# Patient Record
Sex: FEMALE | Race: WHITE | Employment: PART TIME | URBAN - METROPOLITAN AREA
[De-identification: names, ages, dates, MRNs, and addresses within clinical notes are randomized per-mention and may not be internally consistent; named-entity substitution may affect disease eponyms.]

---

## 2017-03-03 ENCOUNTER — HOSPITAL ENCOUNTER (EMERGENCY)
Age: 37
Discharge: HOME | End: 2017-03-03
Attending: FAMILY MEDICINE

## 2017-03-03 VITALS
HEIGHT: 66 IN | OXYGEN SATURATION: 98 % | SYSTOLIC BLOOD PRESSURE: 189 MMHG | DIASTOLIC BLOOD PRESSURE: 107 MMHG | RESPIRATION RATE: 18 BRPM | WEIGHT: 224 LBS | BODY MASS INDEX: 36 KG/M2 | TEMPERATURE: 97.7 F | HEART RATE: 55 BPM

## 2017-03-03 DIAGNOSIS — R05.9 COUGH: Primary | ICD-10-CM

## 2017-03-03 DIAGNOSIS — D17.20 LIPOMA OF AXILLA: ICD-10-CM

## 2017-03-03 RX ORDER — BENZONATATE 100 MG/1
100 CAPSULE ORAL
Qty: 30 CAP | Refills: 0 | Status: SHIPPED | OUTPATIENT
Start: 2017-03-03 | End: 2017-03-10

## 2017-03-03 RX ORDER — LEVOFLOXACIN 500 MG/1
500 TABLET, FILM COATED ORAL DAILY
Qty: 7 TAB | Refills: 0 | Status: SHIPPED | OUTPATIENT
Start: 2017-03-03 | End: 2017-03-29

## 2017-03-03 NOTE — UC PROVIDER NOTE
Patient is a 39 y.o. female presenting with cough and nodule. The history is provided by the patient. Cough   This is a new problem. The current episode started more than 2 days ago. The problem has been gradually worsening. The cough is non-productive. There has been no fever. Associated symptoms include sore throat. Pertinent negatives include no chest pain, no chills, no sweats, no ear congestion, no rhinorrhea, no myalgias, no wheezing and no nausea. She has tried nothing for the symptoms. She is a smoker. Her past medical history does not include bronchitis, pneumonia, asthma or heart failure. Nodule    This is a new (1 month) problem. The problem has not changed since onset. The problem is associated with nothing. There has been no fever. The rash is present on the right arm. The pain is mild. The pain has been fluctuating since onset. Pertinent negatives include no blisters, no itching, no pain and no weeping. She has tried nothing for the symptoms. Past Medical History:   Diagnosis Date    Allergy     Anxiety     Asthma     Congenital heart disease         Past Surgical History:   Procedure Laterality Date    HX DILATION AND CURETTAGE      endometriosis and precervical ca    HX HEENT      tonsillectomy,DNS sx    HX ORTHOPAEDIC      right tibia fx,no sx needed         Family History   Problem Relation Age of Onset    Hypertension Mother         Social History     Social History    Marital status:      Spouse name: N/A    Number of children: N/A    Years of education: N/A     Occupational History    Not on file.      Social History Main Topics    Smoking status: Light Tobacco Smoker     Packs/day: 0.25     Years: 2.00     Types: Cigarettes    Smokeless tobacco: Never Used    Alcohol use 0.0 oz/week     0 Standard drinks or equivalent per week      Comment: socially    Drug use: No    Sexual activity: Not Currently     Birth control/ protection: None      Comment: ,works in [employer]     Other Topics Concern    Not on file     Social History Narrative                ALLERGIES: Codeine; Hydrocodone; Pcn [penicillins]; and Zithromax [azithromycin]    Review of Systems   Constitutional: Negative for chills. HENT: Positive for sore throat. Negative for rhinorrhea. Respiratory: Positive for cough. Negative for wheezing. Cardiovascular: Negative for chest pain. Gastrointestinal: Negative for nausea. Musculoskeletal: Negative for myalgias. Skin: Negative for itching. Vitals:    03/03/17 1328   BP: (!) 189/107   Pulse: (!) 55   Resp: 18   Temp: 97.7 °F (36.5 °C)   SpO2: 98%   Weight: 101.6 kg (224 lb)   Height: 5' 5.5\" (1.664 m)       Physical Exam   Constitutional: She is oriented to person, place, and time. She appears well-developed and well-nourished. HENT:   Right Ear: External ear normal.   Left Ear: External ear normal.   Eyes: EOM are normal.   Neck: Normal range of motion. Neck supple. No JVD present. No tracheal deviation present. No thyromegaly present. Cardiovascular: Normal rate, regular rhythm and normal heart sounds. Pulmonary/Chest: Effort normal and breath sounds normal. No respiratory distress. She has no wheezes. She has no rales. She exhibits no tenderness. Lymphadenopathy:     She has no cervical adenopathy. Neurological: She is alert and oriented to person, place, and time. Skin: Skin is warm and dry. Pea-sized nodule in right axilla   Psychiatric: She has a normal mood and affect. Her behavior is normal. Judgment and thought content normal.   Nursing note and vitals reviewed. MDM     Differential Diagnosis; Clinical Impression; Plan:     CLINICAL IMPRESSION:  Cough  (primary encounter diagnosis)  Lipoma of axilla    Plan:  1. Tessalon  2. Levaquin  3.    Amount and/or Complexity of Data Reviewed:   Tests in the radiology section of CPT®:  Ordered and reviewed  Risk of Significant Complications, Morbidity, and/or Mortality: Presenting problems: Moderate  Diagnostic procedures: Moderate  Management options:   Moderate  Progress:   Patient progress:  Stable      Procedures

## 2017-03-03 NOTE — DISCHARGE INSTRUCTIONS
Cough: Care Instructions  Your Care Instructions  A cough is your body's response to something that bothers your throat or airways. Many things can cause a cough. You might cough because of a cold or the flu, bronchitis, or asthma. Smoking, postnasal drip, allergies, and stomach acid that backs up into your throat also can cause coughs. A cough is a symptom, not a disease. Most coughs stop when the cause, such as a cold, goes away. You can take a few steps at home to cough less and feel better. Follow-up care is a key part of your treatment and safety. Be sure to make and go to all appointments, and call your doctor if you are having problems. It's also a good idea to know your test results and keep a list of the medicines you take. How can you care for yourself at home? · Drink lots of water and other fluids. This helps thin the mucus and soothes a dry or sore throat. Honey or lemon juice in hot water or tea may ease a dry cough. · Take cough medicine as directed by your doctor. · Prop up your head on pillows to help you breathe and ease a dry cough. · Try cough drops to soothe a dry or sore throat. Cough drops don't stop a cough. Medicine-flavored cough drops are no better than candy-flavored drops or hard candy. · Do not smoke. Avoid secondhand smoke. If you need help quitting, talk to your doctor about stop-smoking programs and medicines. These can increase your chances of quitting for good. When should you call for help? Call 911 anytime you think you may need emergency care. For example, call if:  · You have severe trouble breathing. Call your doctor now or seek immediate medical care if:  · You cough up blood. · You have new or worse trouble breathing. · You have a new or higher fever. · You have a new rash.   Watch closely for changes in your health, and be sure to contact your doctor if:  · You cough more deeply or more often, especially if you notice more mucus or a change in the color of your mucus. · You have new symptoms, such as a sore throat, an earache, or sinus pain. · You do not get better as expected. Where can you learn more? Go to http://jaimie-magdy.info/. Enter D279 in the search box to learn more about \"Cough: Care Instructions. \"  Current as of: May 27, 2016  Content Version: 11.1  © 2006-2016 [employer]. Care instructions adapted under license by [employer] (which disclaims liability or warranty for this information). If you have questions about a medical condition or this instruction, always ask your healthcare professional. [place] [address] any warranty or liability for your use of this information. Lipoma: Care Instructions  Your Care Instructions  A lipoma is a growth of fat just below the skin. It may feel soft and rubbery. Lipomas can occur anywhere on the body. But they are most common on the torso, neck, upper thighs, upper arms, and armpits. A lipoma does not turn into cancer. Lipomas usually are not treated, because most of them don't hurt or cause problems. But your doctor may remove a lipoma if it is painful, gets infected, or bothers you. Follow-up care is a key part of your treatment and safety. Be sure to make and go to all appointments, and call your doctor if you are having problems. It's also a good idea to know your test results and keep a list of the medicines you take. How can you care for yourself at home? · Lipomas usually need no care at home. · If your doctor removes a lipoma, follow directions for taking care of the cut (incision). When should you call for help? Call your doctor now or seek immediate medical care if:  · You have signs of infection, such as:  ¨ Increased pain, swelling, warmth, or redness. ¨ Red streaks leading from the lipoma. ¨ Pus draining from the lipoma. ¨ A fever.   Watch closely for changes in your health, and be sure to contact your doctor if:  · You want to discuss having a lipoma removed. Where can you learn more? Go to http://jaimie-magdy.info/. Enter T352 in the search box to learn more about \"Lipoma: Care Instructions. \"  Current as of: July 1, 2016  Content Version: 11.1  © 4628-9557 [employer]. Care instructions adapted under license by [employer] (which disclaims liability or warranty for this information). If you have questions about a medical condition or this instruction, always ask your healthcare professional. [address] any warranty or liability for your use of this information.

## 2017-03-29 ENCOUNTER — HOSPITAL ENCOUNTER (EMERGENCY)
Age: 37
Discharge: HOME | End: 2017-03-29
Attending: FAMILY MEDICINE

## 2017-03-29 ENCOUNTER — APPOINTMENT (OUTPATIENT)
Dept: GENERAL RADIOLOGY | Age: 37
End: 2017-03-29
Attending: NURSE PRACTITIONER

## 2017-03-29 VITALS
TEMPERATURE: 98.6 F | WEIGHT: 226 LBS | HEART RATE: 60 BPM | OXYGEN SATURATION: 97 % | SYSTOLIC BLOOD PRESSURE: 182 MMHG | RESPIRATION RATE: 18 BRPM | HEIGHT: 66 IN | DIASTOLIC BLOOD PRESSURE: 101 MMHG | BODY MASS INDEX: 36.32 KG/M2

## 2017-03-29 DIAGNOSIS — I10 ESSENTIAL HYPERTENSION: ICD-10-CM

## 2017-03-29 DIAGNOSIS — R31.9 HEMATURIA: ICD-10-CM

## 2017-03-29 DIAGNOSIS — M54.9 MID BACK PAIN ON RIGHT SIDE: Primary | ICD-10-CM

## 2017-03-29 LAB
BILIRUB UR QL: ABNORMAL
GLUCOSE UR QL STRIP.AUTO: NEGATIVE MG/DL
KETONES UR-MCNC: NEGATIVE MG/DL
LEUKOCYTE ESTERASE UR QL STRIP: NEGATIVE
NITRITE UR QL: NEGATIVE
PH UR: 7 [PH] (ref 5–8)
PROT UR QL: 30 MG/DL
RBC # UR STRIP: ABNORMAL /UL
SP GR UR: 1.02 (ref 1–1.03)
UROBILINOGEN UR QL: 0.2 EU/DL (ref 0.2–1)

## 2017-03-29 RX ORDER — CLONIDINE HYDROCHLORIDE 0.1 MG/1
0.1 TABLET ORAL
Status: COMPLETED | OUTPATIENT
Start: 2017-03-29 | End: 2017-03-29

## 2017-03-29 RX ORDER — DICLOFENAC POTASSIUM 50 MG/1
50 TABLET, FILM COATED ORAL 3 TIMES DAILY
Qty: 50 TAB | Refills: 0 | Status: SHIPPED | OUTPATIENT
Start: 2017-03-29 | End: 2017-07-26 | Stop reason: ALTCHOICE

## 2017-03-29 RX ORDER — KETOROLAC TROMETHAMINE 30 MG/ML
60 INJECTION, SOLUTION INTRAMUSCULAR; INTRAVENOUS
Status: COMPLETED | OUTPATIENT
Start: 2017-03-29 | End: 2017-03-29

## 2017-03-29 RX ORDER — METHOCARBAMOL 500 MG/1
500 TABLET, FILM COATED ORAL 3 TIMES DAILY
Qty: 20 TAB | Refills: 0 | Status: SHIPPED | OUTPATIENT
Start: 2017-03-29 | End: 2017-07-26 | Stop reason: ALTCHOICE

## 2017-03-29 RX ADMIN — KETOROLAC TROMETHAMINE 60 MG: 30 INJECTION, SOLUTION INTRAMUSCULAR; INTRAVENOUS at 12:41

## 2017-03-29 RX ADMIN — CLONIDINE HYDROCHLORIDE 0.1 MG: 0.1 TABLET ORAL at 13:09

## 2017-03-29 NOTE — DISCHARGE INSTRUCTIONS
High Blood Pressure: Care Instructions  Your Care Instructions  If your blood pressure is usually above 140/90, you have high blood pressure, or hypertension. That means the top number is 140 or higher or the bottom number is 90 or higher, or both. Despite what a lot of people think, high blood pressure usually doesn't cause headaches or make you feel dizzy or lightheaded. It usually has no symptoms. But it does increase your risk for heart attack, stroke, and kidney or eye damage. The higher your blood pressure, the more your risk increases. Your doctor will give you a goal for your blood pressure. Your goal will be based on your health and your age. An example of a goal is to keep your blood pressure below 140/90. Lifestyle changes, such as eating healthy and being active, are always important to help lower blood pressure. You might also take medicine to reach your blood pressure goal.  Follow-up care is a key part of your treatment and safety. Be sure to make and go to all appointments, and call your doctor if you are having problems. It's also a good idea to know your test results and keep a list of the medicines you take. How can you care for yourself at home? Medical treatment  · If you stop taking your medicine, your blood pressure will go back up. You may take one or more types of medicine to lower your blood pressure. Be safe with medicines. Take your medicine exactly as prescribed. Call your doctor if you think you are having a problem with your medicine. · Talk to your doctor before you start taking aspirin every day. Aspirin can help certain people lower their risk of a heart attack or stroke. But taking aspirin isn't right for everyone, because it can cause serious bleeding. · See your doctor regularly. You may need to see the doctor more often at first or until your blood pressure comes down.   · If you are taking blood pressure medicine, talk to your doctor before you take decongestants or anti-inflammatory medicine, such as ibuprofen. Some of these medicines can raise blood pressure. · Learn how to check your blood pressure at home. Lifestyle changes  · Stay at a healthy weight. This is especially important if you put on weight around the waist. Losing even 10 pounds can help you lower your blood pressure. · If your doctor recommends it, get more exercise. Walking is a good choice. Bit by bit, increase the amount you walk every day. Try for at least 30 minutes on most days of the week. You also may want to swim, bike, or do other activities. · Avoid or limit alcohol. Talk to your doctor about whether you can drink any alcohol. · Try to limit how much sodium you eat to less than 2,300 milligrams (mg) a day. Your doctor may ask you to try to eat less than 1,500 mg a day. · Eat plenty of fruits (such as bananas and oranges), vegetables, legumes, whole grains, and low-fat dairy products. · Lower the amount of saturated fat in your diet. Saturated fat is found in animal products such as milk, cheese, and meat. Limiting these foods may help you lose weight and also lower your risk for heart disease. · Do not smoke. Smoking increases your risk for heart attack and stroke. If you need help quitting, talk to your doctor about stop-smoking programs and medicines. These can increase your chances of quitting for good. When should you call for help? Call 911 anytime you think you may need emergency care. This may mean having symptoms that suggest that your blood pressure is causing a serious heart or blood vessel problem. Your blood pressure may be over 180/110. For example, call 911 if:  · You have symptoms of a heart attack. These may include:  ¨ Chest pain or pressure, or a strange feeling in the chest.  ¨ Sweating. ¨ Shortness of breath. ¨ Nausea or vomiting. ¨ Pain, pressure, or a strange feeling in the back, neck, jaw, or upper belly or in one or both shoulders or arms.   ¨ Lightheadedness or sudden weakness. ¨ A fast or irregular heartbeat. · You have symptoms of a stroke. These may include:  ¨ Sudden numbness, tingling, weakness, or loss of movement in your face, arm, or leg, especially on only one side of your body. ¨ Sudden vision changes. ¨ Sudden trouble speaking. ¨ Sudden confusion or trouble understanding simple statements. ¨ Sudden problems with walking or balance. ¨ A sudden, severe headache that is different from past headaches. · You have severe back or belly pain. Do not wait until your blood pressure comes down on its own. Get help right away. Call your doctor now or seek immediate care if:  · Your blood pressure is much higher than normal (such as 180/110 or higher), but you don't have symptoms. · You think high blood pressure is causing symptoms, such as:  ¨ Severe headache. ¨ Blurry vision. Watch closely for changes in your health, and be sure to contact your doctor if:  · Your blood pressure measures 140/90 or higher at least 2 times. That means the top number is 140 or higher or the bottom number is 90 or higher, or both. · You think you may be having side effects from your blood pressure medicine. · Your blood pressure is usually normal, but it goes above normal at least 2 times. Where can you learn more? Go to http://jaimie-magdy.info/. Enter B564 in the search box to learn more about \"High Blood Pressure: Care Instructions. \"  Current as of: August 8, 2016  Content Version: 11.2  © 4414-4666 [employer]. Care instructions adapted under license by [email or web address] (which disclaims liability or warranty for this information). If you have questions about a medical condition or this instruction, always ask your healthcare professional. [place] [address] any warranty or liability for your use of this information.          Flank Pain: Care Instructions  Your Care Instructions  Flank pain is pain on the side of the back just below the rib cage and above the waist. It can be on one or both sides. Flank pain has many possible causes, including a kidney stone, a urinary tract infection, or back strain. Flank pain may get better on its own. But don't ignore new symptoms, such as fever, nausea and vomiting, urination problems, pain that gets worse, and dizziness. These may be signs of a more serious problem. You may have to have tests or other treatment. Follow-up care is a key part of your treatment and safety. Be sure to make and go to all appointments, and call your doctor if you are having problems. It's also a good idea to know your test results and keep a list of the medicines you take. How can you care for yourself at home? · Rest until you feel better. · Take pain medicines exactly as directed. ¨ If the doctor gave you a prescription medicine for pain, take it as prescribed. ¨ If you are not taking a prescription pain medicine, ask your doctor if you can take an over-the-counter pain medicine, such as acetaminophen (Tylenol), ibuprofen (Advil, Motrin), or naproxen (Aleve). Read and follow all instructions on the label. · If your doctor prescribed antibiotics, take them as directed. Do not stop taking them just because you feel better. You need to take the full course of antibiotics. · To apply heat, put a warm water bottle, a heating pad set on low, or a warm cloth on the painful area. Do not go to sleep with a heating pad on your skin. · To prevent dehydration, drink plenty of fluids, enough so that your urine is light yellow or clear like water. Choose water and other caffeine-free clear liquids until you feel better. If you have kidney, heart, or liver disease and have to limit fluids, talk with your doctor before you increase the amount of fluids you drink. When should you call for help? Call your doctor now or seek immediate medical care if:  · Your flank pain gets worse.   · You have new symptoms, such as fever, nausea, or vomiting. · You have symptoms of a urinary problem. For example:  ¨ You have blood or pus in your urine. ¨ You have chills or body aches. ¨ It hurts to urinate. ¨ You have groin or belly pain. Watch closely for changes in your health, and be sure to contact your doctor if you do not get better as expected. Where can you learn more? Go to http://jaimie-magdy.info/. Enter S191 in the search box to learn more about \"Flank Pain: Care Instructions. \"  Current as of: May 27, 2016  Content Version: 11.2  © 1610-8512 [employer]. Care instructions adapted under license by [employer] (which disclaims liability or warranty for this information). If you have questions about a medical condition or this instruction, always ask your healthcare professional. [place] [address] any warranty or liability for your use of this information. Back Pain: Care Instructions  Your Care Instructions    Back pain has many possible causes. It is often related to problems with muscles and ligaments of the back. It may also be related to problems with the nerves, discs, or bones of the back. Moving, lifting, standing, sitting, or sleeping in an awkward way can strain the back. Sometimes you don't notice the injury until later. Arthritis is another common cause of back pain. Although it may hurt a lot, back pain usually improves on its own within several weeks. Most people recover in 12 weeks or less. Using good home treatment and being careful not to stress your back can help you feel better sooner. Follow-up care is a key part of your treatment and safety. Be sure to make and go to all appointments, and call your doctor if you are having problems. Its also a good idea to know your test results and keep a list of the medicines you take. How can you care for yourself at home? · Sit or lie in positions that are most comfortable and reduce your pain.  Try one of these positions when you lie down:  ¨ Lie on your back with your knees bent and supported by large pillows. ¨ Lie on the floor with your legs on the seat of a sofa or chair. [place] on your side with your knees and hips bent and a pillow between your legs. ¨ Lie on your stomach if it does not make pain worse. · Do not sit up in bed, and avoid soft couches and twisted positions. Bed rest can help relieve pain at first, but it delays healing. Avoid bed rest after the first day of back pain. · Change positions every 30 minutes. If you must sit for long periods of time, take breaks from sitting. Get up and walk around, or lie in a comfortable position. · Try using a heating pad on a low or medium setting for 15 to 20 minutes every 2 or 3 hours. Try a warm shower in place of one session with the heating pad. · You can also try an ice pack for 10 to 15 minutes every 2 to 3 hours. Put a thin cloth between the ice pack and your skin. · Take pain medicines exactly as directed. ¨ If the doctor gave you a prescription medicine for pain, take it as prescribed. ¨ If you are not taking a prescription pain medicine, ask your doctor if you can take an over-the-counter medicine. · Take short walks several times a day. You can start with 5 to 10 minutes, 3 or 4 times a day, and work up to longer walks. Walk on level surfaces and avoid hills and stairs until your back is better. · Return to work and other activities as soon as you can. Continued rest without activity is usually not good for your back. · To prevent future back pain, do exercises to stretch and strengthen your back and stomach. Learn how to use good posture, safe lifting techniques, and proper body mechanics. When should you call for help? Call your doctor now or seek immediate medical care if:  · You have new or worsening numbness in your legs. · You have new or worsening weakness in your legs.  (This could make it hard to stand up.)  · You lose control of your bladder or bowels. Watch closely for changes in your health, and be sure to contact your doctor if:  · Your pain gets worse. · You are not getting better after 2 weeks. Where can you learn more? Go to http://jaimie-magdy.info/. Enter W875 in the search box to learn more about \"Back Pain: Care Instructions. \"  Current as of: May 23, 2016  Content Version: 11.2  © 3670-9371 [employer]. Care instructions adapted under license by [employer] (which disclaims liability or warranty for this information). If you have questions about a medical condition or this instruction, always ask your healthcare professional. [address] any warranty or liability for your use of this information.

## 2017-03-29 NOTE — UC PROVIDER NOTE
Patient is a 39 y.o. female presenting with back pain. The history is provided by the patient. No  was used. Back Pain    This is a new problem. Episode onset: 4 days. The problem has been gradually worsening. The problem occurs constantly. Patient reports not work related injury. The pain is associated with no known injury. Pain location: right flank. The pain does not radiate. The pain is at a severity of 7/10. The pain is moderate. The symptoms are aggravated by bending. The pain is the same all the time. Pertinent negatives include no abdominal pain, no abdominal swelling, no bowel incontinence, no perianal numbness, no bladder incontinence and no dysuria. She has tried nothing for the symptoms. The treatment provided no relief. Risk factors include obesity and history of kidney stones. Past Medical History:   Diagnosis Date    Allergy     Anxiety     Asthma     Congenital heart disease         Past Surgical History:   Procedure Laterality Date    HX DILATION AND CURETTAGE      endometriosis and precervical ca    HX HEENT      tonsillectomy,DNS sx    HX ORTHOPAEDIC      right tibia fx,no sx needed         Family History   Problem Relation Age of Onset    Hypertension Mother         Social History     Social History    Marital status:      Spouse name: N/A    Number of children: N/A    Years of education: N/A     Occupational History    Not on file. Social History Main Topics    Smoking status: Light Tobacco Smoker     Packs/day: 0.25     Years: 2.00     Types: Cigarettes    Smokeless tobacco: Never Used    Alcohol use 0.0 oz/week     0 Standard drinks or equivalent per week      Comment: socially    Drug use: No    Sexual activity: Not Currently     Birth control/ protection: None      Comment: ,works in [employer]     Other Topics Concern    Not on file     Social History Narrative                ALLERGIES: Codeine;  Hydrocodone; Pcn [penicillins]; and Zithromax [azithromycin]    Review of Systems   Constitutional: Negative. HENT: Negative. Eyes: Negative. Respiratory: Negative. Cardiovascular: Negative. Gastrointestinal: Negative. Negative for abdominal pain and bowel incontinence. Genitourinary: Positive for flank pain. Negative for bladder incontinence, dysuria, frequency and urgency. Musculoskeletal: Positive for back pain. Skin: Negative. Neurological: Negative. Hematological: Negative. Psychiatric/Behavioral: Negative. Vitals:    03/29/17 1158   BP: (!) 185/102   Pulse: 60   Resp: 18   Temp: 98.6 °F (37 °C)   SpO2: 97%   Weight: 102.5 kg (226 lb)   Height: 5' 5.5\" (1.664 m)       Physical Exam   Constitutional: She is oriented to person, place, and time. She appears well-developed and well-nourished. HENT:   Head: Normocephalic and atraumatic. Right Ear: External ear normal.   Left Ear: External ear normal.   Nose: Nose normal.   Mouth/Throat: Oropharynx is clear and moist.   Eyes: Conjunctivae and EOM are normal. Pupils are equal, round, and reactive to light. Neck: Normal range of motion. Neck supple. Cardiovascular: Normal rate, regular rhythm and normal heart sounds. Pulmonary/Chest: Effort normal and breath sounds normal.   Abdominal: Soft. Bowel sounds are normal. There is tenderness. Mid right abdomen   Musculoskeletal: Normal range of motion. Neurological: She is alert and oriented to person, place, and time. Skin: Skin is warm and dry. Psychiatric: She has a normal mood and affect. Her behavior is normal. Judgment and thought content normal.   Nursing note and vitals reviewed. MDM     Differential Diagnosis; Clinical Impression; Plan:     CLINICAL IMPRESSION:  Mid back pain on right side  (primary encounter diagnosis)  Hematuria  Essential hypertension    Plan:  1. Mid back pain with hematuria. Does not look like a UTI.  If you continue to have mid back pain please go to the ER for further evaluation. 2. Please follow up with your PCP regarding elevated BP. 3.   Amount and/or Complexity of Data Reviewed:   Clinical lab tests:  Ordered and reviewed  Tests in the radiology section of CPT®:  Ordered and reviewed  Risk of Significant Complications, Morbidity, and/or Mortality:   Presenting problems: Moderate  Diagnostic procedures:   Moderate  Progress:   Patient progress:  Stable      Procedures

## 2017-03-31 ENCOUNTER — TELEPHONE (OUTPATIENT)
Dept: INTERNAL MEDICINE CLINIC | Age: 37
End: 2017-03-31

## 2017-03-31 NOTE — TELEPHONE ENCOUNTER
Patient called stated she went to urgent care 2 days ago for pain in her right kidney she still is having pain want to know should she make an appt. to see Hannah Brenner she can be reached at [phone]

## 2017-04-03 ENCOUNTER — OFFICE VISIT (OUTPATIENT)
Dept: INTERNAL MEDICINE CLINIC | Age: 37
End: 2017-04-03

## 2017-04-03 VITALS
HEART RATE: 67 BPM | WEIGHT: 222 LBS | OXYGEN SATURATION: 97 % | DIASTOLIC BLOOD PRESSURE: 98 MMHG | TEMPERATURE: 97.6 F | BODY MASS INDEX: 35.68 KG/M2 | SYSTOLIC BLOOD PRESSURE: 144 MMHG | RESPIRATION RATE: 20 BRPM | HEIGHT: 66 IN

## 2017-04-03 DIAGNOSIS — Z00.00 ROUTINE GENERAL MEDICAL EXAMINATION AT A HEALTH CARE FACILITY: ICD-10-CM

## 2017-04-03 DIAGNOSIS — J45.20 MILD INTERMITTENT ASTHMA WITHOUT COMPLICATION: ICD-10-CM

## 2017-04-03 DIAGNOSIS — I47.1 SVT (SUPRAVENTRICULAR TACHYCARDIA) (HCC): ICD-10-CM

## 2017-04-03 DIAGNOSIS — E55.9 VITAMIN D DEFICIENCY: ICD-10-CM

## 2017-04-03 DIAGNOSIS — M62.830 BACK SPASM: ICD-10-CM

## 2017-04-03 DIAGNOSIS — K52.9 FREQUENT STOOLS: ICD-10-CM

## 2017-04-03 DIAGNOSIS — E66.9 OBESITY (BMI 30-39.9): ICD-10-CM

## 2017-04-03 DIAGNOSIS — M62.838 MUSCLE SPASM: ICD-10-CM

## 2017-04-03 DIAGNOSIS — I10 ESSENTIAL HYPERTENSION: Primary | ICD-10-CM

## 2017-04-03 RX ORDER — ALBUTEROL SULFATE 90 UG/1
1 AEROSOL, METERED RESPIRATORY (INHALATION)
Qty: 1 INHALER | Refills: 3 | Status: SHIPPED | OUTPATIENT
Start: 2017-04-03 | End: 2022-05-16 | Stop reason: SDUPTHER

## 2017-04-03 NOTE — MR AVS SNAPSHOT
Visit Information Date & Time Provider Department Dept. Phone Encounter #  
 4/3/2017  9:30 AM Marcy Ko MD [facility] [id number] Your Appointments 4/18/2017  2:00 PM  
ESTABLISHED PATIENT with Eve Gallagher MD  
[facility] ([address]) Appt Note: 6 month follow up  
 [address]  
[address]  
  
    
 7/26/2017  3:00 PM  
ESTABLISHED PATIENT with Eve Gallagher MD  
[facility] ([address]) Appt Note: one year follow up  
 [address] [facility] [address]  
[phone] Upcoming Health Maintenance Date Due Pneumococcal 19-64 Medium Risk (1 of 1 - PPSV23) 8/5/1999 DTaP/Tdap/Td series (1 - Tdap) 8/5/2001 PAP AKA CERVICAL CYTOLOGY 8/5/2001 INFLUENZA AGE 9 TO ADULT 8/1/2016 Allergies as of 4/3/2017  Review Complete On: 4/3/2017 By: Marcy Ko MD  
  
 Severity Noted Reaction Type Reactions Codeine  03/15/2011    Unknown (comments) Hydrocodone  10/03/2014    Other (comments) Pcn [Penicillins]  03/15/2011    Unknown (comments) Zithromax [Azithromycin]  03/15/2011    Rash Current Immunizations  Never Reviewed No immunizations on file. Not reviewed this visit You Were Diagnosed With   
  
 Codes Comments Essential hypertension    -  Primary ICD-10-CM: I10 
ICD-9-CM: 401.9 Obesity (BMI 30-39. 9)     ICD-10-CM: E66.9 ICD-9-CM: 278.00 Muscle spasm     ICD-10-CM: D02.555 ICD-9-CM: 728.85 Frequent stools     ICD-10-CM: K52.9 ICD-9-CM: 787.91 Vitamin D deficiency     ICD-10-CM: E55.9 ICD-9-CM: 268.9 Routine general medical examination at a health care facility     ICD-10-CM: Z00.00 ICD-9-CM: V70.0 SVT (supraventricular tachycardia) (HCC)     ICD-10-CM: I47.1 ICD-9-CM: 427.89   
 Mild intermittent asthma without complication     ZACHARIAH-91-LA: J45.20 ICD-9-CM: 493.90 Back spasm     ICD-10-CM: E73.066 ICD-9-CM: 724.8 Vitals BP Pulse Temp Resp Height(growth percentile) Weight(growth percentile) (!) 144/98 (BP 1 Location: Left arm, BP Patient Position: Sitting) 67 97.6 °F (36.4 °C) (Oral) 20 5' 5.5\" (1.664 m) 222 lb (100.7 kg) LMP SpO2 BMI OB Status Smoking Status 03/30/2017 97% 36.38 kg/m2 Having regular periods Light Tobacco Smoker Vitals History BMI and BSA Data Body Mass Index Body Surface Area  
 36.38 kg/m 2 2.16 m 2 Preferred Pharmacy Pharmacy Name Phone [facility] [address] [place] [address] [phone] Your Updated Medication List  
  
   
This list is accurate as of: 4/3/17 10:09 AM.  Always use your most recent med list.  
  
  
  
  
 albuterol 90 mcg/actuation inhaler Commonly known as:  PROVENTIL HFA, VENTOLIN HFA, PROAIR HFA Take 1 Puff by inhalation every four (4) hours as needed for Wheezing. diclofenac potassium 50 mg tablet Commonly known as:  CATAFLAM  
Take 1 Tab by mouth three (3) times daily. Indications: Pain  
  
 methocarbamol 500 mg tablet Commonly known as:  ROBAXIN Take 1 Tab by mouth three (3) times daily. metoprolol tartrate 50 mg tablet Commonly known as:  LOPRESSOR Take 1 Tab by mouth two (2) times a day. Omega-3-DHA-EPA-Fish Oil 1,000 mg (120 mg-180 mg) Cap Take 1,000 mg by mouth daily. ONE-A-DAY WOMENS FORMULA PO Take  by mouth. Prescriptions Sent to Pharmacy Refills  
 albuterol (PROVENTIL HFA, VENTOLIN HFA, PROAIR HFA) 90 mcg/actuation inhaler 3 Sig: Take 1 Puff by inhalation every four (4) hours as needed for Wheezing. Class: Normal  
 Pharmacy: [address] #: [phone] Route: Inhalation We Performed the Following CBC WITH AUTOMATED DIFF [36269 CPT(R)] LIPID PANEL [57340 CPT(R)] METABOLIC PANEL, COMPREHENSIVE [69576 CPT(R)] TSH 3RD GENERATION [05941 CPT(R)] URINALYSIS W/ RFLX MICROSCOPIC [32503 CPT(R)] VITAMIN D, 25 HYDROXY [id number] CPT(R)] Patient Instructions Back Stretches: Exercises Your Care Instructions Here are some examples of exercises for stretching your back. Start each exercise slowly. Ease off the exercise if you start to have pain. Your doctor or physical therapist will tell you when you can start these exercises and which ones will work best for you. How to do the exercises Overhead stretch 1. Stand comfortably with your feet shoulder-width apart. 2. Looking straight ahead, raise both arms over your head and reach toward the ceiling. Do not allow your head to tilt back. 3. Hold for 15 to 30 seconds, then lower your arms to your sides. 4. Repeat 2 to 4 times. Side stretch 1. Stand comfortably with your feet shoulder-width apart. 2. Raise one arm over your head, and then lean to the other side. 3. Slide your hand down your leg as you let the weight of your arm gently stretch your side muscles. Hold for 15 to 30 seconds. 4. Repeat 2 to 4 times on each side. Press-up 1. Lie on your stomach, supporting your body with your forearms. 2. Press your elbows down into the floor to raise your upper back. As you do this, relax your stomach muscles and allow your back to arch without using your back muscles. As your press up, do not let your hips or pelvis come off the floor. 3. Hold for 15 to 30 seconds, then relax. 4. Repeat 2 to 4 times. Relax and rest 
 
1. Lie on your back with a rolled towel under your neck and a pillow under your knees. Extend your arms comfortably to your sides. 2. Relax and breathe normally. 3. Remain in this position for about 10 minutes. 4. If you can, do this 2 or 3 times each day. Follow-up care is a key part of your treatment and safety. Be sure to make and go to all appointments, and call your doctor if you are having problems. It's also a good idea to know your test results and keep a list of the medicines you take. Where can you learn more? Go to http://jaimie-magdy.info/. Enter K790 in the search box to learn more about \"Back Stretches: Exercises. \" Current as of: May 23, 2016 Content Version: 11.2 © 0895-1617 [employer]. Care instructions adapted under license by [employer] (which disclaims liability or warranty for this information). If you have questions about a medical condition or this instruction, always ask your healthcare professional. [address] any warranty or liability for your use of this information. Introducing [facility]! Floridalma Alcantar introduces [employer] patient portal. Now you can access parts of your medical record, email your doctor's office, and request medication refills online. 1. In your internet browser, go to [email or web address] 2. Click on the First Time User? Click Here link in the Sign In box. You will see the New Member Sign Up page. 3. Enter your [employer] Access Code exactly as it appears below. You will not need to use this code after youve completed the sign-up process. If you do not sign up before the expiration date, you must request a new code. · [employer] Access Code: [email or web address] Expires: 6/27/2017 11:24 AM 
 
4. Enter the last four digits of your Social Security Number (xxxx) and Date of Birth (mm/dd/yyyy) as indicated and click Submit. You will be taken to the next sign-up page. 5. Create a OFERTALDIAt ID. This will be your [employer] login ID and cannot be changed, so think of one that is secure and easy to remember. 6. Create a OFERTALDIAt password. You can change your password at any time. 7. Enter your Password Reset Question and Answer. This can be used at a later time if you forget your password. 8. Enter your e-mail address. You will receive e-mail notification when new information is available in [address] 9. Click Sign Up. You can now view and download portions of your medical record. 10. Click the Download Summary menu link to download a portable copy of your medical information. If you have questions, please visit the Frequently Asked Questions section of the [employer] website. Remember, [employer] is NOT to be used for urgent needs. For medical emergencies, dial 911. Now available from your iPhone and Android! Please provide this summary of care documentation to your next provider. Your primary care clinician is listed as Vasu Tse. If you have any questions after today's visit, please call [phone].

## 2017-04-03 NOTE — LETTER
4/10/2017 3:12 PM 
 
Ms. Baudilio VÁZQUEZ [id number]. [phone] Dear Baudilio Hall: 
 
Please find your most recent results below. Resulted Orders CBC WITH AUTOMATED DIFF Result Value Ref Range WBC 6.9 3.4 - 10.8 x10E3/uL  
 RBC 4.87 3.77 - 5.28 x10E6/uL HGB 15.1 11.1 - 15.9 g/dL HCT 46.2 34.0 - 46.6 % MCV 95 79 - 97 fL  
 MCH 31.0 26.6 - 33.0 pg  
 MCHC 32.7 31.5 - 35.7 g/dL  
 RDW 13.8 12.3 - 15.4 % PLATELET 418 451 - 761 x10E3/uL NEUTROPHILS 59 % Lymphocytes 25 % MONOCYTES 10 % EOSINOPHILS 5 % BASOPHILS 1 %  
 ABS. NEUTROPHILS 4.1 1.4 - 7.0 x10E3/uL Abs Lymphocytes 1.8 0.7 - 3.1 x10E3/uL  
 ABS. MONOCYTES 0.7 0.1 - 0.9 x10E3/uL  
 ABS. EOSINOPHILS 0.3 0.0 - 0.4 x10E3/uL  
 ABS. BASOPHILS 0.1 0.0 - 0.2 x10E3/uL IMMATURE GRANULOCYTES 0 %  
 ABS. IMM. GRANS. 0.0 0.0 - 0.1 x10E3/uL Narrative Performed at:  [address]  [id number] : Mary Escobar MD, Phone:  [phone] METABOLIC PANEL, COMPREHENSIVE Result Value Ref Range Glucose 83 65 - 99 mg/dL BUN 10 6 - 20 mg/dL Creatinine 0.63 0.57 - 1.00 mg/dL GFR est non- >59 mL/min/1.73 GFR est  >59 mL/min/1.73  
 BUN/Creatinine ratio 16 9 - 23 Comment: **Please note reference interval change** Sodium 141 134 - 144 mmol/L Potassium 4.3 3.5 - 5.2 mmol/L Chloride 102 96 - 106 mmol/L  
 CO2 27 18 - 29 mmol/L Calcium 9.2 8.7 - 10.2 mg/dL Protein, total 6.7 6.0 - 8.5 g/dL Albumin 3.9 3.5 - 5.5 g/dL GLOBULIN, TOTAL 2.8 1.5 - 4.5 g/dL A-G Ratio 1.4 1.2 - 2.2 Comment: **Please note reference interval change** Bilirubin, total 0.5 0.0 - 1.2 mg/dL Alk. phosphatase 152 (H) 39 - 117 IU/L  
 AST (SGOT) 45 (H) 0 - 40 IU/L  
 ALT (SGPT) 26 0 - 32 IU/L Narrative Performed at:  [address]  [id number] : Eliane Villar MD, Phone:  [phone] LIPID PANEL Result Value Ref Range Cholesterol, total 193 100 - 199 mg/dL Triglyceride 102 0 - 149 mg/dL HDL Cholesterol 61 >39 mg/dL VLDL, calculated 20 5 - 40 mg/dL LDL, calculated 112 (H) 0 - 99 mg/dL Narrative Performed at:  [address]  [id number] : Eliane Villar MD, Phone:  [phone] TSH 3RD GENERATION Result Value Ref Range TSH 2.040 0.450 - 4.500 uIU/mL Narrative Performed at:  [address]  [id number] : Eliane Villar MD, Phone:  [phone] URINALYSIS W/ RFLX MICROSCOPIC Result Value Ref Range Specific Gravity 1.020 1.005 - 1.030  
 pH (UA) 6.0 5.0 - 7.5 Color Yellow Yellow Appearance Clear Clear Leukocyte Esterase Negative Negative Protein Negative Negative/Trace Glucose Negative Negative Ketone Negative Negative Blood Negative Negative Bilirubin Negative Negative Urobilinogen 0.2 0.2 - 1.0 mg/dL Nitrites Negative Negative Microscopic Examination Comment Comment:  
   Microscopic not indicated and not performed. Narrative Performed at:  [address]  [id number] : Eliane Villar MD, Phone:  [phone] VITAMIN D, 25 HYDROXY Result Value Ref Range VITAMIN D, 25-HYDROXY 11.5 (L) 30.0 - 100.0 ng/mL Comment:  
   Vitamin D deficiency has been defined by the [facility] practice guideline as a 
level of serum 25-OH vitamin D less than 20 ng/mL (1,2). The Endocrine Society went on to further define vitamin D 
insufficiency as a level between 21 and 29 ng/mL (2). 1. IOM ([place] of Medicine). 2010. Dietary reference 
   intakes for calcium and D. [facility]: [employer] 
   [employer]. 2. Massiel MF, Nirav IRENE, Tomas YOST, et al. 
   Evaluation, treatment, and prevention of vitamin D 
   deficiency: an Endocrine Society clinical practice 
   guideline. JCEM. 2011 Jul; 96(7):1911-30. Narrative Performed at:  [address]  [id number] : Mere Madera MD, Phone:  [phone] CVD REPORT Result Value Ref Range INTERPRETATION Note Comment:  
   Supplement report is available. Narrative Performed at:  [address] [place] [address] [address] [place] [address]  [id number] : Diamond Zambrano PhD, Phone:  [phone] RECOMMENDATIONS: 
Jazmin Servin your labs indicate that  your vitamin  D level very low, will start on vit D 50,000 unit 1 cap weekly for 4 months. will repeat level in 4 months and increase your consumption of  milk product and expose to sun for 20 min a day. Also, a couple of your liver enzymes are elevated, please avoid alcohol and tylenol and have liver enzymes redrawn in 1 month. I have enclosed that labs slip for your convenience Please call me if you have any questions: [phone] Sincerely, Tangela Grier MD

## 2017-04-03 NOTE — PATIENT INSTRUCTIONS
Back Stretches: Exercises  Your Care Instructions  Here are some examples of exercises for stretching your back. Start each exercise slowly. Ease off the exercise if you start to have pain. Your doctor or physical therapist will tell you when you can start these exercises and which ones will work best for you. How to do the exercises  Overhead stretch    1. Stand comfortably with your feet shoulder-width apart. 2. Looking straight ahead, raise both arms over your head and reach toward the ceiling. Do not allow your head to tilt back. 3. Hold for 15 to 30 seconds, then lower your arms to your sides. 4. Repeat 2 to 4 times. Side stretch    1. Stand comfortably with your feet shoulder-width apart. 2. Raise one arm over your head, and then lean to the other side. 3. Slide your hand down your leg as you let the weight of your arm gently stretch your side muscles. Hold for 15 to 30 seconds. 4. Repeat 2 to 4 times on each side. Press-up    1. Lie on your stomach, supporting your body with your forearms. 2. Press your elbows down into the floor to raise your upper back. As you do this, relax your stomach muscles and allow your back to arch without using your back muscles. As your press up, do not let your hips or pelvis come off the floor. 3. Hold for 15 to 30 seconds, then relax. 4. Repeat 2 to 4 times. Relax and rest    1. Lie on your back with a rolled towel under your neck and a pillow under your knees. Extend your arms comfortably to your sides. 2. Relax and breathe normally. 3. Remain in this position for about 10 minutes. 4. If you can, do this 2 or 3 times each day. Follow-up care is a key part of your treatment and safety. Be sure to make and go to all appointments, and call your doctor if you are having problems. It's also a good idea to know your test results and keep a list of the medicines you take. Where can you learn more? Go to http://jaimie-magdy.info/.   Enter A666 in the search box to learn more about \"Back Stretches: Exercises. \"  Current as of: May 23, 2016  Content Version: 11.2  © 5209-6877 [employer]. Care instructions adapted under license by [employer] (which disclaims liability or warranty for this information). If you have questions about a medical condition or this instruction, always ask your healthcare professional. [place] [address] any warranty or liability for your use of this information.

## 2017-04-03 NOTE — PROGRESS NOTES
Health Maintenance Due   Topic Date Due    Pneumococcal 19-64 Medium Risk (1 of 1 - PPSV23) 08/05/1999    DTaP/Tdap/Td series (1 - Tdap) 08/05/2001    PAP AKA CERVICAL CYTOLOGY  08/05/2001    INFLUENZA AGE 9 TO ADULT  08/01/2016       Chief Complaint   Patient presents with    Follow-up     seen at [facility] 3/29/17 for flank pain and still continues, states better but radiating now     Diarrhea     pt states she uses the bathroom several times a day and always after meals x 2 years       1. Have you been to the ER, urgent care clinic since your last visit? Hospitalized since your last visit? Yes When: 3/29/2017 Where: [facility] Reason for visit: flank pain    2. Have you seen or consulted any other health care providers outside of the [address] [place] [address] since your last visit? Include any pap smears or colon screening. No    3) Do you have an Advance Directive on file? no    4) Are you interested in receiving information on Advance Directives? NO      Patient is accompanied by self I have received verbal consent from Junie Ignacio to discuss any/all medical information while they are present in the room.

## 2017-04-03 NOTE — PROGRESS NOTES
HISTORY OF PRESENT ILLNESS  Keila Lewis is a 39 y.o. female here for follow up.had acute flank pain. went to [address] [place] [address] was checked,was neg.robaxin was given. Now pain is better. She worried about kidney stone. Noticed to have mildly elevated BP.already on metoprolol for SVT. Had congenital heart surgery,seen by cardiologistwali. Reports frequent loose stool for 2 years. She thinks she has IBS. Has asthma,stable. need refill. HPI    Review of Systems   Constitutional: Negative. HENT: Negative. Eyes: Negative. Respiratory: Negative. Cardiovascular: Negative. Gastrointestinal: Negative. Genitourinary: Positive for flank pain. Skin: Negative. Psychiatric/Behavioral: Negative. Physical Exam   Constitutional: She is oriented to person, place, and time. She appears well-developed and well-nourished. No distress. HENT:   Head: Normocephalic and atraumatic. Right Ear: External ear normal.   Left Ear: External ear normal.   Nose: Nose normal.   Mouth/Throat: No oropharyngeal exudate. Neck: Normal range of motion. Neck supple. No JVD present. No thyromegaly present. Cardiovascular: Normal rate, regular rhythm, normal heart sounds and intact distal pulses. Pulmonary/Chest: Effort normal and breath sounds normal. No respiratory distress. She has no wheezes. Musculoskeletal: She exhibits no edema or tenderness. Back:NT ROm OK   Lymphadenopathy:     She has no cervical adenopathy. Neurological: She is alert and oriented to person, place, and time. She has normal reflexes. She displays normal reflexes. No cranial nerve deficit. She exhibits abnormal muscle tone. Coordination normal.   Psychiatric: She has a normal mood and affect. Her behavior is normal.       ASSESSMENT and PLAN  Keila was seen today for follow-up and diarrhea.     Diagnoses and all orders for this visit:    Essential hypertension      My Recommendations  -Purchase a blood pressure cuff that goes around your upper arm and check blood pressure at least 3 times per week. Write down your numbers for review. Check blood pressure in the morning and evening. Rest for 5 minutes with feet elevated and arm resting on a table (at mid-chest level) when checking blood pressure  -Exercise 30-60 minutes most days of the week, preferably with a mix of cardiovascular and strength training. Exercise can improve blood pressure, even if you do not lose weight!  -Limit alcohol intake to less than 2-3 drinks daily   -Avoid tobacco products  -Avoid caffeine, energy drinks, stimulants  -DASH diet - includes fruits, vegetables, fiber, and less than 2000 mg sodium (salt) daily. Try to eat less than 8354-3263 calories daily. Limit fat intake.    -Avoid non-steroidal inflammatory medications (NSAIDS) such as ibuprofen, advil, motrin, aleve, and naproxyn as these may increase blood pressure if used long-term  -Avoid OTC decongestants such as pseudopherine, phenylephrine, Afrin    On metoprolol BID.  -     CBC WITH AUTOMATED DIFF  -     METABOLIC PANEL, COMPREHENSIVE  -     LIPID PANEL  -     URINALYSIS W/ RFLX MICROSCOPIC    Obesity (BMI 30-39. 9)    Addressed weight, diet and exercise with patient. Decrease carbohydrates (white foods, sweet foods, sweet drinks and alcohol), increase green leafy vegetables and protein (lean meats and beans) with each meal. Avoid fried foods. Eat 3-5 small meals daily. Do not skip meals. Increase water intake. Increase physical activity to 30 minutes daily for health benefit or 60 minutes daily to prevent weight regain, as tolerated. Get 7-8 hours uninterrupted sleep nightly.    -     TSH 3RD GENERATION    Muscle spasm    Finished up robaxin. Better now. Frequent stools    Adv to use greek yogurt with metamucil    Vitamin D deficiency  -     VITAMIN D, 25 HYDROXY    Routine general medical examination at a health care facility    Seems healthy.   -     CBC WITH AUTOMATED DIFF  -     METABOLIC PANEL, COMPREHENSIVE  -     LIPID PANEL  -     TSH 3RD GENERATION  -     URINALYSIS W/ RFLX MICROSCOPIC    SVT (supraventricular tachycardia) (HCC)    On metoprolol. Mild intermittent asthma without complication  -     albuterol (PROVENTIL HFA, VENTOLIN HFA, PROAIR HFA) 90 mcg/actuation inhaler; Take 1 Puff by inhalation every four (4) hours as needed for Wheezing. Discussed expected course/resolution/complications of diagnosis in detail with patient. Medication risks/benefits/costs/interactions/alternatives discussed with patient. Pt was given an after visit summary which includes diagnoses, current medications & vitals. Pt expressed understanding with the diagnosis and plan.

## 2017-04-04 LAB
25(OH)D3+25(OH)D2 SERPL-MCNC: 11.5 NG/ML (ref 30–100)
ALBUMIN SERPL-MCNC: 3.9 G/DL (ref 3.5–5.5)
ALBUMIN/GLOB SERPL: 1.4 {RATIO} (ref 1.2–2.2)
ALP SERPL-CCNC: 152 IU/L (ref 39–117)
ALT SERPL-CCNC: 26 IU/L (ref 0–32)
APPEARANCE UR: CLEAR
AST SERPL-CCNC: 45 IU/L (ref 0–40)
BASOPHILS # BLD AUTO: 0.1 X10E3/UL (ref 0–0.2)
BASOPHILS NFR BLD AUTO: 1 %
BILIRUB SERPL-MCNC: 0.5 MG/DL (ref 0–1.2)
BILIRUB UR QL STRIP: NEGATIVE
BUN SERPL-MCNC: 10 MG/DL (ref 6–20)
BUN/CREAT SERPL: 16 (ref 9–23)
CALCIUM SERPL-MCNC: 9.2 MG/DL (ref 8.7–10.2)
CHLORIDE SERPL-SCNC: 102 MMOL/L (ref 96–106)
CHOLEST SERPL-MCNC: 193 MG/DL (ref 100–199)
CO2 SERPL-SCNC: 27 MMOL/L (ref 18–29)
COLOR UR: YELLOW
CREAT SERPL-MCNC: 0.63 MG/DL (ref 0.57–1)
EOSINOPHIL # BLD AUTO: 0.3 X10E3/UL (ref 0–0.4)
EOSINOPHIL NFR BLD AUTO: 5 %
ERYTHROCYTE [DISTWIDTH] IN BLOOD BY AUTOMATED COUNT: 13.8 % (ref 12.3–15.4)
GLOBULIN SER CALC-MCNC: 2.8 G/DL (ref 1.5–4.5)
GLUCOSE SERPL-MCNC: 83 MG/DL (ref 65–99)
GLUCOSE UR QL: NEGATIVE
HCT VFR BLD AUTO: 46.2 % (ref 34–46.6)
HDLC SERPL-MCNC: 61 MG/DL
HGB BLD-MCNC: 15.1 G/DL (ref 11.1–15.9)
HGB UR QL STRIP: NEGATIVE
IMM GRANULOCYTES # BLD: 0 X10E3/UL (ref 0–0.1)
IMM GRANULOCYTES NFR BLD: 0 %
INTERPRETATION, 910389: NORMAL
KETONES UR QL STRIP: NEGATIVE
LDLC SERPL CALC-MCNC: 112 MG/DL (ref 0–99)
LEUKOCYTE ESTERASE UR QL STRIP: NEGATIVE
LYMPHOCYTES # BLD AUTO: 1.8 X10E3/UL (ref 0.7–3.1)
LYMPHOCYTES NFR BLD AUTO: 25 %
MCH RBC QN AUTO: 31 PG (ref 26.6–33)
MCHC RBC AUTO-ENTMCNC: 32.7 G/DL (ref 31.5–35.7)
MCV RBC AUTO: 95 FL (ref 79–97)
MICRO URNS: NORMAL
MONOCYTES # BLD AUTO: 0.7 X10E3/UL (ref 0.1–0.9)
MONOCYTES NFR BLD AUTO: 10 %
NEUTROPHILS # BLD AUTO: 4.1 X10E3/UL (ref 1.4–7)
NEUTROPHILS NFR BLD AUTO: 59 %
NITRITE UR QL STRIP: NEGATIVE
PH UR STRIP: 6 [PH] (ref 5–7.5)
PLATELET # BLD AUTO: 318 X10E3/UL (ref 150–379)
POTASSIUM SERPL-SCNC: 4.3 MMOL/L (ref 3.5–5.2)
PROT SERPL-MCNC: 6.7 G/DL (ref 6–8.5)
PROT UR QL STRIP: NEGATIVE
RBC # BLD AUTO: 4.87 X10E6/UL (ref 3.77–5.28)
SODIUM SERPL-SCNC: 141 MMOL/L (ref 134–144)
SP GR UR: 1.02 (ref 1–1.03)
TRIGL SERPL-MCNC: 102 MG/DL (ref 0–149)
TSH SERPL DL<=0.005 MIU/L-ACNC: 2.04 UIU/ML (ref 0.45–4.5)
UROBILINOGEN UR STRIP-MCNC: 0.2 MG/DL (ref 0.2–1)
VLDLC SERPL CALC-MCNC: 20 MG/DL (ref 5–40)
WBC # BLD AUTO: 6.9 X10E3/UL (ref 3.4–10.8)

## 2017-04-05 DIAGNOSIS — E55.9 VITAMIN D DEFICIENCY: Primary | ICD-10-CM

## 2017-04-05 DIAGNOSIS — R79.89 ELEVATED LFTS: ICD-10-CM

## 2017-04-05 RX ORDER — ERGOCALCIFEROL 1.25 MG/1
50000 CAPSULE ORAL
Qty: 4 CAP | Refills: 3 | Status: SHIPPED | OUTPATIENT
Start: 2017-04-05 | End: 2017-05-04 | Stop reason: ALTCHOICE

## 2017-04-05 NOTE — PROGRESS NOTES
vit D level very low.will start on vit D 50,000 unit 1 cap weekly for 4 months. will repeat level in 4 months. adv to be on milk product and expose to sun for 20 min a day. elevated AST,avoid drinking alcohol. repeat AST,ALP in 1 month. LDL is high.watch fatty food.

## 2017-04-13 ENCOUNTER — OFFICE VISIT (OUTPATIENT)
Dept: INTERNAL MEDICINE CLINIC | Age: 37
End: 2017-04-13

## 2017-04-13 ENCOUNTER — HOSPITAL ENCOUNTER (OUTPATIENT)
Dept: LAB | Age: 37
Discharge: HOME | End: 2017-04-13
Payer: COMMERCIAL

## 2017-04-13 VITALS
DIASTOLIC BLOOD PRESSURE: 90 MMHG | HEIGHT: 66 IN | HEART RATE: 63 BPM | WEIGHT: 226 LBS | TEMPERATURE: 98.4 F | OXYGEN SATURATION: 96 % | BODY MASS INDEX: 36.32 KG/M2 | SYSTOLIC BLOOD PRESSURE: 145 MMHG | RESPIRATION RATE: 20 BRPM

## 2017-04-13 DIAGNOSIS — Z01.419 WELL WOMAN EXAM: Primary | ICD-10-CM

## 2017-04-13 DIAGNOSIS — I47.1 PAROXYSMAL SVT (SUPRAVENTRICULAR TACHYCARDIA) (HCC): ICD-10-CM

## 2017-04-13 PROCEDURE — 88175 CYTOPATH C/V AUTO FLUID REDO: CPT | Performed by: NURSE PRACTITIONER

## 2017-04-13 PROCEDURE — 87625 HPV TYPES 16 & 18 ONLY: CPT | Performed by: NURSE PRACTITIONER

## 2017-04-13 PROCEDURE — 87624 HPV HI-RISK TYP POOLED RSLT: CPT | Performed by: NURSE PRACTITIONER

## 2017-04-13 NOTE — MR AVS SNAPSHOT
Visit Information Date & Time Provider Department Dept. Phone Encounter #  
 4/13/2017  1:30 PM Ravin Sol, [address] [facility] Internal Medicine [phone] [id number] Your Appointments 4/18/2017  2:00 PM  
ESTABLISHED PATIENT with Manuel Primrose, MD  
[facility] ([address]) Appt Note: 6 month follow up  
 [address]  
[facility] [address]  
  
    
 7/26/2017  3:00 PM  
ESTABLISHED PATIENT with Manuel Primrose, MD  
[facility] ([address]) Appt Note: one year follow up  
 [address] [facility] [address]  
[phone] Upcoming Health Maintenance Date Due Pneumococcal 19-64 Medium Risk (1 of 1 - PPSV23) 8/5/1999 DTaP/Tdap/Td series (1 - Tdap) 8/5/2001 PAP AKA CERVICAL CYTOLOGY 8/5/2001 INFLUENZA AGE 9 TO ADULT 8/1/2016 Allergies as of 4/13/2017  Review Complete On: 4/13/2017 By: Ravin Sol NP Severity Noted Reaction Type Reactions Codeine  03/15/2011    Unknown (comments) Hydrocodone  10/03/2014    Other (comments) Pcn [Penicillins]  03/15/2011    Unknown (comments) Zithromax [Azithromycin]  03/15/2011    Rash Current Immunizations  Never Reviewed No immunizations on file. Not reviewed this visit Vitals BP Pulse Temp Resp Height(growth percentile) Weight(growth percentile) 145/90 63 98.4 °F (36.9 °C) (Oral) 20 5' 5.5\" (1.664 m) 226 lb (102.5 kg) LMP SpO2 BMI OB Status Smoking Status 03/30/2017 (Exact Date) 96% 37.04 kg/m2 Having regular periods Light Tobacco Smoker Vitals History BMI and BSA Data Body Mass Index Body Surface Area 37.04 kg/m 2 2.18 m 2 Preferred Pharmacy Pharmacy Name Phone [facility] [address] [place] [address] [phone] Your Updated Medication List  
  
   
 This list is accurate as of: 4/13/17  2:07 PM.  Always use your most recent med list.  
  
  
  
  
 albuterol 90 mcg/actuation inhaler Commonly known as:  PROVENTIL HFA, VENTOLIN HFA, PROAIR HFA Take 1 Puff by inhalation every four (4) hours as needed for Wheezing. diclofenac potassium 50 mg tablet Commonly known as:  CATAFLAM  
Take 1 Tab by mouth three (3) times daily. Indications: Pain  
  
 ergocalciferol 50,000 unit capsule Commonly known as:  ERGOCALCIFEROL Take 1 Cap by mouth every seven (7) days. methocarbamol 500 mg tablet Commonly known as:  ROBAXIN Take 1 Tab by mouth three (3) times daily. metoprolol tartrate 50 mg tablet Commonly known as:  LOPRESSOR Take 1 Tab by mouth two (2) times a day. Omega-3-DHA-EPA-Fish Oil 1,000 mg (120 mg-180 mg) Cap Take 1,000 mg by mouth daily. ONE-A-DAY WOMENS FORMULA PO Take  by mouth. Patient Instructions Well Visit, Ages 25 to 48: Care Instructions Your Care Instructions Physical exams can help you stay healthy. Your doctor has checked your overall health and may have suggested ways to take good care of yourself. He or she also may have recommended tests. At home, you can help prevent illness with healthy eating, regular exercise, and other steps. Follow-up care is a key part of your treatment and safety. Be sure to make and go to all appointments, and call your doctor if you are having problems. It's also a good idea to know your test results and keep a list of the medicines you take. How can you care for yourself at home? · Reach and stay at a healthy weight. This will lower your risk for many problems, such as obesity, diabetes, heart disease, and high blood pressure. · Get at least 30 minutes of physical activity on most days of the week. Walking is a good choice. You also may want to do other activities, such as running, swimming, cycling, or playing tennis or team sports.  Discuss any changes in your exercise program with your doctor. · Do not smoke or allow others to smoke around you. If you need help quitting, talk to your doctor about stop-smoking programs and medicines. These can increase your chances of quitting for good. · Talk to your doctor about whether you have any risk factors for sexually transmitted infections (STIs). Having one sex partner (who does not have STIs and does not have sex with anyone else) is a good way to avoid these infections. · Use birth control if you do not want to have children at this time. Talk with your doctor about the choices available and what might be best for you. · Protect your skin from too much sun. When you're outdoors from 10 a.m. to 4 p.m., stay in the shade or cover up with clothing and a hat with a wide brim. Wear sunglasses that block UV rays. Even when it's cloudy, put broad-spectrum sunscreen (SPF 30 or higher) on any exposed skin. · See a dentist one or two times a year for checkups and to have your teeth cleaned. · Wear a seat belt in the car. · Drink alcohol in moderation, if at all. That means no more than 2 drinks a day for men and 1 drink a day for women. Follow your doctor's advice about when to have certain tests. These tests can spot problems early. For everyone · Cholesterol. Have the fat (cholesterol) in your blood tested after age 21. Your doctor will tell you how often to have this done based on your age, family history, or other things that can increase your risk for heart disease. · Blood pressure. Have your blood pressure checked during a routine doctor visit. Your doctor will tell you how often to check your blood pressure based on your age, your blood pressure results, and other factors. · Vision. Talk with your doctor about how often to have a glaucoma test. 
· Diabetes. Ask your doctor whether you should have tests for diabetes. · Colon cancer. Have a test for colon cancer at age 48.  You may have one of several tests. If you are younger than 48, you may need a test earlier if you have any risk factors. Risk factors include whether you already had a precancerous polyp removed from your colon or whether your parent, brother, sister, or child has had colon cancer. For women · Breast exam and mammogram. Talk to your doctor about when you should have a clinical breast exam and a mammogram. Medical experts differ on whether and how often women under 50 should have these tests. Your doctor can help you decide what is right for you. · Pap test and pelvic exam. Begin Pap tests at age 24. A Pap test is the best way to find cervical cancer. The test often is part of a pelvic exam. Ask how often to have this test. 
· Tests for sexually transmitted infections (STIs). Ask whether you should have tests for STIs. You may be at risk if you have sex with more than one person, especially if your partners do not wear condoms. For men · Tests for sexually transmitted infections (STIs). Ask whether you should have tests for STIs. You may be at risk if you have sex with more than one person, especially if you do not wear a condom. · Testicular cancer exam. Ask your doctor whether you should check your testicles regularly. · Prostate exam. Talk to your doctor about whether you should have a blood test (called a PSA test) for prostate cancer. Experts differ on whether and when men should have this test. Some experts suggest it if you are older than 39 and are -American or have a father or brother who got prostate cancer when he was younger than 72. When should you call for help? Watch closely for changes in your health, and be sure to contact your doctor if you have any problems or symptoms that concern you. Where can you learn more? Go to http://jaimie-magdy.info/. Enter P072 in the search box to learn more about \"Well Visit, Ages 25 to 48: Care Instructions. \" Current as of: July 19, 2016 Content Version: 11.2 © 3243-0362 [employer]. Care instructions adapted under license by [employer] (which disclaims liability or warranty for this information). If you have questions about a medical condition or this instruction, always ask your healthcare professional. [address] any warranty or liability for your use of this information. Please provide this summary of care documentation to your next provider. Your primary care clinician is listed as Nilesh Solitario. If you have any questions after today's visit, please call [phone].

## 2017-04-13 NOTE — ACP (ADVANCE CARE PLANNING)
Chief Complaint   Patient presents with   Vivas Fought Exam     Reviewed record  In preparation for visit and have obtained necessary documentation. .  1. Have you been to the ER, urgent care clinic since your last visit? Hospitalized since your last visit? No    2. Have you seen or consulted any other health care providers outside of the [address] [place] [address] since your last visit? Include any pap smears or colon screening.  No    Used 2 patient I. D. 's

## 2017-04-13 NOTE — PATIENT INSTRUCTIONS

## 2017-04-13 NOTE — PROGRESS NOTES
HISTORY OF PRESENT ILLNESS  Keila Lechuga is a 39 y.o. female. This patient presents today for well woman exam and pap smear. The patient states she was previously followed by Sarah Foster; however, her insurance is no longer accepted. The patient states she had abnormal pap smear about 6 months ago and had colposcopy. She was recommended to follow-up in office in 6 months for repeat pap smear. Patient requests pap smear today. She states that she is generally feeling well and is having regular periods. Visit Vitals    /90    Pulse 63    Temp 98.4 °F (36.9 °C) (Oral)    Resp 20    Ht 5' 5.5\" (1.664 m)    Wt 226 lb (102.5 kg)    SpO2 96%    BMI 37.04 kg/m2     HPI    Review of Systems   Constitutional: Negative for chills and fever. HENT: Negative for congestion. Eyes: Negative for blurred vision. Respiratory: Negative for cough, shortness of breath and wheezing. Cardiovascular: Negative for chest pain, palpitations and leg swelling. Gastrointestinal: Negative for abdominal pain. Genitourinary: Negative. Musculoskeletal: Negative. Skin: Negative. Neurological: Negative for dizziness, weakness and headaches. Endo/Heme/Allergies: Negative. Psychiatric/Behavioral: Negative. Physical Exam   Constitutional: She is oriented to person, place, and time. She appears well-developed and well-nourished. No distress. HENT:   Head: Normocephalic and atraumatic. Cardiovascular: Normal rate and regular rhythm. Pulmonary/Chest: Effort normal and breath sounds normal. No respiratory distress. She has no wheezes. She has no rales. Genitourinary: No breast swelling, tenderness, discharge or bleeding. There is no rash on the right labia. There is no rash on the left labia. Cervix exhibits no motion tenderness. No erythema, tenderness or bleeding in the vagina. No foreign body in the vagina. No signs of injury around the vagina. No vaginal discharge found. Musculoskeletal: She exhibits no edema. Neurological: She is alert and oriented to person, place, and time. Skin: Skin is dry. Psychiatric: She has a normal mood and affect. Her behavior is normal.   Nursing note and vitals reviewed. ASSESSMENT and PLAN    ICD-10-CM ICD-9-CM    1. Well woman exam Z01.419 V72.31 In office, collected      PAP IG, APTIMA HPV AND RFX 16/18,45 ([id number])  For future care, REFERRAL TO GYNECOLOGY   2. Paroxysmal SVT (supraventricular tachycardia) (HCC) I47.1 427.0 Patient requests REFERRAL TO CARDIOLOGY  She has been following with Dr. Ynes Kumar.     Lab results and schedule of future lab studies reviewed with patient  Reviewed diet, exercise and weight control  Reviewed medications and side effects in detail  Patient encouraged to call or return to office if symptoms do not improve or worsen. Reviewed plan of care with patient who acknowledges understanding and agrees.

## 2017-04-17 ENCOUNTER — TELEPHONE (OUTPATIENT)
Dept: CARDIOLOGY CLINIC | Age: 37
End: 2017-04-17

## 2017-04-17 NOTE — TELEPHONE ENCOUNTER
Patient identified with 2 identifiers  Called patient-she states she does not need \"work excuse\"-she just needs a letter explaining that she needs a letter stating that she has palpitations. Ok to write a letter or do you want to wait until she comes in for her appointment? Future Appointments  Date Time Provider Bassem Jack   5/3/2017 1:20 PM Betty Acuna  [address]   7/26/2017 3:00 PM Betty Acuna  [address]     Patient identified with 2 identifiers  Called patient and per Dr. Emerson Devine, have sent a letter stating she has benign palpitations. She will pick it up on Wednesday.

## 2017-04-17 NOTE — TELEPHONE ENCOUNTER
Pt called stating she didn't go to work on yesterday because she has an episode (she never said what type of episode) and need a letter explaining to her employer she has a heart condition. She can be reached at [phone].  [employer]

## 2017-04-19 NOTE — PROGRESS NOTES
Negative Pap smear with positive HPV screen. Follow-up with GYN in 6 months for repeat evaluation, as recommended at visit.

## 2017-05-04 ENCOUNTER — OFFICE VISIT (OUTPATIENT)
Dept: INTERNAL MEDICINE CLINIC | Age: 37
End: 2017-05-04

## 2017-05-04 VITALS
DIASTOLIC BLOOD PRESSURE: 80 MMHG | HEART RATE: 50 BPM | RESPIRATION RATE: 20 BRPM | TEMPERATURE: 96.9 F | SYSTOLIC BLOOD PRESSURE: 160 MMHG | HEIGHT: 66 IN | OXYGEN SATURATION: 98 %

## 2017-05-04 DIAGNOSIS — E55.9 VITAMIN D DEFICIENCY: ICD-10-CM

## 2017-05-04 DIAGNOSIS — I10 ESSENTIAL HYPERTENSION: Primary | ICD-10-CM

## 2017-05-04 DIAGNOSIS — J30.1 ALLERGIC RHINITIS DUE TO POLLEN, UNSPECIFIED RHINITIS SEASONALITY: ICD-10-CM

## 2017-05-04 DIAGNOSIS — I47.1 SVT (SUPRAVENTRICULAR TACHYCARDIA) (HCC): ICD-10-CM

## 2017-05-04 DIAGNOSIS — J06.9 URTI (ACUTE UPPER RESPIRATORY INFECTION): ICD-10-CM

## 2017-05-04 RX ORDER — AMOXICILLIN 875 MG/1
875 TABLET, FILM COATED ORAL 2 TIMES DAILY
Qty: 14 TAB | Refills: 0 | Status: SHIPPED | OUTPATIENT
Start: 2017-05-04 | End: 2017-07-26 | Stop reason: ALTCHOICE

## 2017-05-04 RX ORDER — PREDNISONE 5 MG/1
TABLET ORAL
Qty: 1 PACKAGE | Refills: 0 | Status: SHIPPED | OUTPATIENT
Start: 2017-05-04 | End: 2017-07-26 | Stop reason: ALTCHOICE

## 2017-05-04 RX ORDER — ERGOCALCIFEROL 1.25 MG/1
50000 CAPSULE ORAL
Qty: 4 CAP | Refills: 2 | Status: SHIPPED | OUTPATIENT
Start: 2017-05-04 | End: 2017-09-05

## 2017-05-04 NOTE — MR AVS SNAPSHOT
Visit Information Date & Time Provider Department Dept. Phone Encounter #  
 5/4/2017  1:30 PM Neva Silva, [address] [facility] Internal Medicine [phone] [id number] Your Appointments 7/26/2017  3:00 PM  
ESTABLISHED PATIENT with Jhonathan Boo MD  
[facility] ([facility]) Appt Note: one year follow up  
 [address] [facility] [address]  
[address] [id number] Upcoming Health Maintenance Date Due Pneumococcal 19-64 Medium Risk (1 of 1 - PPSV23) 8/5/1999 DTaP/Tdap/Td series (1 - Tdap) 8/5/2001 INFLUENZA AGE 9 TO ADULT 8/1/2017 PAP AKA CERVICAL CYTOLOGY 4/13/2020 Allergies as of 5/4/2017  Review Complete On: 5/4/2017 By: Neva Silva MD  
  
 Severity Noted Reaction Type Reactions Codeine  03/15/2011    Unknown (comments) Hydrocodone  10/03/2014    Other (comments) Pcn [Penicillins]  03/15/2011    Unknown (comments) Zithromax [Azithromycin]  03/15/2011    Rash Current Immunizations  Never Reviewed No immunizations on file. Not reviewed this visit You Were Diagnosed With   
  
 Codes Comments Essential hypertension    -  Primary ICD-10-CM: I10 
ICD-9-CM: 401.9 Allergic rhinitis due to pollen, unspecified rhinitis seasonality     ICD-10-CM: J30.1 ICD-9-CM: 477.0   
 URTI (acute upper respiratory infection)     ICD-10-CM: J06.9 ICD-9-CM: 465.9 SVT (supraventricular tachycardia) (HCC)     ICD-10-CM: I47.1 ICD-9-CM: 427.89 Vitamin D deficiency     ICD-10-CM: E55.9 ICD-9-CM: 268.9 Vitals BP Pulse Temp Resp Height(growth percentile) LMP  
 (!) 155/98 (BP 1 Location: Left arm, BP Patient Position: Sitting) (!) 50 96.9 °F (36.1 °C) (Oral) 20 5' 5.5\" (1.664 m) 03/30/2017 (Exact Date) SpO2 OB Status Smoking Status 98% Having regular periods Light Tobacco Smoker Vitals History Preferred Pharmacy Pharmacy Name Phone [facility] [address] [place] [address] [phone] Your Updated Medication List  
  
   
This list is accurate as of: 5/4/17  2:07 PM.  Always use your most recent med list.  
  
  
  
  
 albuterol 90 mcg/actuation inhaler Commonly known as:  PROVENTIL HFA, VENTOLIN HFA, PROAIR HFA Take 1 Puff by inhalation every four (4) hours as needed for Wheezing. amoxicillin 875 mg tablet Commonly known as:  AMOXIL Take 1 Tab by mouth two (2) times a day. Able to take amoxicillin  
  
 diclofenac potassium 50 mg tablet Commonly known as:  CATAFLAM  
Take 1 Tab by mouth three (3) times daily. Indications: Pain  
  
 ergocalciferol 50,000 unit capsule Commonly known as:  ERGOCALCIFEROL Take 1 Cap by mouth every seven (7) days. methocarbamol 500 mg tablet Commonly known as:  ROBAXIN Take 1 Tab by mouth three (3) times daily. metoprolol tartrate 50 mg tablet Commonly known as:  LOPRESSOR Take 1 Tab by mouth two (2) times a day. Omega-3-DHA-EPA-Fish Oil 1,000 mg (120 mg-180 mg) Cap Take 1,000 mg by mouth daily. ONE-A-DAY WOMENS FORMULA PO Take  by mouth.  
  
 predniSONE 5 mg dose pack Commonly known as:  STERAPRED As directed Prescriptions Sent to Pharmacy Refills  
 predniSONE (STERAPRED) 5 mg dose pack 0 Sig: As directed Class: Normal  
 Pharmacy: [facility] [address] [place] [address] Ph #: [phone]  
 amoxicillin (AMOXIL) 875 mg tablet 0 Sig: Take 1 Tab by mouth two (2) times a day. Able to take amoxicillin Class: Normal  
 Pharmacy: [facility] [address] [place] [address] Ph #: [phone] Route: Oral  
 ergocalciferol (ERGOCALCIFEROL) 50,000 unit capsule 2 Sig: Take 1 Cap by mouth every seven (7) days. Class: Normal  
 Pharmacy: [facility] [address] [place] [address] Ph #: [phone]  Route: Oral  
  
 Please provide this summary of care documentation to your next provider. Your primary care clinician is listed as Gee Oglesby. If you have any questions after today's visit, please call [phone].

## 2017-05-04 NOTE — PROGRESS NOTES
HISTORY OF PRESENT ILLNESS  Keila Bonilla is a 39 y.o. female here with C/O cough and post nasaldrip and nasal  congestion for 5 days. cough is getting worse. no fever or SOB. Noticed to have elevated BP.already on metoprolol for SVT. No headache.watching salt. Had congenital heart surgery,seen by cardiologist,stable. Lab work discussed. need vit D refill. Cold Symptoms     Hypertension          Review of Systems   Constitutional: Negative. HENT: Negative. Eyes: Negative. Respiratory: Negative. Cardiovascular: Negative. Gastrointestinal: Negative. Genitourinary: Negative. Musculoskeletal: Negative. Skin: Negative. Psychiatric/Behavioral: Negative. Physical Exam   Constitutional: She appears well-developed and well-nourished. No distress. HENT:   Head: Normocephalic and atraumatic. Right Ear: External ear normal.   Left Ear: External ear normal.   Mouth/Throat: Oropharynx is clear and moist. No oropharyngeal exudate. Nasal turbinates:red inflamed,NT    Cobble stoning present. Neck: Normal range of motion. Neck supple. No JVD present. No thyromegaly present. Cardiovascular: Normal rate, regular rhythm, normal heart sounds and intact distal pulses. Pulmonary/Chest: Effort normal and breath sounds normal. No respiratory distress. She has no wheezes. Musculoskeletal: She exhibits no edema or tenderness. Back:NT ROm OK   Lymphadenopathy:     She has no cervical adenopathy. Psychiatric: She has a normal mood and affect. Her behavior is normal.       ASSESSMENT and PLAN  Keila was seen today for cold symptoms and hypertension. Diagnoses and all orders for this visit:    Essential hypertension    Elevated still. Her pulse is too low. She mention she is tired. would like to reduce metoprolol dose by changing it to  tenorectic 50. Pt would like to see  and then decide her BP med. Since she ahd SVT,will have D.rwu decide on dosage.       Allergic rhinitis due to pollen, unspecified rhinitis seasonality    Will call in,  -     predniSONE (STERAPRED) 5 mg dose pack; As directed    URTI (acute upper respiratory infection)    Will call in,  -     amoxicillin (AMOXIL) 875 mg tablet; Take 1 Tab by mouth two (2) times a day. Able to take amoxicillin    SVT (supraventricular tachycardia) (HCC)    On metoprolol. Vitamin D deficiency    Took for a month. Will refill,  -     ergocalciferol (ERGOCALCIFEROL) 50,000 unit capsule; Take 1 Cap by mouth every seven (7) days. Discussed expected course/resolution/complications of diagnosis in detail with patient. Medication risks/benefits/costs/interactions/alternatives discussed with patient. Pt was given an after visit summary which includes diagnoses, current medications & vitals. Pt expressed understanding with the diagnosis and plan.

## 2017-05-08 ENCOUNTER — TELEPHONE (OUTPATIENT)
Dept: INTERNAL MEDICINE CLINIC | Age: 37
End: 2017-05-08

## 2017-05-08 NOTE — TELEPHONE ENCOUNTER
Call placed to pt and she stated she is still currently on ABT and sterapack, advisd to continue per MD instruction. Pt denies fever or worsening of her sx from OV.  Advised pt to rest, drink plenty of fluids and treat sx with OTC medication if needed and to notify office if she were to worsen, start fever, etc

## 2017-05-08 NOTE — TELEPHONE ENCOUNTER
Pt called stating the medication that was prescribed for her are not helping her nor are they making it worse. Pt is still coughing and nasally. Please call pt.

## 2017-06-14 ENCOUNTER — TELEPHONE (OUTPATIENT)
Dept: INTERNAL MEDICINE CLINIC | Age: 37
End: 2017-06-14

## 2017-06-14 NOTE — TELEPHONE ENCOUNTER
----- Message from Bonna Paget sent at 6/12/2017  5:01 PM EDT -----  Regarding: FW: Dr. Cornelius Rivero      ----- Message -----     From: Edwin Marin     Sent: 6/1/2017   4:14 PM       To: Bonna Paget  Subject: FW: Dr. Cornelius Rivero                                ----- Message -----     From: Issa Martínez     Sent: 6/1/2017   2:39 PM       To: \[facility]\"" Front Office  Subject: Dr. Cornelius Rivero                                Pt stated that she would like for a call back in regards to a referral for a cardiologist Dr. Luz Horton. Her best contact number is [phone].

## 2017-07-04 DIAGNOSIS — R79.89 ELEVATED LFTS: ICD-10-CM

## 2017-07-26 ENCOUNTER — OFFICE VISIT (OUTPATIENT)
Dept: CARDIOLOGY CLINIC | Age: 37
End: 2017-07-26

## 2017-07-26 VITALS
RESPIRATION RATE: 16 BRPM | WEIGHT: 229.8 LBS | HEART RATE: 66 BPM | HEIGHT: 66 IN | DIASTOLIC BLOOD PRESSURE: 80 MMHG | BODY MASS INDEX: 36.93 KG/M2 | SYSTOLIC BLOOD PRESSURE: 120 MMHG

## 2017-07-26 DIAGNOSIS — Q21.10 RESIDUAL ASD (ATRIAL SEPTAL DEFECT) FOLLOWING REPAIR: ICD-10-CM

## 2017-07-26 DIAGNOSIS — Q25.1 AORTA COARCTATION: ICD-10-CM

## 2017-07-26 DIAGNOSIS — I47.1 PAROXYSMAL SVT (SUPRAVENTRICULAR TACHYCARDIA) (HCC): Primary | ICD-10-CM

## 2017-07-26 NOTE — LETTER
7/26/2017 3:49 PM 
 
Ms. Chaka Espinoza [address] [phone] TO whom it may concern; Ms Paula Dixon is under my cardiac care due to her heart racing at times. Any questions please call the office. Sincerely, Mikaela Zapata MD

## 2017-07-26 NOTE — PROGRESS NOTES
[place]: [place]  [phone]) [phone]    HPI:  Ms. Kristi Alvarez is a 27 yo F with h/o pre-hypertension, congential heart disease (surgery at age 11 at [facility]- \"hole\" on the outside), asthma and KAYLI initially seen for frequent and bothersome palpitations. Associated symptoms included shortness of breath, lightheadedness, diaphoretic, pale and clammy and sometimes left sided neck pain. Event monitor demonstrated episodes of SVT with HR > 150 bpm and replaced hyzaar with metoprolol. 8/2014 echo was normal.  Cardiac MRI 10/2016 demonstrated that the previously placed ASD patch with no residual shunting; there was a persistent left SVC between the left atrial appendage and the left superior pulmonary vein to join the coronary sinus. There was also a mild coarctation of the aorta between the ascending and aortic arch in the proximal descending thoracic aorta. The coarctation measured 14 mm in diameter; distal to the coarctation post dilation at 28 mm and paroximally 29 mm. The ascending aorta was dilated at 43 x 38 mm. Since her last visit, she has been doing ok. Has occasional palpitations. No associated lightheadedness/dizziness or syncope. Breathing has been stable, some baseline exertional shortness of breath. No chest pains. She is compensated on exam.    Assessment and Plan:   1. Paroxysmal supraventricular tachycardia. Has been overall controlled on a beta-blocker, though she has had occasional palpitations. F/u in 1 yr.  2. Aortic coarctation. CT scan in 10/2017, 1 yr f/u imaging. Depending on results may need annual surveillance. 3. ASD repair. There was no residual shunting on the cardiac MRI. 4. Persistent SVC. 5. Tobacco use. Encouraged the importance of cessation. She  has a past medical history of Allergy; Anxiety; Asthma; and Congenital heart disease. She also has no past medical history of Myocardial infarction [facility]) or Pulmonary embolism ([facility] [address]     All other systems negative except as above. PE  Vitals:    07/26/17 1509   BP: 120/80   Pulse: 66   Resp: 16   Weight: 229 lb 12.8 oz (104.2 kg)   Height: 5' 5.5\" (1.664 m)    Body mass index is 37.66 kg/(m^2).    General appearance - alert, well appearing, and in no distress  Mental status - affect appropriate to mood  Eyes - sclera anicteric, moist mucous membranes  Neck - supple, no significant adenopathy, no bruits, no JVD  Chest - clear to auscultation, no wheezes, rales or rhonchi  Heart - normal rate, regular rhythm, normal S1, S2, I/VI systolic murmur LUSB  Abdomen - soft, nontender, nondistended, no masses or organomegaly  Extremities - peripheral pulses normal, no pedal edema      Recent Labs:  Lab Results   Component Value Date/Time    Cholesterol, total 193 04/03/2017 10:16 AM    HDL Cholesterol 61 04/03/2017 10:16 AM    LDL, calculated 112 04/03/2017 10:16 AM    Triglyceride 102 04/03/2017 10:16 AM     Lab Results   Component Value Date/Time    Creatinine 0.63 04/03/2017 10:16 AM     Lab Results   Component Value Date/Time    BUN 10 04/03/2017 10:16 AM     Lab Results   Component Value Date/Time    Potassium 4.3 04/03/2017 10:16 AM     No results found for: HBA1C, ZSH1VBGY  Lab Results   Component Value Date/Time    HGB 15.1 04/03/2017 10:16 AM     Lab Results   Component Value Date/Time    PLATELET 373 19/51/9213 10:16 AM       Reviewed:  Past Medical History:   Diagnosis Date    Allergy     Anxiety     Asthma     Congenital heart disease      History   Smoking Status    Light Tobacco Smoker    Packs/day: 0.25    Years: 2.00    Types: Cigarettes   Smokeless Tobacco    Never Used     History   Alcohol Use    0.0 oz/week    0 Standard drinks or equivalent per week     Comment: socially     Allergies   Allergen Reactions    Codeine Unknown (comments)    Hydrocodone Other (comments)    Pcn [Penicillins] Unknown (comments)    Zithromax [Azithromycin] Rash       Current Outpatient Prescriptions   Medication Sig    ergocalciferol (ERGOCALCIFEROL) 50,000 unit capsule Take 1 Cap by mouth every seven (7) days.  albuterol (PROVENTIL HFA, VENTOLIN HFA, PROAIR HFA) 90 mcg/actuation inhaler Take 1 Puff by inhalation every four (4) hours as needed for Wheezing.  diclofenac potassium (CATAFLAM) 50 mg tablet Take 1 Tab by mouth three (3) times daily. Indications: Pain    methocarbamol (ROBAXIN) 500 mg tablet Take 1 Tab by mouth three (3) times daily.  metoprolol tartrate (LOPRESSOR) 50 mg tablet Take 1 Tab by mouth two (2) times a day.  MULTIVITS,CA,MINERALS/IRON/FA (ONE-A-DAY WOMENS FORMULA PO) Take  by mouth.  Omega-3-DHA-EPA-Fish Oil 1,000 (120-180) mg cap Take 1,000 mg by mouth daily. No current facility-administered medications for this visit.         MD Monica Farrell [facility] [place]  [address] [facility] [address]  [facility], [address]

## 2017-07-26 NOTE — MR AVS SNAPSHOT
Visit Information Date & Time Provider Department Dept. Phone Encounter #  
 7/26/2017  3:00 PM Virgen Nagel MD [facility] [place] [phone] [id number] Your Appointments 7/25/2018  3:00 PM  
ESTABLISHED PATIENT with Virgen Nagel MD  
[facility] ([facility]) Appt Note: one year follow up  
 [address] [facility] [address]  
[address] [facility] [address] Upcoming Health Maintenance Date Due Pneumococcal 19-64 Medium Risk (1 of 1 - PPSV23) 8/5/1999 DTaP/Tdap/Td series (1 - Tdap) 8/5/2001 INFLUENZA AGE 9 TO ADULT 8/1/2017 PAP AKA CERVICAL CYTOLOGY 4/13/2020 Allergies as of 7/26/2017  Review Complete On: 7/26/2017 By: Erinn Boykin Severity Noted Reaction Type Reactions Codeine  03/15/2011    Unknown (comments) Hydrocodone  10/03/2014    Other (comments) Pcn [Penicillins]  03/15/2011    Unknown (comments) Zithromax [Azithromycin]  03/15/2011    Rash Current Immunizations  Never Reviewed No immunizations on file. Not reviewed this visit You Were Diagnosed With   
  
 Codes Comments Residual ASD (atrial septal defect) following repair    -  Primary ICD-10-CM: Q21.1 ICD-9-CM: 414. 5 Aorta coarctation     ICD-10-CM: Q25.1 ICD-9-CM: 747.10 Paroxysmal SVT (supraventricular tachycardia) (HCC)     ICD-10-CM: I47.1 ICD-9-CM: 427.0 Vitals BP Pulse Resp Height(growth percentile) Weight(growth percentile) BMI  
 120/80 (BP 1 Location: Left arm, BP Patient Position: Sitting) 66 16 5' 5.5\" (1.664 m) 229 lb 12.8 oz (104.2 kg) 37.66 kg/m2 OB Status Smoking Status Having regular periods Light Tobacco Smoker Vitals History BMI and BSA Data Body Mass Index Body Surface Area  
 37.66 kg/m 2 2.19 m 2 Preferred Pharmacy Pharmacy Name Phone [facility] [address] [place] [address] [phone] Your Updated Medication List  
  
   
This list is accurate as of: 7/26/17  3:47 PM.  Always use your most recent med list.  
  
  
  
  
 albuterol 90 mcg/actuation inhaler Commonly known as:  PROVENTIL HFA, VENTOLIN HFA, PROAIR HFA Take 1 Puff by inhalation every four (4) hours as needed for Wheezing. diclofenac potassium 50 mg tablet Commonly known as:  CATAFLAM  
Take 1 Tab by mouth three (3) times daily. Indications: Pain  
  
 ergocalciferol 50,000 unit capsule Commonly known as:  ERGOCALCIFEROL Take 1 Cap by mouth every seven (7) days. methocarbamol 500 mg tablet Commonly known as:  ROBAXIN Take 1 Tab by mouth three (3) times daily. metoprolol tartrate 50 mg tablet Commonly known as:  LOPRESSOR Take 1 Tab by mouth two (2) times a day. Omega-3-DHA-EPA-Fish Oil 1,000 mg (120 mg-180 mg) Cap Take 1,000 mg by mouth daily. ONE-A-DAY WOMENS FORMULA PO Take  by mouth. Please provide this summary of care documentation to your next provider. Your primary care clinician is listed as Piyush Medina. If you have any questions after today's visit, please call [phone].

## 2017-08-03 RX ORDER — METOPROLOL TARTRATE 50 MG/1
TABLET ORAL
Qty: 60 TAB | Refills: 11 | Status: SHIPPED | OUTPATIENT
Start: 2017-08-03 | End: 2018-07-18 | Stop reason: SDUPTHER

## 2017-09-05 ENCOUNTER — HOSPITAL ENCOUNTER (EMERGENCY)
Age: 37
Discharge: HOME | End: 2017-09-05
Attending: EMERGENCY MEDICINE

## 2017-09-05 VITALS
BODY MASS INDEX: 37.65 KG/M2 | WEIGHT: 226 LBS | DIASTOLIC BLOOD PRESSURE: 111 MMHG | HEART RATE: 63 BPM | OXYGEN SATURATION: 95 % | SYSTOLIC BLOOD PRESSURE: 192 MMHG | TEMPERATURE: 98.2 F | HEIGHT: 65 IN | RESPIRATION RATE: 16 BRPM

## 2017-09-05 DIAGNOSIS — R19.7 DIARRHEA, UNSPECIFIED TYPE: ICD-10-CM

## 2017-09-05 DIAGNOSIS — I10 ESSENTIAL HYPERTENSION: ICD-10-CM

## 2017-09-05 DIAGNOSIS — R10.84 ABDOMINAL PAIN, GENERALIZED: Primary | ICD-10-CM

## 2017-09-05 NOTE — DISCHARGE INSTRUCTIONS
Diarrhea: Care Instructions  Your Care Instructions    Diarrhea is loose, watery stools (bowel movements). The exact cause is often hard to find. Sometimes diarrhea is your body's way of getting rid of what caused an upset stomach. Viruses, food poisoning, and many medicines can cause diarrhea. Some people get diarrhea in response to emotional stress, anxiety, or certain foods. Almost everyone has diarrhea now and then. It usually isn't serious, and your stools will return to normal soon. The important thing to do is replace the fluids you have lost, so you can prevent dehydration. The doctor has checked you carefully, but problems can develop later. If you notice any problems or new symptoms, get medical treatment right away. Follow-up care is a key part of your treatment and safety. Be sure to make and go to all appointments, and call your doctor if you are having problems. It's also a good idea to know your test results and keep a list of the medicines you take. How can you care for yourself at home? · Watch for signs of dehydration, which means your body has lost too much water. Dehydration is a serious condition and should be treated right away. Signs of dehydration are:  ¨ Increasing thirst and dry eyes and mouth. ¨ Feeling faint or lightheaded. ¨ Darker urine, and a smaller amount of urine than normal.  · To prevent dehydration, drink plenty of fluids, enough so that your urine is light yellow or clear like water. Choose water and other caffeine-free clear liquids until you feel better. If you have kidney, heart, or liver disease and have to limit fluids, talk with your doctor before you increase the amount of fluids you drink. · Begin eating small amounts of mild foods the next day, if you feel like it. ¨ Try yogurt that has live cultures of Lactobacillus. (Check the label.)  ¨ Avoid spicy foods, fruits, alcohol, and caffeine until 48 hours after all symptoms are gone.   ¨ Avoid chewing gum that contains sorbitol. ¨ Avoid dairy products (except for yogurt with Lactobacillus) while you have diarrhea and for 3 days after symptoms are gone. · The doctor may recommend that you take over-the-counter medicine, such as loperamide (Imodium), if you still have diarrhea after 6 hours. Read and follow all instructions on the label. Do not use this medicine if you have bloody diarrhea, a high fever, or other signs of serious illness. Call your doctor if you think you are having a problem with your medicine. When should you call for help? Call 911 anytime you think you may need emergency care. For example, call if:  · You passed out (lost consciousness). · Your stools are maroon or very bloody. Call your doctor now or seek immediate medical care if:  · You are dizzy or lightheaded, or you feel like you may faint. · Your stools are black and look like tar, or they have streaks of blood. · You have new or worse belly pain. · You have symptoms of dehydration, such as:  ¨ Dry eyes and a dry mouth. ¨ Passing only a little dark urine. ¨ Feeling thirstier than usual.  · You have a new or higher fever. Watch closely for changes in your health, and be sure to contact your doctor if:  · Your diarrhea is getting worse. · You see pus in the diarrhea. · You are not getting better after 2 days (48 hours). Where can you learn more? Go to http://jaimie-magdy.info/. Enter P608 in the search box to learn more about \"Diarrhea: Care Instructions. \"  Current as of: March 20, 2017  Content Version: 11.3  © 4553-4024 "[employer]". Care instructions adapted under license by [employer] (which disclaims liability or warranty for this information). If you have questions about a medical condition or this instruction, always ask your healthcare professional. [address] any warranty or liability for your use of this information.        Abdominal Pain: Care Instructions  Your Care Instructions    Abdominal pain has many possible causes. Some aren't serious and get better on their own in a few days. Others need more testing and treatment. If your pain continues or gets worse, you need to be rechecked and may need more tests to find out what is wrong. You may need surgery to correct the problem. Don't ignore new symptoms, such as fever, nausea and vomiting, urination problems, pain that gets worse, and dizziness. These may be signs of a more serious problem. Your doctor may have recommended a follow-up visit in the next 8 to 12 hours. If you are not getting better, you may need more tests or treatment. The doctor has checked you carefully, but problems can develop later. If you notice any problems or new symptoms, get medical treatment right away. Follow-up care is a key part of your treatment and safety. Be sure to make and go to all appointments, and call your doctor if you are having problems. It's also a good idea to know your test results and keep a list of the medicines you take. How can you care for yourself at home? · Rest until you feel better. · To prevent dehydration, drink plenty of fluids, enough so that your urine is light yellow or clear like water. Choose water and other caffeine-free clear liquids until you feel better. If you have kidney, heart, or liver disease and have to limit fluids, talk with your doctor before you increase the amount of fluids you drink. · If your stomach is upset, eat mild foods, such as rice, dry toast or crackers, bananas, and applesauce. Try eating several small meals instead of two or three large ones. · Wait until 48 hours after all symptoms have gone away before you have spicy foods, alcohol, and drinks that contain caffeine. · Do not eat foods that are high in fat. · Avoid anti-inflammatory medicines such as aspirin, ibuprofen (Advil, Motrin), and naproxen (Aleve). These can cause stomach upset.  Talk to your doctor if you take daily aspirin for another health problem. When should you call for help? Call 911 anytime you think you may need emergency care. For example, call if:  · You passed out (lost consciousness). · You pass maroon or very bloody stools. · You vomit blood or what looks like coffee grounds. · You have new, severe belly pain. Call your doctor now or seek immediate medical care if:  · Your pain gets worse, especially if it becomes focused in one area of your belly. · You have a new or higher fever. · Your stools are black and look like tar, or they have streaks of blood. · You have unexpected vaginal bleeding. · You have symptoms of a urinary tract infection. These may include:  ¨ Pain when you urinate. ¨ Urinating more often than usual.  ¨ Blood in your urine. · You are dizzy or lightheaded, or you feel like you may faint. Watch closely for changes in your health, and be sure to contact your doctor if:  · You are not getting better after 1 day (24 hours). Where can you learn more? Go to http://jaimieResults Unitedmag[employer].info/. Enter Z579 in the search box to learn more about \"Abdominal Pain: Care Instructions. \"  Current as of: March 20, 2017  Content Version: 11.3  © 5632-9876 [employer]. Care instructions adapted under license by [employer] (which disclaims liability or warranty for this information). If you have questions about a medical condition or this instruction, always ask your healthcare professional. [place] [address] any warranty or liability for your use of this information. High Blood Pressure: Care Instructions  Your Care Instructions  If your blood pressure is usually above 140/90, you have high blood pressure, or hypertension. That means the top number is 140 or higher or the bottom number is 90 or higher, or both.   Despite what a lot of people think, high blood pressure usually doesn't cause headaches or make you feel dizzy or lightheaded. It usually has no symptoms. But it does increase your risk for heart attack, stroke, and kidney or eye damage. The higher your blood pressure, the more your risk increases. Your doctor will give you a goal for your blood pressure. Your goal will be based on your health and your age. An example of a goal is to keep your blood pressure below 140/90. Lifestyle changes, such as eating healthy and being active, are always important to help lower blood pressure. You might also take medicine to reach your blood pressure goal.  Follow-up care is a key part of your treatment and safety. Be sure to make and go to all appointments, and call your doctor if you are having problems. It's also a good idea to know your test results and keep a list of the medicines you take. How can you care for yourself at home? Medical treatment  · If you stop taking your medicine, your blood pressure will go back up. You may take one or more types of medicine to lower your blood pressure. Be safe with medicines. Take your medicine exactly as prescribed. Call your doctor if you think you are having a problem with your medicine. · Talk to your doctor before you start taking aspirin every day. Aspirin can help certain people lower their risk of a heart attack or stroke. But taking aspirin isn't right for everyone, because it can cause serious bleeding. · See your doctor regularly. You may need to see the doctor more often at first or until your blood pressure comes down. · If you are taking blood pressure medicine, talk to your doctor before you take decongestants or anti-inflammatory medicine, such as ibuprofen. Some of these medicines can raise blood pressure. · Learn how to check your blood pressure at home. Lifestyle changes  · Stay at a healthy weight. This is especially important if you put on weight around the waist. Losing even 10 pounds can help you lower your blood pressure.   · If your doctor recommends it, get more exercise. Walking is a good choice. Bit by bit, increase the amount you walk every day. Try for at least 30 minutes on most days of the week. You also may want to swim, bike, or do other activities. · Avoid or limit alcohol. Talk to your doctor about whether you can drink any alcohol. · Try to limit how much sodium you eat to less than 2,300 milligrams (mg) a day. Your doctor may ask you to try to eat less than 1,500 mg a day. · Eat plenty of fruits (such as bananas and oranges), vegetables, legumes, whole grains, and low-fat dairy products. · Lower the amount of saturated fat in your diet. Saturated fat is found in animal products such as milk, cheese, and meat. Limiting these foods may help you lose weight and also lower your risk for heart disease. · Do not smoke. Smoking increases your risk for heart attack and stroke. If you need help quitting, talk to your doctor about stop-smoking programs and medicines. These can increase your chances of quitting for good. When should you call for help? Call 911 anytime you think you may need emergency care. This may mean having symptoms that suggest that your blood pressure is causing a serious heart or blood vessel problem. Your blood pressure may be over 180/110. For example, call 911 if:  · You have symptoms of a heart attack. These may include:  ¨ Chest pain or pressure, or a strange feeling in the chest.  ¨ Sweating. ¨ Shortness of breath. ¨ Nausea or vomiting. ¨ Pain, pressure, or a strange feeling in the back, neck, jaw, or upper belly or in one or both shoulders or arms. ¨ Lightheadedness or sudden weakness. ¨ A fast or irregular heartbeat. · You have symptoms of a stroke. These may include:  ¨ Sudden numbness, tingling, weakness, or loss of movement in your face, arm, or leg, especially on only one side of your body. ¨ Sudden vision changes. ¨ Sudden trouble speaking. ¨ Sudden confusion or trouble understanding simple statements.   ¨ Sudden problems with walking or balance. ¨ A sudden, severe headache that is different from past headaches. · You have severe back or belly pain. Do not wait until your blood pressure comes down on its own. Get help right away. Call your doctor now or seek immediate care if:  · Your blood pressure is much higher than normal (such as 180/110 or higher), but you don't have symptoms. · You think high blood pressure is causing symptoms, such as:  ¨ Severe headache. ¨ Blurry vision. Watch closely for changes in your health, and be sure to contact your doctor if:  · Your blood pressure measures 140/90 or higher at least 2 times. That means the top number is 140 or higher or the bottom number is 90 or higher, or both. · You think you may be having side effects from your blood pressure medicine. · Your blood pressure is usually normal, but it goes above normal at least 2 times. Where can you learn more? Go to http://jaimie-magdy.info/. Enter A778 in the search box to learn more about \"High Blood Pressure: Care Instructions. \"  Current as of: August 8, 2016  Content Version: 11.3  © 8070-7642 [employer]. Care instructions adapted under license by [employer] (which disclaims liability or warranty for this information). If you have questions about a medical condition or this instruction, always ask your healthcare professional. [place] [address] any warranty or liability for your use of this information.

## 2017-09-05 NOTE — UC PROVIDER NOTE
Patient is a 40 y.o. female presenting with diarrhea. The history is provided by the patient. Diarrhea    This is a new problem. The current episode started yesterday. The problem occurs constantly. The problem has been gradually improving. Pain location: generalized lower abdomainal pain with cramping and loose, watery nonbloody diarrhea. The quality of the pain is cramping. The pain is at a severity of 7/10. Associated symptoms include anorexia, diarrhea and flatus. Pertinent negatives include no fever, no hematochezia, no melena, no nausea, no vomiting, no constipation, no dysuria, no frequency, no trauma and no back pain. The pain is worsened by eating. The pain is relieved by nothing (getting some better as the day goes on). Past medical history comments: \"ulcerative colitis\" but not had a colonoscopy, HTN. Past Medical History:   Diagnosis Date    Allergy     Anxiety     Asthma     Congenital heart disease         Past Surgical History:   Procedure Laterality Date    HX DILATION AND CURETTAGE      endometriosis and precervical ca    HX HEENT      tonsillectomy,DNS sx    HX ORTHOPAEDIC      right tibia fx,no sx needed         Family History   Problem Relation Age of Onset    Hypertension Mother         Social History     Social History    Marital status:      Spouse name: N/A    Number of children: N/A    Years of education: N/A     Occupational History    Not on file. Social History Main Topics    Smoking status: Light Tobacco Smoker     Packs/day: 0.25     Years: 2.00     Types: Cigarettes    Smokeless tobacco: Never Used    Alcohol use 0.0 oz/week     0 Standard drinks or equivalent per week      Comment: socially    Drug use: No    Sexual activity: Not Currently     Birth control/ protection: None      Comment: ,works in [employer]     Other Topics Concern    Not on file     Social History Narrative                ALLERGIES: Codeine;  Hydrocodone; Pcn [penicillins]; and Zithromax [azithromycin]    Review of Systems   Constitutional: Positive for fatigue. Negative for fever. Gastrointestinal: Positive for abdominal pain, anorexia, diarrhea and flatus. Negative for abdominal distention, blood in stool, constipation, hematochezia, melena, nausea and vomiting. Genitourinary: Negative. Negative for dysuria and frequency. Musculoskeletal: Negative for back pain. Vitals:    09/05/17 1630   BP: (!) 179/113   Pulse: 63   Resp: 16   Temp: 98.2 °F (36.8 °C)   SpO2: 95%   Weight: 102.5 kg (226 lb)   Height: 5' 5\" (1.651 m)       Physical Exam   Constitutional: She is oriented to person, place, and time. She appears well-developed and well-nourished. Uncomfortable but nontoxic   HENT:   Head: Normocephalic. Mouth/Throat: Oropharynx is clear and moist. No oropharyngeal exudate. Eyes: Conjunctivae are normal.   Neck: Normal range of motion. Cardiovascular: Normal rate, regular rhythm and normal heart sounds. No murmur heard. Pulmonary/Chest: Effort normal and breath sounds normal. No respiratory distress. She has no wheezes. She has no rales. She exhibits no tenderness. Abdominal: Soft. She exhibits no distension. There is tenderness. There is guarding (lower abdomen without masees, hypeoractive bowel sounds). There is no rebound. Musculoskeletal: Normal range of motion. She exhibits no edema or tenderness. Neurological: She is alert and oriented to person, place, and time. Skin: Skin is warm. She is not diaphoretic. No erythema. Psychiatric: She has a normal mood and affect. Her behavior is normal.   Nursing note and vitals reviewed. MDM     Differential Diagnosis; Clinical Impression; Plan:     CLINICAL IMPRESSION:  Abdominal pain, generalized  (primary encounter diagnosis)  Essential hypertension  Diarrhea, unspecified type    Plan:  1. Take your home BP medication  2. ED now for further evaluation  3.  Pregnancy test offered and pt declined      Risk of Significant Complications, Morbidity, and/or Mortality:   Presenting problems: Moderate  Management options:   Moderate  Progress:   Patient progress:  Stable      Procedures

## 2017-09-05 NOTE — LETTER
NOTIFICATION RETURN TO WORK / SCHOOL 
 
9/5/2017 5:09 PM 
 
Ms. Srinivas ELENA. [phone] To Whom It May Concern: 
 
Srinivas Ayala is currently under the care of [address] [place] [address]. She will return to work/school on: 9/6/17 If there are questions or concerns please have the patient contact our office. Sincerely, Catherine Call.  DO

## 2017-09-12 ENCOUNTER — TELEPHONE (OUTPATIENT)
Dept: INTERNAL MEDICINE CLINIC | Age: 37
End: 2017-09-12

## 2017-09-12 DIAGNOSIS — R87.629 ABNORMAL PAP SMEAR OF VAGINA: Primary | ICD-10-CM

## 2017-09-12 NOTE — TELEPHONE ENCOUNTER
Patient going to see OB/GYN on Thurs, 9/14/17. Needs a referral generated, then an insurance referral.  She's going to see Dr. Gail Galeazzi at [facility] on [address], phone number there is [phone]. Please call patient if there is an issue with this. Please also fax her last pap smear results also. Fax number is [phone].

## 2017-09-30 ENCOUNTER — HOSPITAL ENCOUNTER (EMERGENCY)
Age: 37
Discharge: HOME | End: 2017-09-30
Attending: FAMILY MEDICINE

## 2017-09-30 ENCOUNTER — HOSPITAL ENCOUNTER (OUTPATIENT)
Dept: LAB | Age: 37
Discharge: HOME | End: 2017-09-30

## 2017-09-30 VITALS
HEIGHT: 66 IN | BODY MASS INDEX: 36.32 KG/M2 | DIASTOLIC BLOOD PRESSURE: 96 MMHG | HEART RATE: 68 BPM | TEMPERATURE: 97.8 F | RESPIRATION RATE: 18 BRPM | WEIGHT: 226 LBS | SYSTOLIC BLOOD PRESSURE: 153 MMHG | OXYGEN SATURATION: 97 %

## 2017-09-30 DIAGNOSIS — N39.0 ACUTE UTI: Primary | ICD-10-CM

## 2017-09-30 LAB
BILIRUB UR QL: ABNORMAL
GLUCOSE UR QL STRIP.AUTO: 250 MG/DL
HCG UR QL: NEGATIVE
KETONES UR-MCNC: 15 MG/DL
LEUKOCYTE ESTERASE UR QL STRIP: ABNORMAL
NITRITE UR QL: POSITIVE
PH UR: 6 [PH] (ref 5–8)
PROT UR QL: 300 MG/DL
RBC # UR STRIP: ABNORMAL /UL
SP GR UR: 1.02 (ref 1–1.03)
UROBILINOGEN UR QL: 4 EU/DL (ref 0.2–1)

## 2017-09-30 PROCEDURE — 87077 CULTURE AEROBIC IDENTIFY: CPT | Performed by: NURSE PRACTITIONER

## 2017-09-30 PROCEDURE — 87086 URINE CULTURE/COLONY COUNT: CPT | Performed by: NURSE PRACTITIONER

## 2017-09-30 RX ORDER — NITROFURANTOIN 25; 75 MG/1; MG/1
100 CAPSULE ORAL 2 TIMES DAILY
Qty: 10 CAP | Refills: 0 | Status: SHIPPED | OUTPATIENT
Start: 2017-09-30 | End: 2017-10-05

## 2017-09-30 NOTE — LETTER
NOTIFICATION RETURN TO WORK / SCHOOL 
 
9/30/2017 2:07 PM 
 
Ms. Divya Garrett [facility]. [phone] To Whom It May Concern: 
 
Divya Garrett is currently under the care of [address] [place] [address]. She will return to work/school on: 10/01/2017 If there are questions or concerns please have the patient contact our office. Sincerely, Gina Ellis NP

## 2017-09-30 NOTE — UC PROVIDER NOTE
HPI Comments:   2 day history of increased urgency, frequency and burning with urination. Tried OTC AZO without relief. Overall worsening. Denies: vaginal discharge, blood in urine, flank pain, n/v, fever or chills    Patient is a 40 y.o. female presenting with urinary tract infection. Bladder Infection           Past Medical History:   Diagnosis Date    Allergy     Anxiety     Asthma     Congenital heart disease         Past Surgical History:   Procedure Laterality Date    HX DILATION AND CURETTAGE      endometriosis and precervical ca    HX HEENT      tonsillectomy,DNS sx    HX ORTHOPAEDIC      right tibia fx,no sx needed         Family History   Problem Relation Age of Onset    Hypertension Mother         Social History     Social History    Marital status:      Spouse name: N/A    Number of children: N/A    Years of education: N/A     Occupational History    Not on file. Social History Main Topics    Smoking status: Light Tobacco Smoker     Packs/day: 0.25     Years: 2.00     Types: Cigarettes    Smokeless tobacco: Never Used    Alcohol use 0.0 oz/week     0 Standard drinks or equivalent per week      Comment: socially    Drug use: No    Sexual activity: Not Currently     Birth control/ protection: None      Comment: ,works in [employer]     Other Topics Concern    Not on file     Social History Narrative                ALLERGIES: Codeine; Hydrocodone; Pcn [penicillins]; and Zithromax [azithromycin]    Review of Systems   All other systems reviewed and are negative. Vitals:    09/30/17 1321   BP: (!) 153/96   Pulse: 68   Resp: 18   Temp: 97.8 °F (36.6 °C)   SpO2: 97%   Weight: 102.5 kg (226 lb)   Height: 5' 5.5\" (1.664 m)       Physical Exam   Constitutional: She is oriented to person, place, and time. She appears well-developed and well-nourished. No distress.    Non toxic appearing   Cardiovascular: Normal rate, regular rhythm, normal heart sounds and intact distal pulses. Exam reveals no gallop and no friction rub. No murmur heard. Pulmonary/Chest: Effort normal and breath sounds normal. No respiratory distress. She has no wheezes. She has no rales. She exhibits no tenderness. Abdominal: Soft. She exhibits no distension. There is no guarding. Mild suprapubic tenderness to palpation. All other quadrants non tender. No rebound tenderness. Neurological: She is alert and oriented to person, place, and time. Skin: Skin is warm and dry. She is not diaphoretic. Psychiatric: She has a normal mood and affect. Her behavior is normal. Judgment and thought content normal.       MDM     Differential Diagnosis; Clinical Impression; Plan:       CLINICAL IMPRESSION:  (N39.0) Acute UTI  (primary encounter diagnosis)    Orders Placed This Encounter      CULTURE, URINE      POC URINE DIPSTICK      POC URINE PREGNANCY TEST    RX:      nitrofurantoin, macrocrystal-monohydrate, (MACROBID) 100 mg capsule    Plan:  1. See above orders. 2. Maintain adequate fluid intake. 3. Follow up with PCP    Risk of Significant Complications, Morbidity, and/or Mortality:   Presenting problems: Moderate  Diagnostic procedures: Moderate  Management options:   Moderate  Progress:   Patient progress:  Stable      Procedures

## 2017-09-30 NOTE — DISCHARGE INSTRUCTIONS
Urinary Tract Infection in Pregnancy: Care Instructions  Your Care Instructions    A urinary tract infection, or UTI, is an infection of the bladder and other urinary structures. Most UTIs occur in the bladder. They often cause pain or burning when you urinate. UTI is the most common bacterial infection in pregnancy. If untreated, a UTI could lead to problems such as a kidney infection or  labor. Most UTIs can be cured with antibiotics. Your doctor will prescribe an antibiotic that is safe during pregnancy. Be sure to finish your medicine so that the infection does not spread to your kidneys. Follow-up care is a key part of your treatment and safety. Be sure to make and go to all appointments, and call your doctor if you are having problems. It's also a good idea to know your test results and keep a list of the medicines you take. How can you care for yourself at home? · Take your antibiotics as directed. Do not stop taking them just because you feel better. You need to take the full course of antibiotics. · Drink extra water and other fluids for the next day or two. This will help wash out the bacteria causing the infection. If you have kidney, heart, or liver disease and have to limit fluids, talk with your doctor before you increase the amount of fluids you drink. · Do not drink alcohol. · Urinate often. Try to empty your bladder each time. Preventing UTIs  · Drink plenty of fluids, enough so that your urine is light yellow or clear like water. This helps you urinate often, which clears bacteria from your system. If you have kidney, heart, or liver disease and have to limit fluids, talk with your doctor before you increase the amount of fluids you drink. · Urinate when you first have the urge. · Urinate right after you have sex. This is the best way for women to avoid UTIs. · When going to the bathroom, wipe from front to back to keep bacteria from entering the vagina or urethra.   When should you call for help? Call your doctor now or seek immediate medical care if:  · Symptoms such as fever, chills, nausea, or vomiting get worse or appear for the first time. · You have new pain in your back just below your rib cage. This is called flank pain. · There is new blood or pus in your urine. · You have any problems with your antibiotic medicine. Watch closely for changes in your health, and be sure to contact your doctor if:  · You are not getting better after 1 day (24 hours). · You have new symptoms, such as blood in your urine. Where can you learn more? Go to http://jaimie-magdy.info/. Enter M982 in the search box to learn more about \"Urinary Tract Infection in Pregnancy: Care Instructions. \"  Current as of: March 16, 2017  Content Version: 11.3  © 6956-3974 [employer]. Care instructions adapted under license by [employer] (which disclaims liability or warranty for this information). If you have questions about a medical condition or this instruction, always ask your healthcare professional. [address] any warranty or liability for your use of this information.

## 2017-10-02 LAB
BACTERIA SPEC CULT: ABNORMAL
BACTERIA SPEC CULT: ABNORMAL
CC UR VC: ABNORMAL
SERVICE CMNT-IMP: ABNORMAL

## 2017-12-27 ENCOUNTER — HOSPITAL ENCOUNTER (EMERGENCY)
Age: 37
Discharge: HOME | End: 2017-12-27
Attending: FAMILY MEDICINE

## 2017-12-27 VITALS
HEIGHT: 66 IN | BODY MASS INDEX: 36.32 KG/M2 | RESPIRATION RATE: 18 BRPM | SYSTOLIC BLOOD PRESSURE: 142 MMHG | TEMPERATURE: 98.5 F | OXYGEN SATURATION: 98 % | HEART RATE: 74 BPM | WEIGHT: 226 LBS | DIASTOLIC BLOOD PRESSURE: 90 MMHG

## 2017-12-27 DIAGNOSIS — J06.9 ACUTE UPPER RESPIRATORY INFECTION: Primary | ICD-10-CM

## 2017-12-27 RX ORDER — BENZONATATE 100 MG/1
100 CAPSULE ORAL
COMMUNITY
End: 2019-02-11

## 2017-12-27 RX ORDER — AMOXICILLIN 875 MG/1
875 TABLET, FILM COATED ORAL 2 TIMES DAILY
COMMUNITY
End: 2019-02-11

## 2017-12-27 NOTE — LETTER
[facility] 
[address] [address] 
[phone] Work/School Note Date: 12/27/2017 To Whom It May concern: 
 
Chloe Melendez was seen and treated today in the emergency room by the following provider(s): 
Attending Provider: Linwood Paniagua MD 
Physician Assistant: Moraima Mcguire. Chloe Melendez may return to work on 12/30/17. Sincerely, Moraima Mcguire

## 2017-12-27 NOTE — UC PROVIDER NOTE
Patient is a 40 y.o. female presenting with cold symptoms. The history is provided by the patient. Cold Symptoms    This is a new problem. The current episode started more than 2 days ago. There has been no fever. Associated symptoms include congestion and cough. Pertinent negatives include no chest pain, no abdominal pain and no vomiting. Past Medical History:   Diagnosis Date    Allergy     Anxiety     Asthma     Congenital heart disease         Past Surgical History:   Procedure Laterality Date    HX DILATION AND CURETTAGE      endometriosis and precervical ca    HX HEENT      tonsillectomy,DNS sx    HX ORTHOPAEDIC      right tibia fx,no sx needed         Family History   Problem Relation Age of Onset    Hypertension Mother         Social History     Social History    Marital status:      Spouse name: N/A    Number of children: N/A    Years of education: N/A     Occupational History    Not on file. Social History Main Topics    Smoking status: Light Tobacco Smoker     Packs/day: 0.25     Years: 2.00     Types: Cigarettes    Smokeless tobacco: Never Used    Alcohol use 0.0 oz/week     0 Standard drinks or equivalent per week      Comment: socially    Drug use: No    Sexual activity: Not Currently     Birth control/ protection: None      Comment: ,works in [employer]     Other Topics Concern    Not on file     Social History Narrative                ALLERGIES: Codeine; Hydrocodone; Pcn [penicillins]; and Zithromax [azithromycin]    Review of Systems   Constitutional: Negative for fever. HENT: Positive for congestion. Respiratory: Positive for cough. Cardiovascular: Negative for chest pain. Gastrointestinal: Negative for abdominal pain and vomiting.        Vitals:    12/27/17 0923   BP: 142/90   Pulse: 74   Resp: 18   Temp: 98.5 °F (36.9 °C)   SpO2: 98%   Weight: 102.5 kg (226 lb)   Height: 5' 5.5\" (1.664 m)       Physical Exam   Constitutional: She is oriented to person, place, and time. She appears well-developed and well-nourished. HENT:   Right Ear: External ear normal.   Left Ear: External ear normal.   Eyes: Conjunctivae and EOM are normal.   Cardiovascular: Normal rate and regular rhythm. Pulmonary/Chest: Effort normal and breath sounds normal.   Neurological: She is alert and oriented to person, place, and time. Skin: Skin is warm and dry. Psychiatric: She has a normal mood and affect. Her behavior is normal. Judgment and thought content normal.   Nursing note and vitals reviewed. MDM     Differential Diagnosis; Clinical Impression; Plan:     CLINICAL IMPRESSION:  Acute upper respiratory infection  (primary encounter diagnosis)    Plan:  1. Continue tessalon   2. Continue Amoxil as prescribed  3. Risk of Significant Complications, Morbidity, and/or Mortality:   Presenting problems: Moderate  Diagnostic procedures: Moderate  Management options:   Moderate  Progress:   Patient progress:  Stable      Procedures

## 2018-03-04 ENCOUNTER — HOSPITAL ENCOUNTER (EMERGENCY)
Age: 38
Discharge: STILL ADMITTED OR ADMINISTRATIVE | End: 2018-03-04
Attending: FAMILY MEDICINE

## 2018-07-18 RX ORDER — METOPROLOL TARTRATE 50 MG/1
TABLET ORAL
Qty: 60 TAB | Refills: 11 | Status: SHIPPED | OUTPATIENT
Start: 2018-07-18 | End: 2019-07-26

## 2019-02-11 ENCOUNTER — OFFICE VISIT (OUTPATIENT)
Dept: URGENT CARE | Age: 39
End: 2019-02-11

## 2019-02-11 VITALS
RESPIRATION RATE: 16 BRPM | WEIGHT: 235 LBS | HEART RATE: 86 BPM | SYSTOLIC BLOOD PRESSURE: 136 MMHG | OXYGEN SATURATION: 97 % | TEMPERATURE: 98 F | HEIGHT: 65 IN | BODY MASS INDEX: 39.15 KG/M2 | DIASTOLIC BLOOD PRESSURE: 85 MMHG

## 2019-02-11 DIAGNOSIS — H57.89 IRRITATION OF LEFT EYE: Primary | ICD-10-CM

## 2019-02-11 DIAGNOSIS — T20.10XA SUPERFICIAL BURN OF FACE, INITIAL ENCOUNTER: ICD-10-CM

## 2019-02-11 PROBLEM — E66.01 SEVERE OBESITY (HCC): Status: ACTIVE | Noted: 2019-02-11

## 2019-02-11 RX ORDER — NEOMYCIN SULFATE, POLYMYXIN B SULFATE, BACITRACIN ZINC, HYDROCORTISONE 3.5; 10000; 400; 1 MG/G; [USP'U]/G; [USP'U]/G; MG/G
OINTMENT OPHTHALMIC
Qty: 1 TUBE | Refills: 0 | Status: SHIPPED | OUTPATIENT
Start: 2019-02-11 | End: 2020-10-22

## 2019-02-11 NOTE — PROGRESS NOTES
Eye Pain   This is a new (left eye ) problem. The current episode started 2 days ago (saturday get burn on left upper face with greese - including eyelids). The problem occurs daily. Associated symptoms comments: Left eye irritation- mild  No drainage  She washed eye with cold water immediately  No oil went inside eye . She has tried nothing for the symptoms. Past Medical History:   Diagnosis Date    Allergy     Anxiety     Asthma     Congenital heart disease         Past Surgical History:   Procedure Laterality Date    HX DILATION AND CURETTAGE      endometriosis and precervical ca    HX HEENT      tonsillectomy,DNS sx    HX ORTHOPAEDIC      right tibia fx,no sx needed         Family History   Problem Relation Age of Onset    Hypertension Mother         Social History     Socioeconomic History    Marital status:      Spouse name: Not on file    Number of children: Not on file    Years of education: Not on file    Highest education level: Not on file   Social Needs    Financial resource strain: Not on file    Food insecurity - worry: Not on file    Food insecurity - inability: Not on file   [employer] needs - medical: Not on file   [employer] needs - non-medical: Not on file   Occupational History    Not on file   Tobacco Use    Smoking status: Light Tobacco Smoker     Packs/day: 0.25     Years: 2.00     Pack years: 0.50     Types: Cigarettes    Smokeless tobacco: Never Used   Substance and Sexual Activity    Alcohol use: Yes     Alcohol/week: 0.0 oz     Comment: socially    Drug use: No    Sexual activity: Not Currently     Birth control/protection: None     Comment: ,works in [employer]   Other Topics Concern    Not on file   Social History Narrative    Not on file                ALLERGIES: Codeine; Hydrocodone; Pcn [penicillins]; and Zithromax [azithromycin]    Review of Systems   Eyes: Positive for pain (mild scratchy sensation in left eye).  Negative for photophobia, discharge, itching and visual disturbance. Skin:        Burn on left upper face- periorbital and forehead    All other systems reviewed and are negative. Vitals:    02/11/19 1000   BP: 136/85   Pulse: 86   Resp: 16   Temp: 98 °F (36.7 °C)   SpO2: 97%   Weight: 235 lb (106.6 kg)   Height: 5' 5\" (1.651 m)       Physical Exam    MDM    Procedures      ICD-10-CM ICD-9-CM    1. Irritation of left eye H57.89 379.99    2. Superficial burn of face, initial encounter T20.10XA 941.10      Medications Ordered Today   Medications    kxpy-gexkmql-ceb-hydrocort (CORTISPORIN) 3.5-400-10,000 mg-unit/g-1% ophthalmic ointment     Sig: Apply thin laye inside and on upper eyelid 3 times/ day     Dispense:  1 Tube     Refill:  0     No results found for any visits on 02/11/19. The patients condition was discussed with the patient and they understand. The patient is to follow up with primary care doctor. If signs and symptoms become worse the pt is to go to the ER. The patient is to take medications as prescribed.

## 2019-07-26 ENCOUNTER — OFFICE VISIT (OUTPATIENT)
Dept: CARDIOLOGY CLINIC | Age: 39
End: 2019-07-26

## 2019-07-26 VITALS
RESPIRATION RATE: 16 BRPM | HEART RATE: 81 BPM | WEIGHT: 242 LBS | HEIGHT: 65 IN | OXYGEN SATURATION: 96 % | DIASTOLIC BLOOD PRESSURE: 110 MMHG | BODY MASS INDEX: 40.32 KG/M2 | SYSTOLIC BLOOD PRESSURE: 156 MMHG

## 2019-07-26 DIAGNOSIS — Q21.10 RESIDUAL ASD (ATRIAL SEPTAL DEFECT) FOLLOWING REPAIR: ICD-10-CM

## 2019-07-26 DIAGNOSIS — Z72.0 TOBACCO USE: ICD-10-CM

## 2019-07-26 DIAGNOSIS — Q25.1 AORTA COARCTATION: ICD-10-CM

## 2019-07-26 DIAGNOSIS — Q26.1 PERSISTENT LEFT SVC (SUPERIOR VENA CAVA): ICD-10-CM

## 2019-07-26 DIAGNOSIS — I47.1 PAROXYSMAL SVT (SUPRAVENTRICULAR TACHYCARDIA) (HCC): Primary | ICD-10-CM

## 2019-07-26 RX ORDER — METOPROLOL TARTRATE 100 MG/1
TABLET ORAL
Qty: 180 TAB | Refills: 1 | Status: SHIPPED | OUTPATIENT
Start: 2019-07-26 | End: 2020-02-03

## 2019-07-26 NOTE — PROGRESS NOTES
[facility]: [place]  [phone]) [phone]    HPI:  Ms. Raina Ng is a 44 yo F with h/o pre-hypertension, congential heart disease (surgery at age 11 at [facility]- \"hole\" on the outside), asthma and KAYLI initially seen for frequent and bothersome palpitations. Event monitor demonstrated episodes of SVT with HR > 150 bpm and replaced hyzaar with metoprolol. 8/2014 echo was normal.  Cardiac MRI 10/2016 demonstrated that the previously placed ASD patch with no residual shunting; there was a persistent left SVC between the left atrial appendage and the left superior pulmonary vein to join the coronary sinus. There was also a mild coarctation of the aorta between the ascending and aortic arch in the proximal descending thoracic aorta. The coarctation measured 14 mm in diameter; distal to the coarctation post dilation at 28 mm and paroximally 29 mm. The ascending aorta was dilated at 43 x 38 mm. At last visit due to MRI noting aortic coarctation, she was suppose to have a CT scan for surveillance, but she did not follow up for insurance reasons. From a symptom standpoint, she does continue to have episodes of palpitations and has known SVT. This can happen inconsistently. She last had a severe spell four months ago where she felt presyncope. About six months ago, she had a two hour spell where she was presyncopal and felt diaphoretic and hot while she was having palpitations and then cool afterwards. She says she has been compliant with her medications. She otherwise denies any chest pain. She does get occasional shortness of breath, but she attributes this to weight gain. She does continue to smoke, one pack lasts ten days. She says she only smokes with alcohol use. She is compensated on exam with clear lungs and trace lower extremity edema. Her blood pressure is elevated at 140/90 on repeat today. Assessment and Plan:    1. Paroxysmal supraventricular tachycardia.  Will increase her Metoprolol for her SVT, as well as blood pressure. Will have her follow back in one month. I do think that she might need an ablation for this, as she is having presyncope and severe breakthrough episodes. Talked briefly about this. 2. Aortic coarctation. This was noted on MRI in 2016. Will get blood work and obtain a CT scan of the chest and abdomen with and without contrast.  Will also obtain an echocardiogram for further evaluation and to obtain a gradient. 3. ASD. Status post repair and this had a normal imaging with patch with her cardiac MRI. 4. Persistent SVC. 5. Tobacco use. Encouraged cessation. She  has a past medical history of Allergy, Anxiety, Asthma, and Congenital heart disease. She also has no past medical history of Myocardial infarction [facility]) or [facility] All other systems negative except as above. PE  Vitals:    07/26/19 1102 07/26/19 1141   BP: (!) 160/120 (!) 156/110   Pulse: 81    Resp: 16    SpO2: 96%    Weight: 242 lb (109.8 kg)    Height: 5' 5\" (1.651 m)     Body mass index is 40.27 kg/m².    General appearance - alert, well appearing, and in no distress  Mental status - affect appropriate to mood  Eyes - sclera anicteric, moist mucous membranes  Neck - supple, no significant adenopathy, no bruits, no JVD  Chest - clear to auscultation, no wheezes, rales or rhonchi  Heart - normal rate, regular rhythm, normal S1, S2, I/VI systolic murmur LUSB  Abdomen - soft, nontender, nondistended, no masses or organomegaly  Extremities - peripheral pulses normal, no pedal edema      Recent Labs:  Lab Results   Component Value Date/Time    Cholesterol, total 193 04/03/2017 10:16 AM    HDL Cholesterol 61 04/03/2017 10:16 AM    LDL, calculated 112 (H) 04/03/2017 10:16 AM    Triglyceride 102 04/03/2017 10:16 AM     Lab Results   Component Value Date/Time    Creatinine 0.63 04/03/2017 10:16 AM     Lab Results   Component Value Date/Time    BUN 10 04/03/2017 10:16 AM     Lab Results Component Value Date/Time    Potassium 4.3 04/03/2017 10:16 AM     No results found for: HBA1C, [id number]  Lab Results   Component Value Date/Time    HGB 15.1 04/03/2017 10:16 AM     Lab Results   Component Value Date/Time    PLATELET 054 62/45/3012 10:16 AM       Reviewed:  Past Medical History:   Diagnosis Date    Allergy     Anxiety     Asthma     Congenital heart disease      Social History     Tobacco Use   Smoking Status Light Tobacco Smoker    Packs/day: 0.25    Years: 2.00    Pack years: 0.50    Types: Cigarettes   Smokeless Tobacco Never Used     Social History     Substance and Sexual Activity   Alcohol Use Yes    Alcohol/week: 0.0 standard drinks    Comment: socially     Allergies   Allergen Reactions    Codeine Unknown (comments)    Hydrocodone Other (comments)    Pcn [Penicillins] Unknown (comments)    Zithromax [Azithromycin] Rash       Current Outpatient Medications   Medication Sig    metoprolol tartrate (LOPRESSOR) 50 mg tablet TAKE ONE TABLET BY MOUTH TWICE DAILY    albuterol (PROVENTIL HFA, VENTOLIN HFA, PROAIR HFA) 90 mcg/actuation inhaler Take 1 Puff by inhalation every four (4) hours as needed for Wheezing.  MULTIVITS,CA,MINERALS/IRON/FA (ONE-A-DAY WOMENS FORMULA PO) Take  by mouth.  Omega-3-DHA-EPA-Fish Oil 1,000 (120-180) mg cap Take 1,000 mg by mouth daily.  kfmo-fmwmnuy-vwg-hydrocort (CORTISPORIN) 3.5-400-10,000 mg-unit/g-1% ophthalmic ointment Apply thin laye inside and on upper eyelid 3 times/ day     No current facility-administered medications for this visit.         Britany Kat MD  [facility] heart and Vascular [place]  [address] [facility] [address]  [address] [facility], [address] [place] [address]

## 2019-07-26 NOTE — PROGRESS NOTES
Identified pt with two pt identifiers(name and ). Reviewed record in preparation for visit and have obtained necessary documentation. Chief Complaint   Patient presents with    SVT     f/u    Palpitations     pt reports experiencing palpitations last night for the first time in several months    Hypertension     pt states she has not yet eaten or taken her BP meds today; pt given water and crackers and advised to take meds now          Coordination of Care Questionnaire:  :   1) Have you been to an emergency room, urgent care, or hospitalized since your last visit? If yes, where when, and reason for visit? no     2. Have seen or consulted any other health care provider since your last visit? If yes, where when, and reason for visit? NO    Patient is accompanied by self I have received verbal consent from Sterling Olea to discuss any/all medical information while they are present in the room.

## 2019-07-29 ENCOUNTER — TELEPHONE (OUTPATIENT)
Dept: CARDIOLOGY CLINIC | Age: 39
End: 2019-07-29

## 2019-07-29 DIAGNOSIS — Q21.10 ASD (ATRIAL SEPTAL DEFECT): Primary | ICD-10-CM

## 2019-07-29 NOTE — TELEPHONE ENCOUNTER
Sent email for ALESSANDRO to schedule CT chest w cont and Abd w wo cont. Order needs to be split.  Will check with MD.

## 2020-02-03 RX ORDER — METOPROLOL TARTRATE 100 MG/1
TABLET ORAL
Qty: 60 TAB | Refills: 0 | OUTPATIENT
Start: 2020-02-03

## 2020-02-03 RX ORDER — METOPROLOL TARTRATE 100 MG/1
TABLET ORAL
Qty: 60 TAB | Refills: 0 | Status: SHIPPED | OUTPATIENT
Start: 2020-02-03 | End: 2020-03-03

## 2020-02-03 NOTE — TELEPHONE ENCOUNTER
Requested Prescriptions     Signed Prescriptions Disp Refills    metoprolol tartrate (LOPRESSOR) 100 mg IR tablet 60 Tab 0     Sig: TAKE ONE TABLET BY MOUTH TWICE A DAY     Authorizing Provider: Maia Delarosa     Ordering User: Ivory Beverly       Last office visit 7/26/19    Per Dr. Carlos Parisi     No future appointments. **No more refills till follow up appointment.

## 2020-03-03 RX ORDER — METOPROLOL TARTRATE 100 MG/1
TABLET ORAL
Qty: 60 TAB | Refills: 0 | Status: SHIPPED | OUTPATIENT
Start: 2020-03-03 | End: 2020-04-02 | Stop reason: SDUPTHER

## 2020-03-03 NOTE — TELEPHONE ENCOUNTER
Requested Prescriptions     Signed Prescriptions Disp Refills    metoprolol tartrate (LOPRESSOR) 100 mg IR tablet 60 Tab 0     Sig: TAKE ONE TABLET BY MOUTH TWICE A DAY **MUST CALL MD FOR APPOINTMENT     Authorizing Provider: Monet Gómez     Ordering User: Renetta Andre       Last office visit 7/26/19    Per Dr. Vitaly Hanks   Date Time Provider Bassem Jack   3/20/2020 11:40 AM Gina Dixon  [address]

## 2020-03-17 NOTE — PROGRESS NOTES
[facility]: [place]  ([phone]) [phone]    HPI:  Ms. Lissa Nava is a 45 yo F with h/o pre-hypertension, congential heart disease (surgery at age 11 at [facility]- \"hole\" on the outside), asthma and KAYLI initially seen for frequent and bothersome palpitations. Event monitor demonstrated episodes of SVT with HR > 150 bpm and replaced hyzaar with metoprolol. 8/2014 echo was normal.  Cardiac MRI 10/2016 demonstrated that the previously placed ASD patch with no residual shunting; there was a persistent left SVC between the left atrial appendage and the left superior pulmonary vein to join the coronary sinus. There was also a mild coarctation of the aorta between the ascending and aortic arch in the proximal descending thoracic aorta. The coarctation measured 14 mm in diameter; distal to the coarctation post dilation at 28 mm and proximally 29 mm. The ascending aorta was dilated at 43 x 38 mm. At last visit due to MRI noting aortic coarctation, she was suppose to have a CT scan for surveillance, but she did not follow up for insurance reasons. She notes that 'things got busy' and she never had a chance to get this done. From a symptom standpoint, she has been more short of breath at times, but does not know how much is asthma or something else. She denies any exertional chest pain. She has occasional palpitations, but just one occasion where it lasted five to ten minutes. She decreased tobacco use and one pack lasts about a week. She works at [employer], so she is concerned about a lot of potential exposure to the Coronavirus. She is compensated on exam with clear lungs and trace lower extremity edema. Her blood pressure was elevated at 160/110 with a heart rate of 59. Assessment and Plan:    1. Paroxysmal supraventricular tachycardia. She has only rare palpitations, overall controlled on Metoprolol. Will continue this and have her follow back in six months. 2. Shortness of breath.  Will obtain an echocardiogram for further evaluation. 3. Aortic coarctation. Noted on MRI in 2016. Will obtain a CT scan of the chest and abdomen with and without contrast for further evaluation. By echocardiogram, get an evaluation of +/- gradient. 4. ASD, status post repair. Normal imaging with a patch noted by cardiac MRI in the past.   5. Persistent SVC. 6. Tobacco use. Encouraged cessation. 7. Essential hypertension. Blood pressure is elevated. Will add Losartan 25 mg once daily. She  has a past medical history of Allergy, Anxiety, Asthma, and Congenital heart disease. She also has no past medical history of Myocardial infarction [facility]) or Pulmonary embolism ([facility] [address] All other systems negative except as above. PE  Vitals:    03/20/20 1132   BP: (!) 160/110   Pulse: (!) 59   Resp: 14   SpO2: 95%   Weight: 251 lb (113.9 kg)   Height: 5' 5\" (1.651 m)    Body mass index is 41.77 kg/m².    General appearance - alert, well appearing, and in no distress  Mental status - affect appropriate to mood  Eyes - sclera anicteric, moist mucous membranes  Neck - supple, no significant adenopathy, no bruits, no JVD  Chest - clear to auscultation, no wheezes, rales or rhonchi  Heart - normal rate, regular rhythm, normal S1, S2, I/VI systolic murmur LUSB  Abdomen - soft, nontender, nondistended, no masses or organomegaly  Extremities - peripheral pulses normal, no pedal edema      Recent Labs:  Lab Results   Component Value Date/Time    Cholesterol, total 193 04/03/2017 10:16 AM    HDL Cholesterol 61 04/03/2017 10:16 AM    LDL, calculated 112 (H) 04/03/2017 10:16 AM    Triglyceride 102 04/03/2017 10:16 AM     Lab Results   Component Value Date/Time    Creatinine 0.63 04/03/2017 10:16 AM     Lab Results   Component Value Date/Time    BUN 10 04/03/2017 10:16 AM     Lab Results   Component Value Date/Time    Potassium 4.3 04/03/2017 10:16 AM     No results found for: HBA1C, [id number]  Lab Results   Component Value Date/Time    HGB 15.1 04/03/2017 10:16 AM     Lab Results   Component Value Date/Time    PLATELET 306 20/44/8027 10:16 AM       Reviewed:  Past Medical History:   Diagnosis Date    Allergy     Anxiety     Asthma     Congenital heart disease      Social History     Tobacco Use   Smoking Status Light Tobacco Smoker    Packs/day: 0.25    Years: 2.00    Pack years: 0.50    Types: Cigarettes   Smokeless Tobacco Never Used     Social History     Substance and Sexual Activity   Alcohol Use Yes    Alcohol/week: 0.0 standard drinks    Comment: socially     Allergies   Allergen Reactions    Codeine Unknown (comments)    Hydrocodone Other (comments)    Pcn [Penicillins] Unknown (comments)    Zithromax [Azithromycin] Rash       Current Outpatient Medications   Medication Sig    metoprolol tartrate (LOPRESSOR) 100 mg IR tablet TAKE ONE TABLET BY MOUTH TWICE A DAY **MUST CALL MD FOR APPOINTMENT    albuterol (PROVENTIL HFA, VENTOLIN HFA, PROAIR HFA) 90 mcg/actuation inhaler Take 1 Puff by inhalation every four (4) hours as needed for Wheezing.  MULTIVITS,CA,MINERALS/IRON/FA (ONE-A-DAY WOMENS FORMULA PO) Take  by mouth.  Omega-3-DHA-EPA-Fish Oil 1,000 (120-180) mg cap Take 1,000 mg by mouth daily.  kndm-mochjlt-lgj-hydrocort (CORTISPORIN) 3.5-400-10,000 mg-unit/g-1% ophthalmic ointment Apply thin laye inside and on upper eyelid 3 times/ day     No current facility-administered medications for this visit.         Maday Gordillo MD  [facility] heart and Vascular [place]  [address] [facility] [address]  [facility], [address] [place] [address]

## 2020-03-20 ENCOUNTER — OFFICE VISIT (OUTPATIENT)
Dept: CARDIOLOGY CLINIC | Age: 40
End: 2020-03-20

## 2020-03-20 VITALS
DIASTOLIC BLOOD PRESSURE: 110 MMHG | OXYGEN SATURATION: 95 % | WEIGHT: 251 LBS | HEIGHT: 65 IN | SYSTOLIC BLOOD PRESSURE: 160 MMHG | RESPIRATION RATE: 14 BRPM | BODY MASS INDEX: 41.82 KG/M2 | HEART RATE: 59 BPM

## 2020-03-20 DIAGNOSIS — Q25.1 AORTA COARCTATION: ICD-10-CM

## 2020-03-20 DIAGNOSIS — Q21.10 RESIDUAL ASD (ATRIAL SEPTAL DEFECT) FOLLOWING REPAIR: ICD-10-CM

## 2020-03-20 DIAGNOSIS — I47.1 PAROXYSMAL SVT (SUPRAVENTRICULAR TACHYCARDIA) (HCC): ICD-10-CM

## 2020-03-20 DIAGNOSIS — Q26.1 PERSISTENT LEFT SVC (SUPERIOR VENA CAVA): ICD-10-CM

## 2020-03-20 DIAGNOSIS — R06.02 SHORT OF BREATH ON EXERTION: Primary | ICD-10-CM

## 2020-03-20 DIAGNOSIS — Z72.0 TOBACCO USE: ICD-10-CM

## 2020-03-20 RX ORDER — LOSARTAN POTASSIUM 25 MG/1
25 TABLET ORAL DAILY
Qty: 90 TAB | Refills: 1 | Status: SHIPPED | OUTPATIENT
Start: 2020-03-20 | End: 2020-08-28

## 2020-04-02 RX ORDER — METOPROLOL TARTRATE 100 MG/1
TABLET ORAL
Qty: 60 TAB | Refills: 0 | OUTPATIENT
Start: 2020-04-02

## 2020-04-02 RX ORDER — METOPROLOL TARTRATE 100 MG/1
TABLET ORAL
Qty: 60 TAB | Refills: 5 | Status: SHIPPED | OUTPATIENT
Start: 2020-04-02 | End: 2020-08-28 | Stop reason: SDUPTHER

## 2020-04-02 NOTE — TELEPHONE ENCOUNTER
Requested Prescriptions     Signed Prescriptions Disp Refills    metoprolol tartrate (LOPRESSOR) 100 mg IR tablet 60 Tab 5     Sig: TAKE ONE TABLET BY MOUTH TWICE A DAY     Authorizing Provider: Marylu Alegria     Ordering User: Korina Sunshine       Last office visit 3/20/20    Per Dr. Teto Ambrosio   Date Time Provider Bassem Jack   9/23/2020  1:20 PM Bibi Infante  [address]

## 2020-08-28 RX ORDER — LOSARTAN POTASSIUM 25 MG/1
TABLET ORAL
Qty: 30 TAB | Refills: 5 | Status: SHIPPED | OUTPATIENT
Start: 2020-08-28 | End: 2020-10-22 | Stop reason: ALTCHOICE

## 2020-08-28 RX ORDER — METOPROLOL TARTRATE 100 MG/1
TABLET ORAL
Qty: 60 TAB | Refills: 5 | Status: SHIPPED | OUTPATIENT
Start: 2020-08-28 | End: 2021-03-26

## 2020-08-28 NOTE — TELEPHONE ENCOUNTER
Requested Prescriptions     Signed Prescriptions Disp Refills    losartan (COZAAR) 25 mg tablet 30 Tab 5     Sig: TAKE ONE TABLET BY MOUTH DAILY     Authorizing Provider: Shiv Lopez     Ordering User: Dariela Raymundo    metoprolol tartrate (LOPRESSOR) 100 mg IR tablet 60 Tab 5     Sig: TAKE ONE TABLET BY MOUTH TWICE A DAY     Authorizing Provider: Shiv Lopez     Ordering User: Dariela Raymundo       Last office visit 3/20/2020    Per Dr. Lulú Cerna   Date Time Provider Bassem Marcie   9/23/2020  1:20 PM MD MARIO Page AMB

## 2020-09-23 ENCOUNTER — OFFICE VISIT (OUTPATIENT)
Dept: CARDIOLOGY CLINIC | Age: 40
End: 2020-09-23
Payer: COMMERCIAL

## 2020-09-23 VITALS
WEIGHT: 255 LBS | HEIGHT: 65 IN | RESPIRATION RATE: 16 BRPM | OXYGEN SATURATION: 96 % | SYSTOLIC BLOOD PRESSURE: 120 MMHG | BODY MASS INDEX: 42.49 KG/M2 | DIASTOLIC BLOOD PRESSURE: 80 MMHG | HEART RATE: 58 BPM

## 2020-09-23 DIAGNOSIS — Q21.10 RESIDUAL ASD (ATRIAL SEPTAL DEFECT) FOLLOWING REPAIR: ICD-10-CM

## 2020-09-23 DIAGNOSIS — Q26.1 PERSISTENT LEFT SVC (SUPERIOR VENA CAVA): ICD-10-CM

## 2020-09-23 DIAGNOSIS — Z72.0 TOBACCO USE: ICD-10-CM

## 2020-09-23 DIAGNOSIS — Q25.1 AORTA COARCTATION: ICD-10-CM

## 2020-09-23 DIAGNOSIS — R06.02 SHORT OF BREATH ON EXERTION: ICD-10-CM

## 2020-09-23 DIAGNOSIS — I47.1 PAROXYSMAL SVT (SUPRAVENTRICULAR TACHYCARDIA) (HCC): Primary | ICD-10-CM

## 2020-09-23 DIAGNOSIS — I47.1 PAROXYSMAL SVT (SUPRAVENTRICULAR TACHYCARDIA) (HCC): ICD-10-CM

## 2020-09-23 PROCEDURE — 99214 OFFICE O/P EST MOD 30 MIN: CPT | Performed by: INTERNAL MEDICINE

## 2020-09-23 PROCEDURE — 93000 ELECTROCARDIOGRAM COMPLETE: CPT | Performed by: INTERNAL MEDICINE

## 2020-09-23 NOTE — PROGRESS NOTES
[facility]: [place]  [phone]    HPI:  Ms. Mara Chandler is a 37 yo F with h/o pre-hypertension, congential heart disease (surgery at age 11 at [facility]- \"hole\" on the outside), asthma and KAYLI initially seen for frequent and bothersome palpitations. Event monitor demonstrated episodes of SVT with HR > 150 bpm and replaced hyzaar with metoprolol. 8/2014 echo was normal.  Cardiac MRI 10/2016 demonstrated that the previously placed ASD patch with no residual shunting; there was a persistent left SVC between the left atrial appendage and the left superior pulmonary vein to join the coronary sinus. There was also a mild coarctation of the aorta between the ascending and aortic arch in the proximal descending thoracic aorta. The coarctation measured 14 mm in diameter; distal to the coarctation post dilation at 28 mm and proximally 29 mm. The ascending aorta was dilated at 43 x 38 mm. At last her visit due to MRI noting aortic coarctation, she was suppose to have a CT scan for surveillance, but she did not follow up for insurance reasons. Since her last visit, she has had increased palpitations that occur a few times a month, she is very concerned that it often happens at work and she will have associated lightheadedness and dizziness. There was an occasion this past month where she felt presyncopal with this. We did talk in the past about possible ablation and she is very interested in pursuing that now. Her echocardiogram in 03/2020 demonstrated normal LV function and no residual from her previous ASD repair. Her ascending aorta was mildly dilated at 4 cm, but they did note possibility of aortic coarctation could not be excluded. She is compensated on exam with clear lungs and no lower extremity edema. Assessment and Plan:    1. Paroxysmal supraventricular tachycardia. She remains in normal sinus rhythm here. She is having a lot of breakthrough symptoms despite Metoprolol.   She is interested in SVT ablation and will have her see our EP, Dr. Jania Hatfield as she has had symptoms/presyncope despite medical therapy. 2. Aortic coarctation. Noted on MRI in 2016. Will obtain a CT of the chest and abdomen and pelvis without contrast for further evaluation. 3. ASD, status post repair. Normal imaging by cardiac MRI in the past, as well as recent echocardiogram.   4. Persistent SVC. 5. Tobacco use. Encouraged cessation. 6. Essential hypertension. Blood pressure is controlled. 7. Anxiety. She  has a past medical history of Allergy, Anxiety, Asthma, and Congenital heart disease. She also has no past medical history of Myocardial infarction [facility]) or Pulmonary embolism ([facility] [address] All other systems negative except as above. PE  There were no vitals filed for this visit. There is no height or weight on file to calculate BMI.    General appearance - alert, well appearing, and in no distress  Mental status - affect appropriate to mood  Eyes - sclera anicteric, moist mucous membranes  Neck - supple, no significant adenopathy, no bruits, no JVD  Chest - clear to auscultation, no wheezes, rales or rhonchi  Heart - normal rate, regular rhythm, normal S1, S2, I/VI systolic murmur LUSB  Abdomen - soft, nontender, nondistended, no masses or organomegaly  Extremities - peripheral pulses normal, no pedal edema      Recent Labs:  Lab Results   Component Value Date/Time    Cholesterol, total 193 04/03/2017 10:16 AM    HDL Cholesterol 61 04/03/2017 10:16 AM    LDL, calculated 112 (H) 04/03/2017 10:16 AM    Triglyceride 102 04/03/2017 10:16 AM     Lab Results   Component Value Date/Time    Creatinine 0.63 04/03/2017 10:16 AM     Lab Results   Component Value Date/Time    BUN 10 04/03/2017 10:16 AM     Lab Results   Component Value Date/Time    Potassium 4.3 04/03/2017 10:16 AM     No results found for: HBA1C, [id number]  Lab Results   Component Value Date/Time    HGB 15.1 04/03/2017 10:16 AM     Lab Results   Component Value Date/Time    PLATELET 71Xander 38/26/9883 10:16 AM       Reviewed:  Past Medical History:   Diagnosis Date    Allergy     Anxiety     Asthma     Congenital heart disease      Social History     Tobacco Use   Smoking Status Light Tobacco Smoker    Packs/day: 0.25    Years: 2.00    Pack years: 0.50    Types: Cigarettes   Smokeless Tobacco Never Used     Social History     Substance and Sexual Activity   Alcohol Use Yes    Alcohol/week: 0.0 standard drinks    Comment: socially     Allergies   Allergen Reactions    Codeine Unknown (comments)    Hydrocodone Other (comments)    Pcn [Penicillins] Unknown (comments)    Zithromax [Azithromycin] Rash       Current Outpatient Medications   Medication Sig    losartan (COZAAR) 25 mg tablet TAKE ONE TABLET BY MOUTH DAILY    metoprolol tartrate (LOPRESSOR) 100 mg IR tablet TAKE ONE TABLET BY MOUTH TWICE A DAY    bpse-ujufppb-jki-hydrocort (CORTISPORIN) 3.5-400-10,000 mg-unit/g-1% ophthalmic ointment Apply thin laye inside and on upper eyelid 3 times/ day    albuterol (PROVENTIL HFA, VENTOLIN HFA, PROAIR HFA) 90 mcg/actuation inhaler Take 1 Puff by inhalation every four (4) hours as needed for Wheezing.  MULTIVITS,CA,MINERALS/IRON/FA (ONE-A-DAY WOMENS FORMULA PO) Take  by mouth.  Omega-3-DHA-EPA-Fish Oil 1,000 (120-180) mg cap Take 1,000 mg by mouth daily. No current facility-administered medications for this visit.         Annemarie Prince MD  [facility] [place]  [address] [facility] [address] [place] [address]

## 2020-10-22 ENCOUNTER — OFFICE VISIT (OUTPATIENT)
Dept: CARDIOLOGY CLINIC | Age: 40
End: 2020-10-22
Payer: COMMERCIAL

## 2020-10-22 VITALS
SYSTOLIC BLOOD PRESSURE: 130 MMHG | WEIGHT: 252 LBS | HEIGHT: 65 IN | HEART RATE: 57 BPM | DIASTOLIC BLOOD PRESSURE: 100 MMHG | BODY MASS INDEX: 41.99 KG/M2 | OXYGEN SATURATION: 96 % | RESPIRATION RATE: 20 BRPM

## 2020-10-22 DIAGNOSIS — I47.1 PSVT (PAROXYSMAL SUPRAVENTRICULAR TACHYCARDIA) (HCC): ICD-10-CM

## 2020-10-22 DIAGNOSIS — Q21.10 RESIDUAL ASD (ATRIAL SEPTAL DEFECT) FOLLOWING REPAIR: ICD-10-CM

## 2020-10-22 DIAGNOSIS — E66.01 SEVERE OBESITY (HCC): ICD-10-CM

## 2020-10-22 DIAGNOSIS — R00.2 PALPITATION: ICD-10-CM

## 2020-10-22 DIAGNOSIS — Z01.812 PRE-PROCEDURE LAB EXAM: ICD-10-CM

## 2020-10-22 DIAGNOSIS — Q26.1 PERSISTENT LEFT SVC (SUPERIOR VENA CAVA): ICD-10-CM

## 2020-10-22 DIAGNOSIS — Q25.1 AORTA COARCTATION: ICD-10-CM

## 2020-10-22 DIAGNOSIS — I51.7 ENLARGED LA (LEFT ATRIUM): ICD-10-CM

## 2020-10-22 DIAGNOSIS — I47.1 PSVT (PAROXYSMAL SUPRAVENTRICULAR TACHYCARDIA) (HCC): Primary | ICD-10-CM

## 2020-10-22 PROCEDURE — 99215 OFFICE O/P EST HI 40 MIN: CPT | Performed by: INTERNAL MEDICINE

## 2020-10-22 RX ORDER — DILTIAZEM HYDROCHLORIDE 120 MG/1
120 CAPSULE, COATED, EXTENDED RELEASE ORAL DAILY
Qty: 30 CAP | Refills: 5 | Status: SHIPPED | OUTPATIENT
Start: 2020-10-22 | End: 2021-04-16

## 2020-10-22 NOTE — PATIENT INSTRUCTIONS
Stop Losartan Start Cardizem 120 mg daily Your EP Study and SVT (supraventricular tachycardia) Ablation procedure has been scheduled for 12/9/20 at 8:00 am, at [facility]. 
 
Please report to Admitting Department by 6:15 am, or 2 hours prior to your scheduled procedure. Please bring a list of your current medications and medication bottles, if able, to the hospital on this day. You will be unable to drive after your procedure so please make sure to bring someone with you to your procedure. You will need to have nothing to eat or drink after midnight, the night prior to your procedure. You may have small sips of water, if needed, to take with your medication. You will need labs drawn prior to your procedure. Please go to [facility] to have this done no sooner than 11/9/20 and no later than 12/2/20. You will be scheduled for a Chest CTA. Please call central scheduling at [phone] to schedule your test. 
 
You will also need to see Dr. Viviana Hernandez Nurse Practitioner, [facility], in office prior to your procedure. An appointment has been scheduled for 11/20/20 at 10:20 am.  
 
You should stop your medication, Toprol and Cardizem, 2 days prior to your scheduled procedure. During that time, please take your Losartan 25 mg daily for blood pressure control. You will need to be tested for COVID-19 prior to your procedure. Please go on 12/6/20. After your procedure, you will need to follow up with Dr. Douglas Franco.  Your follow-up appointment has been scheduled for 12/29/20 at 9:00 am.

## 2020-10-22 NOTE — PROGRESS NOTES
Cardiac Electrophysiology OFFICE Consultation Note     Subjective: Reanna Miner is a 36 y.o. 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 The coronary sinus is mildly dilated. It appears that there  is a patch repair of the unroofed coronary sinus into the left atrium. The patch  repair is competent and does not demonstrate any residual shunting between the  coronary sinus and the left atrium. The coronary sinus continues is normal  course and drains into the right atrium. The interatrial atrial septum is intact  without any shunting. All pulmonary veins drain into the left atrium without any  sinus venosus defect. 2. The distal apical septal wall demonstrate deep invagination of the trabecular  myocardium which is likely a old spontaneously healed small muscular VSD. There  is no residual shunting at this level. 3. Bicuspid aortic valve (common with persistent left SVC). A very prominent eustachian valve in the right atrium (normal variant). 5. There is focal mild coarctation of the aorta between the aortic arch and the  proximal descending thoracic aorta. The coarctation measures 14 mm in diameter. There is flow turbulence but no significant gradient across the coarctation. Distal to the coarctation there is post coarctation aortic dilatation and the  proximal descending thoracic aorta measures 28 mm and tapers down to normal  dimension at the mid and distal level. Proximally the aortic arch is mildly  dilated and measures 29 mm. The ascending aorta is dilated and measures 43 x 38  mm in the mid section.   6. Normal left ventricular cavity size with normal left ventricular systolic  function. LVEF 65%. 7. Normal right ventricular size and systolic function. RVEF 60%. 8. Normal pleura and pericardium. There is no significant effusions.     CARDIAC CHAMBERS:     Left Atrium:  39 mm (<40mm)  Left Ventricular Size: LVEDD:  56 mm (Normal 37-55mm)  Left Ventricular Size: LVESD:  39 mm (Normal <40mm)  LVEF:  65 % (Normal 55-75%)  LV Hypertrophy: None, LV septal wall = 12 mm, LV posterior wall =  7 mm (Normal  <11mm)      Right Atrium:  44 mm (<40mm)  Right Ventricular Size: RVEDD:  42 mm (Normal 26-45mm)  Right Ventricular Size: RVESD:  28 mm (Normal <25mm)  RVEF:  60 % (Normal 50-60%)  RV Hypertrophy:  None     03/31/20   ECHO ADULT COMPLETE 03/31/2020 3/31/2020    Narrative · Normal cavity size and systolic function (ejection fraction normal) with   mild to moderate hypertrophy. Estimated left ventricular ejection fraction   is 50 - 55%. No regional wall motion abnormality noted. · Severely dilated left atrium. s/p previous ASD repair with no evidence   of residual ASD. · Mild mitral valve regurgitation is present. · Dilated ascending aorta; diameter is 4 cm. Possibility of aortic coarct   cannot be excluded. CT chest pending to evaluate this further. Signed by: Barbra Palma MD       When she is in SVT she can break by bearing down or try to vomit     Her mother is here with her  No sudden death in family    Patient Active Problem List   Diagnosis Code    Obesity (BMI 30-39. 9) E66.9    HTN (hypertension) I10    ASD (atrial septal defect) repair Q21.1    Aorta coarctation Q25.1    Paroxysmal SVT (supraventricular tachycardia) (HCC) I47.1    Severe obesity (HCC) E66.01     Current Outpatient Medications   Medication Sig Dispense Refill    losartan (COZAAR) 25 mg tablet TAKE ONE TABLET BY MOUTH DAILY (Patient taking differently: Take 25 mg by mouth.) 30 Tab 5    metoprolol tartrate (LOPRESSOR) 100 mg IR tablet TAKE ONE TABLET BY MOUTH TWICE A DAY 60 Tab 5    albuterol (PROVENTIL HFA, VENTOLIN HFA, PROAIR HFA) 90 mcg/actuation inhaler Take 1 Puff by inhalation every four (4) hours as needed for Wheezing. 1 Inhaler 3    MULTIVITS,CA,MINERALS/IRON/FA (ONE-A-DAY WOMENS FORMULA PO) Take  by mouth.  Omega-3-DHA-EPA-Fish Oil 1,000 (120-180) mg cap Take 1,000 mg by mouth daily.  snuy-bvwjsif-rwp-hydrocort (CORTISPORIN) 3.5-400-10,000 mg-unit/g-1% ophthalmic ointment Apply thin laye inside and on upper eyelid 3 times/ day 1 Tube 0     Allergies   Allergen Reactions    Codeine Unknown (comments)     Patient doesntt recall the reaction    Hydrocodone Other (comments)     She becomes mean     Pcn [Penicillins] Unknown (comments)    Zithromax [Azithromycin] Rash     Past Medical History:   Diagnosis Date    Allergy     Anxiety     Asthma     Congenital heart disease      Past Surgical History:   Procedure Laterality Date    HX DILATION AND CURETTAGE      endometriosis and precervical ca    HX HEENT      tonsillectomy,DNS sx    HX ORTHOPAEDIC      right tibia fx,no sx needed     Family History   Problem Relation Age of Onset    Hypertension Mother      Social History     Tobacco Use    Smoking status: Light Tobacco Smoker     Packs/day: 0.25     Years: 2.00     Pack years: 0.50     Types: Cigarettes    Smokeless tobacco: Never Used    Tobacco comment: 1 pack in 3 weeks    Substance Use Topics    Alcohol use: Not Currently     Alcohol/week: 0.0 standard drinks     Comment: socially        Review of Systems:   Constitutional: Negative for fever, chills, weight loss, malaise/fatigue. HEENT: Negative for nosebleeds, vision changes. Respiratory: Negative for cough, hemoptysis  Cardiovascular: Negative for chest pain, + palpitations, no orthopnea, claudication, leg swelling, syncope, and PND. Gastrointestinal: Negative for nausea, vomiting, diarrhea, blood in stool and melena. Genitourinary: Negative for dysuria, and hematuria. Musculoskeletal: Negative for myalgias, arthralgia. Skin: Negative for rash. Heme: Does not bleed or bruise easily. Neurological: Negative for speech change and focal weakness     Objective:     Visit Vitals  BP (!) 130/100 (BP 1 Location: Left arm, BP Patient Position: Sitting)   Pulse (!) 57   Resp 20   Ht 5' 5\" (1.651 m)   Wt 252 lb (114.3 kg)   SpO2 96%   BMI 41.93 kg/m²      Physical Exam:   Constitutional: well-developed and well-nourished. No respiratory distress. Head: Normocephalic and atraumatic. Eyes: Pupils are equal, round  ENT: hearing normal  Neck: supple. No JVD present. Cardiovascular: Normal rate, regular rhythm. Exam reveals no gallop and no friction rub. No murmur heard. Pulmonary/Chest: Effort normal and breath sounds normal. No wheezes. Abdominal: Soft, no tenderness. Obese  Musculoskeletal: no edema. Neurological: alert, oriented. Skin: Skin is warm and dry  Psychiatric: normal mood and affect. Behavior is normal. Judgment and thought content normal.         Assessment/Plan:       ICD-10-CM ICD-9-CM    1. PSVT (paroxysmal supraventricular tachycardia) (HCC)  I47.1 427.0    2. Palpitation  R00.2 785.1    3. ASD (atrial septal defect) repair  Q21.1 745.5    4. Aorta coarctation  Q25.1 747.10    5. Severe obesity ([address]  E66.01 278.01    6. Persistent left SVC (superior vena cava)  Q26.1 747.49    7. Enlarged LA (left atrium)  I51.7 429. 3      The way she breaks her arrhythmia suggested it is AV node dependent but anatomically, she is more likely to have atrial tach or atrial fibrillation, atrial flutter  I suspect her SVT is in persistent left SVC  This can be mapped from right atrium  She will stop losartan and add cardizem to metoprolol for BP and HR management  The risks and benefits of procedure were discussed and she and her mother agreed to proceed  She has several sites of congenital heart disease repairs so the success of the procedure is dependent on whether the site of arrhythmia can be reached with ablation catheter and if arrhythmia can be induced  She prefers procedure over medication  Risks include but are not limited to bleeding in the groin, infection in the groin and/or heart valves, fistula between the groin arteries and veins, valvular damage, diaphragmatic paralysis, aortic dissection, heart attack, stroke, blood clot in the leg, pulmonary embolism, lung collapse (pneumothorax or hemothorax), heart collapse (pericardial tamponade), death. The added risks for left atrial ablation may be atrial-esophageal fistula, pulmonary vein stenoses, kidney failure (from contrast injection), higher risk of bleeding, stroke and heart attack. Elective or emergency surgery may be required to repair some of these complications. Prolonged hospitalization would be required. General anesthesia and transeptal catheterization may be required for the procedure     She has not done her chest/abd CTA and she will need to do this before the procedure    Thank you for involving me in this patient's care and please call with further concerns or questions. Navarro Espinoza M.D.   Electrophysiology/Cardiology  [facility] and [facility] [place]  [address] [place] [address]                             [phone]

## 2020-11-09 NOTE — PROGRESS NOTES
Cardiac Electrophysiology OFFICE Note     Subjective: Bola Dimas is a 36 y.o. patient who presents for follow up of PSVT, is scheduled for EPS & ablation on 12/09/2020. She was last seen in clinic on 10/22/2020, stopped losartan, added diltiazem to metoprolol for BP & HR management. Palpitations & SVT x 30 years. Typically breaks with bearing down or attempting to vomit. The patient still have some palpitation but she said she is better controlled now with the added diltiazem      Previous:  Echo (03/31/2020): LVEF 50-55%, mild to mod LVH, no RWMA, grade 2 diastolic dysfunction. Severely dilated LA, s/p prior ASD repair with no evidence of residual ASD. Mild MR. Dilated ascending aorta, diameter 4 cm; possibility of aortic coarct cannot be excluded. Cardiac MRI (10/11/2016): Persistent left SVC, courses between INEZ & left superior PV, continues caudally to join CS. CS mildly dilated. Appears that there is a patch repair of the unroofed CS into the LA. Patch repair is competent, doesn't demonstrate any residual shunting between the CS & LA.  CS continues its normal course, drains into RA. Interatrial septum is intact without any shunting. All pulmonary veins drain to LA without any sinus venosus defect. Possible old spontaneously healed small muscular VSD at distal apical septal wall, no residual shunting. Bicuspid aortic valve, no AS, trace AR. Very prominent eustachian valve in LA. Focal mild coarctation of the aorta between the aortic arch & prox descending thoracic aorta . Coarctation measures 14 mm in diameter. Flow turbulence, but no significant gradient across coarctation. Distal to coarctation, aortic dilatation; prox descending thoracic aorta measures 28 mm, tapers down to normal dimension at mid & distal level. Proximally, aortic arch mildly dilated, 29 mm. Ascending aorta dilated, measures 43 x 38 mm in mid section. LVEF 65%. RVEF 60%.     Exercise stress test (09/21/2016): ECG WNL at rest & with stress. Occasional PVCs. Good exercise tolerance, no angina with exercise. Borderline coarctation of the aorta & persistent left SVC. ASD closure at age 11. Primary cardiologist is Dr. Thang Regan.    No family history of sudden cardiac death.          Patient Active Problem List   Diagnosis Code    Obesity (BMI 30-39. 9) E66.9    HTN (hypertension) I10    ASD (atrial septal defect) repair Q21.1    Aorta coarctation Q25.1    Paroxysmal SVT (supraventricular tachycardia) ([facility]) I47.1    Severe obesity (HCC) E66.01     Current Outpatient Medications   Medication Sig Dispense Refill    dilTIAZem ER (CARDIZEM CD) 120 mg capsule Take 1 Cap by mouth daily. 30 Cap 5    metoprolol tartrate (LOPRESSOR) 100 mg IR tablet TAKE ONE TABLET BY MOUTH TWICE A DAY 60 Tab 5    albuterol (PROVENTIL HFA, VENTOLIN HFA, PROAIR HFA) 90 mcg/actuation inhaler Take 1 Puff by inhalation every four (4) hours as needed for Wheezing. 1 Inhaler 3    MULTIVITS,CA,MINERALS/IRON/FA (ONE-A-DAY WOMENS FORMULA PO) Take  by mouth.  Omega-3-DHA-EPA-Fish Oil 1,000 (120-180) mg cap Take 1,000 mg by mouth daily.        Allergies   Allergen Reactions    Codeine Unknown (comments)     Patient doesntt recall the reaction    Hydrocodone Other (comments)     She becomes mean     Pcn [Penicillins] Unknown (comments)    Zithromax [Azithromycin] Rash     Past Medical History:   Diagnosis Date    Allergy     Anxiety     Asthma     Congenital heart disease      Past Surgical History:   Procedure Laterality Date    HX DILATION AND CURETTAGE      endometriosis and precervical ca    HX HEENT      tonsillectomy,DNS sx    HX ORTHOPAEDIC      right tibia fx,no sx needed     Family History   Problem Relation Age of Onset    Hypertension Mother      Social History     Tobacco Use    Smoking status: Light Tobacco Smoker     Packs/day: 0.25     Years: 2.00     Pack years: 0.50     Types: Cigarettes    Smokeless tobacco: Never Used    Tobacco comment: 1 pack in 3 weeks    Substance Use Topics    Alcohol use: Not Currently     Alcohol/week: 0.0 standard drinks     Comment: socially        Review of Systems: All other review of systems otherwise negative. Constitutional: Negative for fever, chills, weight loss, malaise/fatigue. HEENT: Negative for nosebleeds, vision changes. Respiratory: Negative for cough, hemoptysis. Cardiovascular: Negative for chest pain, + palpitations, no orthopnea, claudication, leg swelling, syncope, and PND. Gastrointestinal: Negative for nausea, vomiting, diarrhea, blood in stool and melena. Genitourinary: Negative for dysuria, and hematuria. Musculoskeletal: Negative for myalgias, arthralgia. Skin: Negative for rash. Heme: Does not bleed or bruise easily. Neurological: Negative for speech change and focal weakness. Objective:     Visit Vitals  /78 (BP 1 Location: Left arm, BP Patient Position: Sitting)   Pulse 62   Resp 15   Ht 5' 5.5\" (1.664 m)   Wt 251 lb (113.9 kg)   SpO2 96%   BMI 41.13 kg/m²          Physical Exam:   Constitutional: Well-developed and well-nourished. No respiratory distress. Head: Normocephalic and atraumatic. Eyes: Pupils are equal, round. ENT: Hearing grossly normal.  Neck: supple. No JVD present. Cardiovascular: Normal rate, regular rhythm. Exam reveals no gallop and no friction rub. No murmur heard. Pulmonary/Chest: Effort normal and breath sounds normal. No wheezes. Abdominal: Soft, no tenderness. Obese  Musculoskeletal: No edema. Neurological: Alert, oriented. Skin: Skin is warm and dry. Psychiatric: Normal mood and affect. Behavior is normal. Judgment and thought content normal.         Assessment/Plan:       ICD-10-CM ICD-9-CM    1. PSVT (paroxysmal supraventricular tachycardia) (HCC)  I47.1 427.0    2. ASD (atrial septal defect) repair  Q21.1 745.5    3. Aorta coarctation  Q25.1 747.10    4.  Severe obesity ([address] E66.01 278.01    5. Persistent left SVC (superior vena cava)  Q26.1 747.49      Manner of breaking arrhythmic suggests it is AV node dependent, but anatomically, she is more likely to have atrial tach or AF/AFL. Suspect her SVT is in persistent left SVT, can be mapped from RA. She has several sites of congenital heart disease surgical repairs; success of upcoming EPS & ablation is dependent on whether the site of the arrhythmia can be reached with ablation catheter & if arrhythmia can be induced. BP & HR tolerated new medication since stopping losartan & adding diltiazem. Risks/benefits of EPS & catheter ablation of PSVT reviewed. She prefers procedure over pharmacological management. She agrees to proceed as scheduled. Chest/abd CTA pending. Lab slip given. Dr. Grant Hurtado wanted to study coarctation of aorta with the CT scan    Addendum  Chest CT:  Persistent left SVC and coarctation of aorta as known  Liver mass needs further evaluation-recommend liver MRI (could do after EP study)  Future Appointments   Date Time Provider Bassem Jack   11/10/2020  8:20 AM MD MARIO Maldonado BS AMB   12/29/2020  9:00 AM MD MARIO Maldonado BS AMB   3/24/2021  1:40 PM MD MARIO Taylor BS AMB       Thank you for involving me in this patient's care and please call with further concerns or questions. Tayler Ceballos M.D.   Electrophysiology/Cardiology  [facility] and [facility] [place]  [address] [place] [address]                             [phone]

## 2020-11-10 ENCOUNTER — OFFICE VISIT (OUTPATIENT)
Dept: CARDIOLOGY CLINIC | Age: 40
End: 2020-11-10
Payer: COMMERCIAL

## 2020-11-10 VITALS
OXYGEN SATURATION: 96 % | DIASTOLIC BLOOD PRESSURE: 78 MMHG | SYSTOLIC BLOOD PRESSURE: 128 MMHG | HEIGHT: 66 IN | HEART RATE: 62 BPM | WEIGHT: 251 LBS | BODY MASS INDEX: 40.34 KG/M2 | RESPIRATION RATE: 15 BRPM

## 2020-11-10 DIAGNOSIS — E66.01 SEVERE OBESITY (HCC): ICD-10-CM

## 2020-11-10 DIAGNOSIS — Q21.10 RESIDUAL ASD (ATRIAL SEPTAL DEFECT) FOLLOWING REPAIR: ICD-10-CM

## 2020-11-10 DIAGNOSIS — Q26.1 PERSISTENT LEFT SVC (SUPERIOR VENA CAVA): ICD-10-CM

## 2020-11-10 DIAGNOSIS — Q25.1 AORTA COARCTATION: ICD-10-CM

## 2020-11-10 DIAGNOSIS — I47.1 PSVT (PAROXYSMAL SUPRAVENTRICULAR TACHYCARDIA) (HCC): Primary | ICD-10-CM

## 2020-11-10 PROCEDURE — 99214 OFFICE O/P EST MOD 30 MIN: CPT | Performed by: INTERNAL MEDICINE

## 2020-11-10 NOTE — PATIENT INSTRUCTIONS
Your EP Study and SVT (supraventricular tachycardia) Ablation procedure has been scheduled for 12/9/20 at 8:00 am, at [facility]. 
  
Please report to Admitting Department by 6:15 am, or 2 hours prior to your scheduled procedure. Please bring a list of your current medications and medication bottles, if able, to the hospital on this day. You will be unable to drive after your procedure so please make sure to bring someone with you to your procedure. 
  
You will need to have nothing to eat or drink after midnight, the night prior to your procedure. You may have small sips of water, if needed, to take with your medication. 
  
You will need labs drawn prior to your procedure. Please go to [facility] to have this done no sooner than 11/9/20 and no later than 12/2/20.   
  
You will be scheduled for a Chest CTA. Please call central scheduling at [phone] to schedule your test. 
  
You should stop your medication, Toprol and Cardizem, 2 days prior to your scheduled procedure. During that time, please take your Losartan 25 mg daily for blood pressure control.  
  
You will need to be tested for COVID-19 prior to your procedure. Please go on 12/6/20. 
  
After your procedure, you will need to follow up with Dr. Douglas Franco.  Your follow-up appointment has been scheduled for 12/29/20 at 9:00 am.

## 2020-11-11 LAB
BASOPHILS # BLD AUTO: 0.1 X10E3/UL (ref 0–0.2)
BASOPHILS NFR BLD AUTO: 1 %
BUN SERPL-MCNC: 11 MG/DL (ref 6–24)
BUN/CREAT SERPL: 15 (ref 9–23)
CALCIUM SERPL-MCNC: 9.1 MG/DL (ref 8.7–10.2)
CHLORIDE SERPL-SCNC: 105 MMOL/L (ref 96–106)
CO2 SERPL-SCNC: 25 MMOL/L (ref 20–29)
CREAT SERPL-MCNC: 0.72 MG/DL (ref 0.57–1)
EOSINOPHIL # BLD AUTO: 0.3 X10E3/UL (ref 0–0.4)
EOSINOPHIL NFR BLD AUTO: 4 %
ERYTHROCYTE [DISTWIDTH] IN BLOOD BY AUTOMATED COUNT: 12.6 % (ref 11.7–15.4)
GLUCOSE SERPL-MCNC: 103 MG/DL (ref 65–99)
HCT VFR BLD AUTO: 44.8 % (ref 34–46.6)
HGB BLD-MCNC: 15 G/DL (ref 11.1–15.9)
IMM GRANULOCYTES # BLD AUTO: 0 X10E3/UL (ref 0–0.1)
IMM GRANULOCYTES NFR BLD AUTO: 0 %
LYMPHOCYTES # BLD AUTO: 1.5 X10E3/UL (ref 0.7–3.1)
LYMPHOCYTES NFR BLD AUTO: 23 %
MCH RBC QN AUTO: 30.4 PG (ref 26.6–33)
MCHC RBC AUTO-ENTMCNC: 33.5 G/DL (ref 31.5–35.7)
MCV RBC AUTO: 91 FL (ref 79–97)
MONOCYTES # BLD AUTO: 0.8 X10E3/UL (ref 0.1–0.9)
MONOCYTES NFR BLD AUTO: 12 %
NEUTROPHILS # BLD AUTO: 4 X10E3/UL (ref 1.4–7)
NEUTROPHILS NFR BLD AUTO: 60 %
PLATELET # BLD AUTO: 294 X10E3/UL (ref 150–450)
POTASSIUM SERPL-SCNC: 4.2 MMOL/L (ref 3.5–5.2)
RBC # BLD AUTO: 4.93 X10E6/UL (ref 3.77–5.28)
SODIUM SERPL-SCNC: 141 MMOL/L (ref 134–144)
WBC # BLD AUTO: 6.7 X10E3/UL (ref 3.4–10.8)

## 2020-11-20 ENCOUNTER — HOSPITAL ENCOUNTER (OUTPATIENT)
Dept: CT IMAGING | Age: 40
Discharge: HOME | End: 2020-11-20
Attending: INTERNAL MEDICINE
Payer: COMMERCIAL

## 2020-11-20 DIAGNOSIS — Q25.1 AORTA COARCTATION: ICD-10-CM

## 2020-11-20 PROCEDURE — 74011000636 HC RX REV CODE- 636: Performed by: INTERNAL MEDICINE

## 2020-11-20 PROCEDURE — 74174 CTA ABD&PLVS W/CONTRAST: CPT

## 2020-11-20 PROCEDURE — 74011000258 HC RX REV CODE- 258: Performed by: INTERNAL MEDICINE

## 2020-11-20 RX ORDER — SODIUM CHLORIDE 0.9 % (FLUSH) 0.9 %
10 SYRINGE (ML) INJECTION
Status: COMPLETED | OUTPATIENT
Start: 2020-11-20 | End: 2020-11-20

## 2020-11-20 RX ADMIN — IOPAMIDOL 100 ML: 755 INJECTION, SOLUTION INTRAVENOUS at 10:43

## 2020-11-20 RX ADMIN — SODIUM CHLORIDE 100 ML: 900 INJECTION, SOLUTION INTRAVENOUS at 10:43

## 2020-11-20 RX ADMIN — Medication 10 ML: at 10:43

## 2020-11-23 ENCOUNTER — TELEPHONE (OUTPATIENT)
Dept: CARDIOLOGY CLINIC | Age: 40
End: 2020-11-23

## 2020-11-23 NOTE — TELEPHONE ENCOUNTER
----- Message from Que Fish MD sent at 11/20/2020  3:51 PM EST -----  Please let pt know her aortic coart was ok. The anatomy of her vessels have variants and will update Dr. Basilio Or in considering ablation. She does have abnormal findings to the liver of which I do not know the significance. Recommend follow up with PCP to discuss the liver findings. Please send a report of this to her PCP as well.  Thx       ----- Message -----  From: Conor Izquierdo Results In  Sent: 11/20/2020   3:44 PM EST  To: Que Fish MD

## 2020-11-24 ENCOUNTER — TELEPHONE (OUTPATIENT)
Dept: CARDIOLOGY CLINIC | Age: 40
End: 2020-11-24

## 2020-11-24 DIAGNOSIS — R16.0 LIVER MASS: Primary | ICD-10-CM

## 2020-11-24 NOTE — TELEPHONE ENCOUNTER
Verified patient with two types of identifiers. Notified patient of results and MD recommendations. Patient had a lot of questions about severity and also concerns regarding out of pocket expenses. Phone call passed to Dr. Lizzie Sainz to address patient's concerns. Patient agreed to Liver MRI. Order placed. Notified patient someone from central scheduling will be reaching out to schedule. Patient verbalized understanding and will call with any other questions.

## 2020-11-24 NOTE — TELEPHONE ENCOUNTER
Called patient. Two patient indentifiers verified. Pt was informed of test results. Pt verbalized understanding and denies any further questions at this time.      Future Appointments   Date Time Provider Bassem Marcie   12/29/2020  9:00 AM MD MARIO Gudino AMB   3/24/2021  1:40 PM MD MARIO Jeffery AMB

## 2020-11-24 NOTE — TELEPHONE ENCOUNTER
----- Message from Zackary Burks MD sent at 11/24/2020  8:26 AM EST -----  Persistent left SVC and coarctation of aorta as known  Liver mass needs further evaluation-recommend liver MRI (could do after EP study)

## 2020-11-24 NOTE — PROGRESS NOTES
Persistent left SVC and coarctation of aorta as known  Liver mass needs further evaluation-recommend liver MRI (could do after EP study)

## 2020-11-25 ENCOUNTER — TELEPHONE (OUTPATIENT)
Dept: CARDIOLOGY CLINIC | Age: 40
End: 2020-11-25

## 2020-11-25 NOTE — TELEPHONE ENCOUNTER
Called pt two patient identifiers confirmed. Pt would like to speak only to [address] [place] [address] and asked for her to call back.

## 2020-11-25 NOTE — TELEPHONE ENCOUNTER
Patient is requesting a return call in regards to your discussion yesterday. She would not provide me with any additional information. Please advise.     Phone #: [phone]  Thanks

## 2020-11-25 NOTE — TELEPHONE ENCOUNTER
Verified patient with two types of identifiers. Patient wanting to know where the nodules were location. Notified patient that per the report \"Multiple masses of the liver, involving both lobes. Some of these demonstrate  irregular internal enhancement. The largest individual mass measures up to 5 cm. \" Patient feeling very over whelmed by results. Patient scheduled for MRI 12/3/20. Notified patient will call with the results and MD recommendations at that point. Notified patient will move forward with procedure as scheduled. Reviewed that patient is to hold her Metoprolol and Diltiazem 2 days prior to her procedure and take her Losartan 25 mg daily on those days. Patient verbalized understanding and will call with any other questions.

## 2020-12-02 RX ORDER — SODIUM CHLORIDE 0.9 % (FLUSH) 0.9 %
5-40 SYRINGE (ML) INJECTION AS NEEDED
Status: CANCELLED | OUTPATIENT
Start: 2020-12-02

## 2020-12-02 RX ORDER — SODIUM CHLORIDE 0.9 % (FLUSH) 0.9 %
5-40 SYRINGE (ML) INJECTION EVERY 8 HOURS
Status: CANCELLED | OUTPATIENT
Start: 2020-12-02

## 2020-12-03 ENCOUNTER — HOSPITAL ENCOUNTER (OUTPATIENT)
Dept: MRI IMAGING | Age: 40
Discharge: HOME | End: 2020-12-03
Attending: INTERNAL MEDICINE
Payer: COMMERCIAL

## 2020-12-03 VITALS — WEIGHT: 250 LBS | BODY MASS INDEX: 40.97 KG/M2

## 2020-12-03 DIAGNOSIS — R16.0 LIVER MASS: ICD-10-CM

## 2020-12-03 PROCEDURE — A9585 GADOBUTROL INJECTION: HCPCS | Performed by: INTERNAL MEDICINE

## 2020-12-03 PROCEDURE — 74183 MRI ABD W/O CNTR FLWD CNTR: CPT

## 2020-12-03 PROCEDURE — 74011250636 HC RX REV CODE- 250/636: Performed by: INTERNAL MEDICINE

## 2020-12-03 RX ADMIN — GADOBUTROL 12 ML: 604.72 INJECTION INTRAVENOUS at 11:17

## 2020-12-04 ENCOUNTER — HOSPITAL ENCOUNTER (OUTPATIENT)
Dept: PREADMISSION TESTING | Age: 40
Discharge: HOME | End: 2020-12-04
Payer: COMMERCIAL

## 2020-12-04 ENCOUNTER — TELEPHONE (OUTPATIENT)
Dept: CARDIOLOGY CLINIC | Age: 40
End: 2020-12-04

## 2020-12-04 ENCOUNTER — TRANSCRIBE ORDER (OUTPATIENT)
Dept: REGISTRATION | Age: 40
End: 2020-12-04

## 2020-12-04 DIAGNOSIS — R16.0 LIVER MASS: Primary | ICD-10-CM

## 2020-12-04 DIAGNOSIS — Z01.812 PRE-PROCEDURE LAB EXAM: ICD-10-CM

## 2020-12-04 DIAGNOSIS — Z01.812 PRE-PROCEDURE LAB EXAM: Primary | ICD-10-CM

## 2020-12-04 PROCEDURE — 87635 SARS-COV-2 COVID-19 AMP PRB: CPT

## 2020-12-04 NOTE — PROGRESS NOTES
I called and talked to her about:  Innumerable hepatic masses within a background of cirrhosis. Some of the  masses have arterial enhancement compatible with hepatocellular carcinoma in  segments 5 and 6. Other hepatic masses are nonspecific. Consider biopsy versus  follow-up. I recommended and she agreed to being referred to   1. Dr Yared Hoffmann (oncology)  2.  Dr Wilber Zheng (hematology)

## 2020-12-04 NOTE — TELEPHONE ENCOUNTER
----- Message from Fransisco Garner MD sent at 12/4/2020  3:21 PM EST -----  I called and talked to her about:  Innumerable hepatic masses within a background of cirrhosis. Some of the  masses have arterial enhancement compatible with hepatocellular carcinoma in  segments 5 and 6. Other hepatic masses are nonspecific. Consider biopsy versus  follow-up. I recommended and she agreed to being referred to   1. Dr Cali Garcia (oncology)  2.  Dr Gianni Sams (hematology)

## 2020-12-04 NOTE — TELEPHONE ENCOUNTER
Verified patient with two types of identifiers. Patient states she had her COVID test today. Patient scheduled for SVT ablation on 12/9/20. Patient also wanted to know regarding results of recent MRI.  Will ask MD regarding results

## 2020-12-04 NOTE — TELEPHONE ENCOUNTER
Patient is calling because she has questions about her upcoming procedure and she is also inquiring about some results. Please advise.     Phone #: [phone]  Thanks

## 2020-12-05 LAB — SARS-COV-2, COV2NT: NOT DETECTED

## 2020-12-07 ENCOUNTER — TELEPHONE (OUTPATIENT)
Dept: CARDIOLOGY CLINIC | Age: 40
End: 2020-12-07

## 2020-12-07 NOTE — TELEPHONE ENCOUNTER
Patient is requesting a call back diane Juarez. No further details were given. Please advise.     Phone: [phone]

## 2020-12-07 NOTE — TELEPHONE ENCOUNTER
Verified patient with two types of identifiers. Patient wanted to verify that she is supposed to take her Losartan 25 mg daily while she is holding her Diltiazem and Lopressor prior to her ablation. Patient also wanted to know MD recommendations regarding Complete Liver Cleanse by [facility].  Will ask MD/NP

## 2020-12-09 ENCOUNTER — ANESTHESIA EVENT (OUTPATIENT)
Dept: CARDIAC CATH/INVASIVE PROCEDURES | Age: 40
End: 2020-12-09
Payer: COMMERCIAL

## 2020-12-09 ENCOUNTER — ANESTHESIA (OUTPATIENT)
Dept: CARDIAC CATH/INVASIVE PROCEDURES | Age: 40
End: 2020-12-09
Payer: COMMERCIAL

## 2020-12-09 ENCOUNTER — HOSPITAL ENCOUNTER (OUTPATIENT)
Age: 40
Setting detail: OUTPATIENT SURGERY
Discharge: HOME | End: 2020-12-09
Attending: INTERNAL MEDICINE | Admitting: INTERNAL MEDICINE
Payer: COMMERCIAL

## 2020-12-09 VITALS
BODY MASS INDEX: 42.49 KG/M2 | WEIGHT: 255 LBS | DIASTOLIC BLOOD PRESSURE: 90 MMHG | OXYGEN SATURATION: 95 % | HEIGHT: 65 IN | SYSTOLIC BLOOD PRESSURE: 128 MMHG | RESPIRATION RATE: 16 BRPM | HEART RATE: 111 BPM | TEMPERATURE: 97.9 F

## 2020-12-09 DIAGNOSIS — Q25.1 AORTA COARCTATION: ICD-10-CM

## 2020-12-09 DIAGNOSIS — I15.8 OTHER SECONDARY HYPERTENSION: ICD-10-CM

## 2020-12-09 DIAGNOSIS — I47.1 PAROXYSMAL SUPRAVENTRICULAR TACHYCARDIA (HCC): ICD-10-CM

## 2020-12-09 DIAGNOSIS — E66.9 OBESITY (BMI 30-39.9): ICD-10-CM

## 2020-12-09 LAB — ACT BLD: 213 SECS (ref 79–138)

## 2020-12-09 PROCEDURE — 99152 MOD SED SAME PHYS/QHP 5/>YRS: CPT | Performed by: INTERNAL MEDICINE

## 2020-12-09 PROCEDURE — 77030016894 HC CBL EP DX CATH3 STJU -B: Performed by: INTERNAL MEDICINE

## 2020-12-09 PROCEDURE — 85347 COAGULATION TIME ACTIVATED: CPT

## 2020-12-09 PROCEDURE — 77030003390 HC NDL ANGI MRTM -A: Performed by: INTERNAL MEDICINE

## 2020-12-09 PROCEDURE — C1894 INTRO/SHEATH, NON-LASER: HCPCS | Performed by: INTERNAL MEDICINE

## 2020-12-09 PROCEDURE — 74011000250 HC RX REV CODE- 250: Performed by: INTERNAL MEDICINE

## 2020-12-09 PROCEDURE — C1733 CATH, EP, OTHR THAN COOL-TIP: HCPCS | Performed by: INTERNAL MEDICINE

## 2020-12-09 PROCEDURE — 74011250636 HC RX REV CODE- 250/636: Performed by: INTERNAL MEDICINE

## 2020-12-09 PROCEDURE — 77030038099 HC CBL CATH DIAG D-AVSE ABBT -D: Performed by: INTERNAL MEDICINE

## 2020-12-09 PROCEDURE — C1730 CATH, EP, 19 OR FEW ELECT: HCPCS | Performed by: INTERNAL MEDICINE

## 2020-12-09 PROCEDURE — 77030018733 HC ELECTRD KT ENSITE STJU -G: Performed by: INTERNAL MEDICINE

## 2020-12-09 PROCEDURE — 77030016899 HC CBL EP EXT4 BSC -B: Performed by: INTERNAL MEDICINE

## 2020-12-09 PROCEDURE — 93655 ICAR CATH ABLTJ DSCRT ARRHYT: CPT | Performed by: INTERNAL MEDICINE

## 2020-12-09 PROCEDURE — 93613 INTRACARDIAC EPHYS 3D MAPG: CPT | Performed by: INTERNAL MEDICINE

## 2020-12-09 PROCEDURE — 99153 MOD SED SAME PHYS/QHP EA: CPT | Performed by: INTERNAL MEDICINE

## 2020-12-09 PROCEDURE — C1893 INTRO/SHEATH, FIXED,NON-PEEL: HCPCS | Performed by: INTERNAL MEDICINE

## 2020-12-09 PROCEDURE — 77030015398 HC CBL EP EXT STJU -C: Performed by: INTERNAL MEDICINE

## 2020-12-09 PROCEDURE — 77030039046 HC PAD DEFIB RADIOTRNSPNT CNMD -B: Performed by: INTERNAL MEDICINE

## 2020-12-09 PROCEDURE — 93653 COMPRE EP EVAL TX SVT: CPT | Performed by: INTERNAL MEDICINE

## 2020-12-09 PROCEDURE — 93656 COMPRE EP EVAL ABLTJ ATR FIB: CPT | Performed by: INTERNAL MEDICINE

## 2020-12-09 PROCEDURE — C1760 CLOSURE DEV, VASC: HCPCS | Performed by: INTERNAL MEDICINE

## 2020-12-09 PROCEDURE — 93621 COMP EP EVL L PAC&REC C SINS: CPT | Performed by: INTERNAL MEDICINE

## 2020-12-09 PROCEDURE — 93620 COMP EP EVL R AT VEN PAC&REC: CPT | Performed by: INTERNAL MEDICINE

## 2020-12-09 RX ORDER — LOSARTAN POTASSIUM 25 MG/1
25 TABLET ORAL DAILY
COMMUNITY
End: 2020-12-10

## 2020-12-09 RX ORDER — SODIUM CHLORIDE 9 MG/ML
25 INJECTION, SOLUTION INTRAVENOUS CONTINUOUS
Status: DISCONTINUED | OUTPATIENT
Start: 2020-12-09 | End: 2020-12-09 | Stop reason: HOSPADM

## 2020-12-09 RX ORDER — DIPHENHYDRAMINE HYDROCHLORIDE 50 MG/ML
INJECTION, SOLUTION INTRAMUSCULAR; INTRAVENOUS AS NEEDED
Status: DISCONTINUED | OUTPATIENT
Start: 2020-12-09 | End: 2020-12-09 | Stop reason: HOSPADM

## 2020-12-09 RX ORDER — SODIUM CHLORIDE 0.9 % (FLUSH) 0.9 %
5-40 SYRINGE (ML) INJECTION AS NEEDED
Status: DISCONTINUED | OUTPATIENT
Start: 2020-12-09 | End: 2020-12-09 | Stop reason: HOSPADM

## 2020-12-09 RX ORDER — LIDOCAINE HYDROCHLORIDE 10 MG/ML
INJECTION INFILTRATION; PERINEURAL AS NEEDED
Status: DISCONTINUED | OUTPATIENT
Start: 2020-12-09 | End: 2020-12-09 | Stop reason: HOSPADM

## 2020-12-09 RX ORDER — SODIUM CHLORIDE 0.9 % (FLUSH) 0.9 %
5-40 SYRINGE (ML) INJECTION EVERY 8 HOURS
Status: DISCONTINUED | OUTPATIENT
Start: 2020-12-09 | End: 2020-12-09

## 2020-12-09 RX ORDER — FENTANYL CITRATE 50 UG/ML
INJECTION, SOLUTION INTRAMUSCULAR; INTRAVENOUS AS NEEDED
Status: DISCONTINUED | OUTPATIENT
Start: 2020-12-09 | End: 2020-12-09 | Stop reason: HOSPADM

## 2020-12-09 RX ORDER — SODIUM CHLORIDE 0.9 % (FLUSH) 0.9 %
5-40 SYRINGE (ML) INJECTION EVERY 8 HOURS
Status: DISCONTINUED | OUTPATIENT
Start: 2020-12-09 | End: 2020-12-09 | Stop reason: HOSPADM

## 2020-12-09 RX ORDER — HEPARIN SODIUM 1000 [USP'U]/ML
INJECTION, SOLUTION INTRAVENOUS; SUBCUTANEOUS AS NEEDED
Status: DISCONTINUED | OUTPATIENT
Start: 2020-12-09 | End: 2020-12-09 | Stop reason: HOSPADM

## 2020-12-09 RX ORDER — HYDRALAZINE HYDROCHLORIDE 20 MG/ML
10 INJECTION INTRAMUSCULAR; INTRAVENOUS ONCE
Status: COMPLETED | OUTPATIENT
Start: 2020-12-09 | End: 2020-12-09

## 2020-12-09 RX ORDER — ONDANSETRON 2 MG/ML
INJECTION INTRAMUSCULAR; INTRAVENOUS AS NEEDED
Status: DISCONTINUED | OUTPATIENT
Start: 2020-12-09 | End: 2020-12-09 | Stop reason: HOSPADM

## 2020-12-09 RX ORDER — SODIUM CHLORIDE 0.9 % (FLUSH) 0.9 %
5-40 SYRINGE (ML) INJECTION AS NEEDED
Status: DISCONTINUED | OUTPATIENT
Start: 2020-12-09 | End: 2020-12-09

## 2020-12-09 RX ORDER — MIDAZOLAM HYDROCHLORIDE 1 MG/ML
INJECTION, SOLUTION INTRAMUSCULAR; INTRAVENOUS AS NEEDED
Status: DISCONTINUED | OUTPATIENT
Start: 2020-12-09 | End: 2020-12-09 | Stop reason: HOSPADM

## 2020-12-09 RX ORDER — ONDANSETRON 2 MG/ML
4 INJECTION INTRAMUSCULAR; INTRAVENOUS
Status: DISCONTINUED | OUTPATIENT
Start: 2020-12-09 | End: 2020-12-09 | Stop reason: HOSPADM

## 2020-12-09 RX ORDER — ACETAMINOPHEN 325 MG/1
650 TABLET ORAL
Status: DISCONTINUED | OUTPATIENT
Start: 2020-12-09 | End: 2020-12-09 | Stop reason: HOSPADM

## 2020-12-09 RX ADMIN — SODIUM CHLORIDE 25 ML/HR: 900 INJECTION, SOLUTION INTRAVENOUS at 07:44

## 2020-12-09 RX ADMIN — HYDRALAZINE HYDROCHLORIDE 10 MG: 20 INJECTION INTRAMUSCULAR; INTRAVENOUS at 07:47

## 2020-12-09 RX ADMIN — HYDRALAZINE HYDROCHLORIDE 10 MG: 20 INJECTION INTRAMUSCULAR; INTRAVENOUS at 08:19

## 2020-12-09 NOTE — PROCEDURES
Cardiac Procedure Note   Patient: Reanna Miner  MRN: [id number]  SSN: [id number]   YOB: 1980 Age: 36 y.o. Sex: female    Date of Procedure: 12/9/2020   Pre-procedure Diagnosis: PSVT, hypertension, morbid obesity  Post-procedure Diagnosis: right atrial tachycardia, atrial flutter  Procedure:   1. EP study  2. 3 D mapping  3. CS/LA recording and pacing  4.  Ablation of right atrial tachycardia and atrial flutter  :  Dr. Nahum Schwab MD    Assistant(s):  None  Anesthesia: Moderate Sedation   Estimated Blood Loss: Less than 10 mL   Specimens Removed: None  Findings: peritricuspid valve anterolateral atrial tachycardia and CTI atrial flutter ablation   Complications: None   Implants:  None  Signed by:  Nahum Schwab MD  12/9/2020  12:15 PM

## 2020-12-09 NOTE — INTERVAL H&P NOTE
Update History & Physical    The Patient's History and Physical of December 9, 2020 was reviewed with the patient and I examined the patient. There was no change. The surgical site was confirmed by the patient and me. She has been referred to hepatology and oncology for abnormal abdominal MRI  Plan:  The risk, benefits, expected outcome, and alternative to the recommended procedure have been discussed with the patient. Patient understands and wants to proceed with the procedure.     Electronically signed by Nicolas Bernard MD on 12/9/2020 at 7:19 AM

## 2020-12-09 NOTE — DISCHARGE INSTRUCTIONS
Resume medications including metoprolol and cardizem       Patient Instructions Post-EP Study and/or Ablation    1. No heavy lifting or exercises for 1 week. This includes the following:  Do not push or move furniture, jog or run    2. Do not drive for 3 days. 3.  Call Dr. Judy Cade at [phone] if you experience any of the following symptoms:  Dizziness, lightheadedness, fainting spells  Lack of energy  Shortness of breath  Rapid heart rate  Chest or muscle twitches  Blurry vision, double vision, weakness, numbness  Nausea, vomiting  Fever  Bleeding in the stool, black stool  Leg swelling, pain    4. Follow-up with Dr. Judy Cade as noted below. Future Appointments   Date Time Provider Bassem Jack   12/29/2020  9:00 AM MD MARIO Pang BS AMB   1/6/2021 12:30 PM Kaitlyn Beebe MD [facility] BS AMB   3/24/2021  1:40 PM MD MARIO Villa BS AMB     5. Follow up with hepatology in January as scheduled. 6.  Continue medications as previously prescribed. Lois Mondragon M.D.   Electrophysiology/Cardiology  [facility] and [facility] [place]  [address] [place] [address]                               [phone]

## 2020-12-09 NOTE — ANESTHESIA PREPROCEDURE EVALUATION
Relevant Problems   No relevant active problems       Anesthetic History   No history of anesthetic complications            Review of Systems / Medical History  Patient summary reviewed, nursing notes reviewed and pertinent labs reviewed    Pulmonary          Smoker  Asthma        Neuro/Psych   Within defined limits           Cardiovascular    Hypertension        Dysrhythmias : SVT           GI/Hepatic/Renal  Within defined limits              Endo/Other        Morbid obesity     Other Findings              Physical Exam    Airway  Mallampati: II  TM Distance: > 6 cm  Neck ROM: normal range of motion   Mouth opening: Normal     Cardiovascular  Regular rate and rhythm,  S1 and S2 normal,  no murmur, click, rub, or gallop             Dental  No notable dental hx       Pulmonary  Breath sounds clear to auscultation               Abdominal  GI exam deferred       Other Findings            Anesthetic Plan    ASA: 3  Anesthesia type: general          Induction: Intravenous  Anesthetic plan and risks discussed with: Patient

## 2020-12-09 NOTE — PROGRESS NOTES
Transfer to [facility] RR from Procedure Area    Verbal report given to [employer]  n on Theoplis Chute being transferred to Cardiac Cath Lab RR for routine post - op   Patient is post EPS/Ablation procedure. Patient stable upon transfer to . Report consisted of patients Situation, Background, Assessment and   Recommendations(SBAR). Information from the following report(s) Procedure Summary, Intake/Output, MAR and Cardiac Rhythm Sinus Tach was reviewed with the receiving nurse. Opportunity for questions and clarification was provided. Patient medicated during procedure with orders obtained and verified by Dr. Asael Coburn. Refer to patient PROCEDURE REPORT for vital signs, assessment, status, and response during procedure.

## 2020-12-09 NOTE — PROGRESS NOTES
TRANSFER - IN REPORT:    Verbal report received from DANAY Yates on Hero Riojas  being received from procedure for routine progression of care. Report consisted of patients Situation, Background, Assessment and Recommendations(SBAR). Information from the following report(s) Procedure Summary was reviewed with the receiving clinician. Opportunity for questions and clarification was provided. Assessment completed upon patients arrival to [address] [place] [address]. Cardiac Cath Lab Recovery Arrival Note:    Hero Riojas arrived to [facility] recovery area. Patient procedure= electrophysiology study. Patient on cardiac monitor, non-invasive blood pressure, SPO2 monitor. On room air. IV  of 0.9% normal saline on pump at 25 ml/hr. Patient status doing well without problems. Patient is A&Ox 4. Patient reports no complaints. PROCEDURE SITE CHECK:    Procedure site:without any bleeding or hematoma, no pain/discomfort reported at procedure site. No change in patient status. Continue to monitor patient and status.

## 2020-12-09 NOTE — PROGRESS NOTES
Discharge instructions reviewed with the patient; verbalized understanding and written copy of instructions given to the patient. Patient assisted to ambulate to bathroom to void; exhibits steady gait. Right groin site remains dry & intact. Patient changed into clothing from home. Peripheral IV removed and dressing placed to site. Patient discharged via wheelchair in the care of her mother.

## 2020-12-09 NOTE — PROGRESS NOTES
Cardiac Cath Lab Recovery Arrival Note:      Kori Wilder arrived to Cardiac Cath Lab, Recovery Area. Staff introduced to patient. Patient identifiers verified with NAME and DATE OF BIRTH. Procedure verified with patient. Consent forms reviewed and signed by patient or authorized representative and verified. Allergies verified. Patient and family oriented to department. Patient and family informed of procedure and plan of care. Questions answered with review. Patient prepped for procedure, per orders from physician, prior to arrival.    Patient on cardiac monitor, non-invasive blood pressure, SPO2 monitor. On room air. Patient is A&Ox 4. Patient reports no complaints. Patient in stretcher, in low position, with side rails up, call bell within reach, patient instructed to call if assistance as needed. Patient prep in: [address], [place] [address]. Family in: Ms. Magda Whitaker (Mom) [phone].    Prep by: Stella Mcadams RN

## 2020-12-10 ENCOUNTER — TELEPHONE (OUTPATIENT)
Dept: CARDIOLOGY CLINIC | Age: 40
End: 2020-12-10

## 2020-12-10 NOTE — TELEPHONE ENCOUNTER
Patient would like to know if it is possible to rescheduled her upcoming appointment on 12/29 to a later time in the day. Please advise. Patient states that she would like to speak with a nurse as she was given instructions following her procedure but she was still slightly sedated and doesn't remember and would like to go over the instructions again if possible.  Thanks     Phone: [phone]

## 2020-12-10 NOTE — TELEPHONE ENCOUNTER
Verified patient with two types of identifiers. Reviewed post procedure instructions with jennifer. Patient states she will keep appointment scheduled as is. Verified patient restarted her Lopressor and Diltiazem and stopped her Losartan. Patient verbalized understanding and will call with any other questions.       Future Appointments   Date Time Provider Bassem Jack   12/29/2020  9:00 AM MD MARIO Frankel BS AMB   1/6/2021 12:30 PM Diana Beebe MD [facility] BS AMB   3/24/2021  1:40 PM MD MARIO Rocha BS AMB

## 2020-12-29 ENCOUNTER — OFFICE VISIT (OUTPATIENT)
Dept: CARDIOLOGY CLINIC | Age: 40
End: 2020-12-29
Payer: COMMERCIAL

## 2020-12-29 VITALS
DIASTOLIC BLOOD PRESSURE: 60 MMHG | HEART RATE: 60 BPM | OXYGEN SATURATION: 97 % | SYSTOLIC BLOOD PRESSURE: 130 MMHG | WEIGHT: 253 LBS | BODY MASS INDEX: 42.15 KG/M2 | HEIGHT: 65 IN | RESPIRATION RATE: 18 BRPM

## 2020-12-29 DIAGNOSIS — Z86.79 S/P ABLATION OF ATRIAL FLUTTER: ICD-10-CM

## 2020-12-29 DIAGNOSIS — I47.1 PSVT (PAROXYSMAL SUPRAVENTRICULAR TACHYCARDIA) (HCC): Primary | ICD-10-CM

## 2020-12-29 DIAGNOSIS — Q26.1 PERSISTENT LEFT SVC (SUPERIOR VENA CAVA): ICD-10-CM

## 2020-12-29 DIAGNOSIS — I47.1 PAT (PAROXYSMAL ATRIAL TACHYCARDIA) (HCC): ICD-10-CM

## 2020-12-29 DIAGNOSIS — Z98.890 S/P ABLATION OF ATRIAL FLUTTER: ICD-10-CM

## 2020-12-29 DIAGNOSIS — E66.9 OBESITY (BMI 30-39.9): ICD-10-CM

## 2020-12-29 DIAGNOSIS — I15.9 HYPERTENSION, SECONDARY: ICD-10-CM

## 2020-12-29 DIAGNOSIS — I47.1 AVNRT (AV NODAL RE-ENTRY TACHYCARDIA) (HCC): ICD-10-CM

## 2020-12-29 DIAGNOSIS — Q25.1 AORTA COARCTATION: ICD-10-CM

## 2020-12-29 DIAGNOSIS — Q21.10 RESIDUAL ASD (ATRIAL SEPTAL DEFECT) FOLLOWING REPAIR: ICD-10-CM

## 2020-12-29 PROCEDURE — 93000 ELECTROCARDIOGRAM COMPLETE: CPT | Performed by: INTERNAL MEDICINE

## 2020-12-29 PROCEDURE — 99214 OFFICE O/P EST MOD 30 MIN: CPT | Performed by: INTERNAL MEDICINE

## 2020-12-29 RX ORDER — CHOLECALCIFEROL (VITAMIN D3) 125 MCG
100 CAPSULE ORAL DAILY
COMMUNITY

## 2020-12-29 RX ORDER — ASCORBIC ACID 500 MG
TABLET ORAL
COMMUNITY

## 2020-12-29 NOTE — PROGRESS NOTES
Cardiac Electrophysiology OFFICE Note     Subjective: Juany Ryan is a 36 y.o. patient who presents for follow up s/p EPS & ablation of right anterolateral lorena-tricuspid valve annulus AT, AVNRT, & typical atrial flutter on 12/09/2020. At the end of procedure, there was only nonsustained atrial tach. Still with coarctation of the aorta. bp is controlled now  She wants to lose weight  Feels well    Continues diltiazem & metoprolol post ablation. Of note, she did have abdominal CTA & MRI of the abodmen that showed innumerable hepatic masses within a background of cirrhosis. Some of the masses have arterial enhancement compatible with [address] in segments 5 & 6. Other hepatic masses nonspecific. Referred to oncology (Dr. Demetrice Montgomery) & hepatology (Dr. Dale Nelson), has appointment pending with one of them (unsure which one) on 01/06/2021. [id number] (female, HTN), not on [address] [place] [address] Road to avoid bleeding complications. Previous:  S/p ablation of right anterolateral lorena-tricuspid valve annulus AT, AVNRT, & typical atrial flutter (12/09/2020). Borderline coarctation of the aorta & persistent left SVC. ASD closure at age 11. Primary cardiologist is Dr. Gurjit Hayes.    No family history of sudden cardiac death.          Patient Active Problem List   Diagnosis Code    Obesity (BMI 30-39. 9) E66.9    HTN (hypertension) I10    ASD (atrial septal defect) repair Q21.1    Aorta coarctation Q25.1    Paroxysmal SVT (supraventricular tachycardia) (HCC) I47.1    Severe obesity (HCC) E66.01     Current Outpatient Medications   Medication Sig Dispense Refill    ascorbic acid, vitamin C, (Vitamin C) 500 mg tablet Take  by mouth.  collagen, bovine, 100 % pwpk by Apply Externally route.  vitamin B complex (B COMPLEX-100 PO) Take  by mouth.  co-enzyme Q-10 (Co Q-10) 100 mg capsule Take 100 mg by mouth daily.  dilTIAZem ER (CARDIZEM CD) 120 mg capsule Take 1 Cap by mouth daily. 30 Cap 5    metoprolol tartrate (LOPRESSOR) 100 mg IR tablet TAKE ONE TABLET BY MOUTH TWICE A DAY 60 Tab 5    albuterol (PROVENTIL HFA, VENTOLIN HFA, PROAIR HFA) 90 mcg/actuation inhaler Take 1 Puff by inhalation every four (4) hours as needed for Wheezing. 1 Inhaler 3    MULTIVITS,CA,MINERALS/IRON/FA (ONE-A-DAY WOMENS FORMULA PO) Take  by mouth.  Omega-3-DHA-EPA-Fish Oil 1,000 (120-180) mg cap Take 1,000 mg by mouth daily. Allergies   Allergen Reactions    Codeine Unknown (comments)     Patient doesntt recall the reaction    Hydrocodone Other (comments)     She becomes mean     Pcn [Penicillins] Unknown (comments)    Zithromax [Azithromycin] Rash     Past Medical History:   Diagnosis Date    Allergy     Anxiety     Asthma     Congenital heart disease      Past Surgical History:   Procedure Laterality Date    HX DILATION AND CURETTAGE      endometriosis and precervical ca    HX HEENT      tonsillectomy,DNS sx    HX ORTHOPAEDIC      right tibia fx,no sx needed    CO COMPRE ELECTROPHYSIOLOGIC ARRHYTHMIA INDUCTION N/A 12/9/2020    EP STUDY COMPLETE performed by Jane Otero MD at [address] [facility]     [address] ADD ON N/A 12/9/2020    Ablation Svt/Vt Add On performed by Jane Otero MD at [address] [facility] [address] [facility]     Family History   Problem Relation Age of Onset    Hypertension Mother      Social History     Tobacco Use    Smoking status: Light Tobacco Smoker     Packs/day: 0.25     Years: 2.00     Pack years: 0.50     Types: Cigarettes    Smokeless tobacco: Never Used    Tobacco comment: 1 pack in 3 weeks    Substance Use Topics    Alcohol use: Not Currently     Alcohol/week: 0.0 standard drinks     Comment: socially        Review of Systems: All other review of systems otherwise negative. Constitutional: Negative for fever, chills, weight loss, malaise/fatigue. HEENT: Negative for nosebleeds, vision changes.    Respiratory: Negative for cough, hemoptysis. Cardiovascular: Negative for chest pain, no orthopnea, claudication, leg swelling, syncope, and PND. Gastrointestinal: Negative for nausea, vomiting, diarrhea, blood in stool and melena. Genitourinary: Negative for dysuria, and hematuria. Musculoskeletal: Negative for myalgias, arthralgia. Skin: Negative for rash. Heme: Does not bleed or bruise easily. Neurological: Negative for speech change and focal weakness. Objective:     Visit Vitals  /60 (BP 1 Location: Left arm, BP Patient Position: Sitting)   Resp 18   Ht 5' 5\" (1.651 m)   Wt 253 lb (114.8 kg)   BMI 42.10 kg/m²        Physical Exam:   Constitutional: Well-developed and well-nourished. No respiratory distress. Head: Normocephalic and atraumatic. Eyes: Pupils are equal, round. ENT: Hearing grossly normal.  Neck: supple. No JVD present. Cardiovascular: Normal rate, regular rhythm. Exam reveals no gallop and no friction rub. 2/6 systolic murmur. Pulmonary/Chest: Effort normal and breath sounds normal. No wheezes. Abdominal: Soft, no tenderness. Obese. Musculoskeletal: No edema. Neurological: Alert, oriented. Skin: Skin is warm and dry. Psychiatric: Normal mood and affect. Behavior is normal. Judgment and thought content normal.         Imaging/Studies:  Echo (03/31/2020): LVEF 50-55%, mild to mod LVH, no RWMA, grade 2 diastolic dysfunction. Severely dilated LA, s/p prior ASD repair with no evidence of residual ASD. Mild MR. Dilated ascending aorta, diameter 4 cm; possibility of aortic coarct cannot be excluded. Cardiac MRI (10/11/2016): Persistent left SVC, courses between INEZ & left superior PV, continues caudally to join CS. CS mildly dilated. Appears that there is a patch repair of the unroofed CS into the LA. Patch repair is competent, doesn't demonstrate any residual shunting between the CS & LA.  CS continues its normal course, drains into RA. Interatrial septum is intact without any shunting. All pulmonary veins drain to LA without any sinus venosus defect. Possible old spontaneously healed small muscular VSD at distal apical septal wall, no residual shunting. Bicuspid aortic valve, no AS, trace AR. Very prominent eustachian valve in LA. Focal mild coarctation of the aorta between the aortic arch & prox descending thoracic aorta . Coarctation measures 14 mm in diameter. Flow turbulence, but no significant gradient across coarctation. Distal to coarctation, aortic dilatation; prox descending thoracic aorta measures 28 mm, tapers down to normal dimension at mid & distal level. Proximally, aortic arch mildly dilated, 29 mm. Ascending aorta dilated, measures 43 x 38 mm in mid section. LVEF 65%. RVEF 60%. Exercise stress test (09/21/2016): ECG WNL at rest & with stress. Occasional PVCs. Good exercise tolerance, no angina with exercise. Assessment/Plan:       ICD-10-CM ICD-9-CM    1. PSVT (paroxysmal supraventricular tachycardia) ([facility])  I47.1 427.0    2. ASD (atrial septal defect) repair  Q21.1 745.5    3. Aorta coarctation  Q25.1 747.10    4. Persistent left SVC (superior vena cava)  Q26.1 747.49    5. AVNRT (AV lázaro re-entry tachycardia) ([facility]  I47.1 427.89    6. PAT (paroxysmal atrial tachycardia) ([facility])  I47.1 427.0    7. S/P ablation of atrial flutter  Z98.890 V45.89     Z86.79          PSVT/typical atrial flutter: S/p ablation of right anterolateral lorena-tricuspid valve annulus AT, AVNRT, & typical atrial flutter. This controls most of palpitation   May still have some nonsustained atrial tachycardia  Continue metoprolol & diltiazem as previously prescribed and these are for bp control    HTN: BP well controlled on current metoprolol & diltiazem. I recommend her see Dr Jesse Maxwell and [facility] for coarctation of aorta  She wants to think about it    Hepatic masses: Pending appointments with Dr. Viviane Ashford.     Congenital heart defects:  Coarctation of aorta, persistent SVC still apparent during EPS. S/p ASD repair. Increased risk of arrhythmia associated with structural abnormalities so need to go to [facility] Dr Andre Ortiz and Congenital heart disease clinic  She will decide    Follow up with Dr Luc Gilbert next      Future Appointments   Date Time Provider Bassem Jack   1/6/2021 12:30 PM MD YANN Martin BS AMB   3/24/2021  1:40 PM MD MARIO Stephenson AMB       Thank you for involving me in this patient's care and please call with further concerns or questions. Joseph Grigsby M.D.   Electrophysiology/Cardiology  [facility] and [facility] [place]  [address] [place] [address]                             [phone]

## 2021-01-06 ENCOUNTER — OFFICE VISIT (OUTPATIENT)
Dept: HEMATOLOGY | Age: 41
End: 2021-01-06
Payer: COMMERCIAL

## 2021-01-06 VITALS
WEIGHT: 251.32 LBS | DIASTOLIC BLOOD PRESSURE: 90 MMHG | SYSTOLIC BLOOD PRESSURE: 157 MMHG | RESPIRATION RATE: 17 BRPM | BODY MASS INDEX: 41.87 KG/M2 | HEART RATE: 58 BPM | TEMPERATURE: 96.4 F | OXYGEN SATURATION: 96 % | HEIGHT: 65 IN

## 2021-01-06 DIAGNOSIS — R16.0 LIVER MASSES: ICD-10-CM

## 2021-01-06 DIAGNOSIS — K74.60 CIRRHOSIS OF LIVER WITHOUT ASCITES, UNSPECIFIED HEPATIC CIRRHOSIS TYPE (HCC): Primary | ICD-10-CM

## 2021-01-06 PROBLEM — C22.0 HEPATOCELLULAR CARCINOMA (HCC): Status: ACTIVE | Noted: 2021-01-06

## 2021-01-06 PROCEDURE — 99204 OFFICE O/P NEW MOD 45 MIN: CPT | Performed by: HOSPITALIST

## 2021-01-06 NOTE — LETTER
NOTIFICATION RETURN TO WORK / SCHOOL 
 
1/6/2021 2:10 PM 
 
Ms. Luis A Espinoza [address] [id number] To Whom It May Concern: 
 
Luis A Garza is currently under the care of [address]. She will return to work/school on: Friday January 8, 2021 If there are questions or concerns please have the patient contact our office. Sincerely, Christi Valencia MD

## 2021-01-06 NOTE — PROGRESS NOTES
[facility] [address]      Rayshawn Garibay MD, Nilda Gibson, Celine Hancock MD, MPH      Bruna Nogueira, PA-GERARD Pope, [facility]     Becka Dick, [facility]   Shannon Abernathy, [facility]    Ubaldomaryann Deepak, [facility]       Louie Muse Mika De [address]utton [address]    at [address], [address] [place] [address]    [address] [facility] [address].    [phone]    FAX: [address] [place] [address], [address]    [phone]    FAX: [phone]     Patient Care Team:  None as PCP - General Reggie Hammond MD (Cardiology)  Candida López MD (Cardiology)    Problem List  Date Reviewed: 12/29/2020          Codes Class Noted    Hepatocellular carcinoma ([facility] ICD-10-CM: C22.0  ICD-9-CM: 155.0  1/6/2021        Cirrhosis of liver without ascites ([facility] ICD-10-CM: K74.60  ICD-9-CM: 571.5  1/6/2021        Severe obesity ([facility] ICD-10-CM: E66.01  ICD-9-CM: 278.01  2/11/2019        ASD (atrial septal defect) repair ICD-10-CM: Q21.1  ICD-9-CM: 745.5  7/26/2017        Aorta coarctation ICD-10-CM: Q25.1  ICD-9-CM: 747.10  7/26/2017        Paroxysmal SVT (supraventricular tachycardia) ([facility] ICD-10-CM: I47.1  ICD-9-CM: 427.0  7/26/2017    Overview Signed 12/9/2020 12:14 PM by Candida López MD     12/9/2020 EP study and ablation of right atrial tachycardia and atrial flutter              HTN (hypertension) ICD-10-CM: I10  ICD-9-CM: 401.9  7/29/2014          The clinicians listed above have asked me to see Rito Calleser in consultation regarding cirrhosis complicated by hepatocellular carcinoma.     All medical records sent by the referring physicians were reviewed including imaging studies     The patient is a 36 y.o.  old  female without prior history of cirrhosis or chronic liver disease    Patient initially had a CTA of the chest performed in 09/2020 to evaluate possible coarctation of the aorta but this incidentally noted multiple hepatic masses with irregular internal enhancement with the largest measuring 5 cm. Patient subsequently underwent an abdominal MRI with and without contrast on 11/2020. This demonstrates innumerable hepatic masses within a  background of cirrhosis. Some of the  masses have arterial enhancement compatible with hepatocellular carcinoma in segments 5 and 6. Other hepatic masses are nonspecific. 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 No significant portal vein flow to the liver. Serologic evaluation for markers of chronic liver disease has either not been performed or the results are not available. The patient has not developed any of the major complications of cirrhosis to date. The patient has no symptoms which can be attributed to the liver disorder. The patient is not currently experiencing the following symptoms of liver disease:  fatigue, yellowing of the eyes or skin, problems concentrating, swelling of the abdomen,   swelling of the lower extremities, hematemesis, hematochezia. The patient completes all daily activities without any functional limitations. ASSESSMENT AND PLAN:  Cirrhosis  The diagnosis of cirrhosis is based upon imaging, laboratory studies and complications of cirrhosis. Cirrhosis is of unclear etiology. AST is elevated, ALT is normal, ALP is elevated, liver function is normal, the platelet count is normal     We are not sure if patient has passive hepatic congestion due to long standing history of cardiac disorder including possible coarctation of the aorta  The most recent echo performed in 03/2020 demonstrated normal Tricuspid Valve structure but shows mild to moderate pulmonary hypertension. Long standing hepatic congestion from right heart failure can lead to cirrhosis.     For now we will perform laboratory studies. This will include CBC, CMP, PT/INR    We will perform serologic studies to evaluate for other causes of chronic liver disease. We may benefit from right heart cath for right heart pressure measurements to rule out right heart failure      Liver masses  The diagnosis is made by MRI with contrast of the abdomen. Imaging shows innumerable hepatic masses and possible HCC in segment 5 and 6    We will perform cancer serologic markers. This will include AFP    We will most likely perform biopsy of the liver mass in segment 5 & 6 to confirm [address] and also liver biopsy to confirm cirrhosis    Screening for Esophageal varices   The patient has not had an EGD to screen for varices. We will defer EGD for now    Hepatic encephalopathy   Overt HE has not developed to date. There is no need for treatment with lactulose and/or Xifaxan at this time. Treatment of other medical problems in patients with chronic liver disease  There are no contraindications for the patient to take most medications that are necessary for treatment of other medical issues. The patient does not consume alcohol on a daily basis. Vaccinations   The need for vaccination against viral hepatitis A and B will be assessed with serologic and instituted as appropriate. Routine vaccinations against other bacterial and viral agents can be performed as indicated. Annual flu vaccination should be administered if indicated. ALLERGIES  Allergies   Allergen Reactions    Codeine Unknown (comments)     Patient doesntt recall the reaction    Hydrocodone Other (comments)     She becomes mean     Pcn [Penicillins] Unknown (comments)    Zithromax [Azithromycin] Rash       MEDICATIONS  Current Outpatient Medications   Medication Sig    ascorbic acid, vitamin C, (Vitamin C) 500 mg tablet Take  by mouth.  collagen, bovine, 100 % pwpk by Apply Externally route.  vitamin B complex (B COMPLEX-100 PO) Take  by mouth.     co-enzyme Q-10 (Co Q-10) 100 mg capsule Take 100 mg by mouth daily.  dilTIAZem ER (CARDIZEM CD) 120 mg capsule Take 1 Cap by mouth daily.  metoprolol tartrate (LOPRESSOR) 100 mg IR tablet TAKE ONE TABLET BY MOUTH TWICE A DAY    albuterol (PROVENTIL HFA, VENTOLIN HFA, PROAIR HFA) 90 mcg/actuation inhaler Take 1 Puff by inhalation every four (4) hours as needed for Wheezing.  MULTIVITS,CA,MINERALS/IRON/FA (ONE-A-DAY WOMENS FORMULA PO) Take  by mouth.  Omega-3-DHA-EPA-Fish Oil 1,000 (120-180) mg cap Take 1,000 mg by mouth daily. No current facility-administered medications for this visit. SYSTEM REVIEW NOT RELATED TO LIVER DISEASE OR REVIEWED ABOVE:  Constitution systems: Negative for fever, chills, weight gain, weight loss. Eyes: Negative for visual changes. ENT: Negative for sore throat, painful swallowing. Respiratory: Negative for cough, hemoptysis, SOB. Cardiology: Negative for chest pain, palpitations. GI:  Negative for constipation or diarrhea. : Negative for urinary frequency, dysuria, hematuria, nocturia. Skin: Negative for rash. Hematology: Negative for easy bruising, blood clots. Musculo-skelatal: Negative for back pain, muscle pain, weakness. Neurologic: Negative for headaches, dizziness, vertigo, memory problems not related to HE. Psychology: Negative for anxiety, depression. FAMILY HISTORY:  The father  Has/had the following following chronic disease(s): Colon cancer  The mother Has/had the following chronic disease(s): HTN, chronic back pain  There is no family history of liver disease. There is no family history of immune disorders. SOCIAL HISTORY:  The patient is . The patient has no children. The patient smoke occasionally usually once per week  Patient has a history of heavy alcohol use for about 6 months in 2010.  The patient drinks alcohol occasionally  The patient currently works full time in a [employer]:  Visit Vitals  BP (!) 157/90 (BP 1 Location: Left arm, BP Patient Position: Sitting)   Pulse (!) 58   Temp (!) 96.4 °F (35.8 °C) (Temporal)   Resp 17   Ht 5' 5\" (1.651 m)   Wt 251 lb 5.2 oz (114 kg)   LMP 12/04/2020 (Exact Date)   SpO2 96%   BMI 41.82 kg/m²     General: No acute distress. Eyes: Sclera anicteric. ENT: No oral lesions. Thyroid normal.  Nodes: No adenopathy. Skin: No spider angiomata. No jaundice. No palmar erythema. Respiratory: Lungs clear to auscultation. Cardiovascular: Regular heart rate. No murmurs. No JVD. Abdomen: Soft non-tender, liver size normal to percussion/palpation. Spleen not palpable. No obvious ascites. Extremities: No edema. No muscle wasting. No gross arthritic changes. Neurologic: Alert and oriented. Cranial nerves grossly intact. No asterixis. PHYSICAL EXAMINATION:  Visit Vitals  BP (!) 157/90 (BP 1 Location: Left arm, BP Patient Position: Sitting)   Pulse (!) 58   Temp (!) 96.4 °F (35.8 °C) (Temporal)   Resp 17   Ht 5' 5\" (1.651 m)   Wt 251 lb 5.2 oz (114 kg)   LMP 12/04/2020 (Exact Date)   SpO2 96%   BMI 41.82 kg/m²     General: No acute distress. Eyes: Sclera anicteric. ENT: No oral lesions. Thyroid normal.  Nodes: No adenopathy. Skin: No spider angiomata. No jaundice. No palmar erythema. Respiratory: Lungs clear to auscultation. Cardiovascular: Regular heart rate. No murmurs. No JVD. Abdomen: Soft non-tender. Liver size normal to percussion/palpation. Spleen not palpable. No obvious ascites. Extremities: No edema. No muscle wasting. No gross arthritic changes. Neurologic: Alert and oriented. Cranial nerves grossly intact. No asterixis. LABORATORY STUDIES:  Recent liver function panel, CBC with platelet count and BMP are not available. These studies will be performed.   Liver [place] of [address] Units 11/10/2020 4/3/2017   WBC 3.4 - 10.8 x10E3/uL 6.7 6.9   ANC 1.4 - 7.0 x10E3/uL 4.0 4.1   HGB 11.1 - 15.9 g/dL 15.0 15.1   PLT 150 - 450 x10E3/uL 294 318   AST 0 - 40 IU/L  45 (H)   ALT 0 - 32 IU/L  26   Alk Phos 39 - 117 IU/L  152 (H)   Bili, Total 0.0 - 1.2 mg/dL  0.5   Albumin 3.5 - 5.5 g/dL  3.9   BUN 6 - 24 mg/dL 11 10   Creat 0.57 - 1.00 mg/dL 0.72 0.63   Na 134 - 144 mmol/L 141 141   K 3.5 - 5.2 mmol/L 4.2 4.3   Cl 96 - 106 mmol/L 105 102   CO2 20 - 29 mmol/L 25 27   Glucose 65 - 99 mg/dL 103 (H) 83       SEROLOGIES:  Not available or performed. Testing will be performed as indicated. LIVER HISTOLOGY:  Not available or performed    ENDOSCOPIC PROCEDURES:  Not available or performed    RADIOLOGY:  11/2020: Abdominal MRI: This demonstrates innumerable hepatic masses within a background of cirrhosis. Some of the  masses have arterial enhancement compatible with hepatocellular carcinoma in segments 5 and 6. Other hepatic masses are nonspecific. 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 No significant portal vein flow to the liver. 09/2020: CTA of the chest: multiple hepatic masses with irregular internal enhancement with the largest measuring 5 cm. OTHER TESTING:  Not available or performed    FOLLOW-UP:  All of the issues listed above in the Assessment and Plan were discussed with the patient. All questions were answered. The patient expressed a clear understanding of the above. [address] [place] [address] 87 in 2 weeks to review all data and determine the treatment plan.     Rajni Mota MD, MPH  Advanced Hepatology  [facility] of [address] [address] [address], [address] [facility] [address].  [phone]  [address] [facility]

## 2021-01-06 NOTE — PROGRESS NOTES
Room 2    Identified pt with two pt identifiers(name and ). Reviewed record in preparation for visit and have obtained necessary documentation. All patient medications has been reviewed. Chief Complaint   Patient presents with   [address]       3 most recent PHQ Screens 2021   Little interest or pleasure in doing things Not at all   Feeling down, depressed, irritable, or hopeless Not at all   Total Score PHQ 2 0     Abuse Screening Questionnaire 5/15/2014   Do you ever feel afraid of your partner? N   Are you in a relationship with someone who physically or mentally threatens you? N   Is it safe for you to go home? Y       Health Maintenance Due   Topic    Pneumococcal 0-64 years (1 of 1 - PPSV23)    DTaP/Tdap/Td series (1 - Tdap)    PAP AKA CERVICAL CYTOLOGY     Flu Vaccine (1)     Health Maintenance Review: Patient reminded of \"due or due soon\" health maintenance. I have asked the patient to contact his/her primary care provider (PCP) for follow-up on his/her health maintenance. There were no vitals filed for this visit. Wt Readings from Last 3 Encounters:   20 253 lb (114.8 kg)   20 255 lb (115.7 kg)   20 250 lb (113.4 kg)     Temp Readings from Last 3 Encounters:   20 97.9 °F (36.6 °C)   19 98 °F (36.7 °C)   17 98.5 °F (36.9 °C)     BP Readings from Last 3 Encounters:   20 130/60   20 (!) 128/90   11/10/20 128/78     Pulse Readings from Last 3 Encounters:   20 60   20 (!) 111   11/10/20 62       Coordination of Care Questionnaire:   1) Have you been to an emergency room, urgent care, or hospitalized since your last visit? Yes   2. Have seen or consulted any other health care provider since your last visit?   No

## 2021-01-08 LAB
A1AT SERPL-MCNC: 138 MG/DL (ref 100–188)
AFP L3 MFR SERPL: NORMAL % (ref 0–9.9)
AFP SERPL-MCNC: 1.5 NG/ML (ref 0–8)
ANA TITR SER IF: NEGATIVE {TITER}
CERULOPLASMIN SERPL-MCNC: 27.4 MG/DL (ref 19–39)
COMMENT, 144067: NORMAL
HAV AB SER QL IA: NEGATIVE
HBV CORE AB SERPL QL IA: NEGATIVE
HCV AB S/CO SERPL IA: <0.1 S/CO RATIO (ref 0–0.9)

## 2021-01-10 LAB
ACTIN IGG SERPL-ACNC: 14 UNITS (ref 0–19)
ALBUMIN SERPL-MCNC: 3.5 G/DL (ref 3.5–5)
ALBUMIN/GLOB SERPL: 1 {RATIO} (ref 1.1–2.2)
ALP SERPL-CCNC: 183 U/L (ref 45–117)
ALT SERPL-CCNC: 51 U/L (ref 12–78)
ANION GAP SERPL CALC-SCNC: 1 MMOL/L (ref 5–15)
AST SERPL-CCNC: 59 U/L (ref 15–37)
BASOPHILS # BLD: 0.1 K/UL (ref 0–0.1)
BASOPHILS NFR BLD: 1 % (ref 0–1)
BILIRUB SERPL-MCNC: 0.8 MG/DL (ref 0.2–1)
BUN SERPL-MCNC: 13 MG/DL (ref 6–20)
BUN/CREAT SERPL: 16 (ref 12–20)
CALCIUM SERPL-MCNC: 9.2 MG/DL (ref 8.5–10.1)
CHLORIDE SERPL-SCNC: 104 MMOL/L (ref 97–108)
CO2 SERPL-SCNC: 33 MMOL/L (ref 21–32)
CREAT SERPL-MCNC: 0.81 MG/DL (ref 0.55–1.02)
DIFFERENTIAL METHOD BLD: ABNORMAL
EOSINOPHIL # BLD: 0.2 K/UL (ref 0–0.4)
EOSINOPHIL NFR BLD: 3 % (ref 0–7)
ERYTHROCYTE [DISTWIDTH] IN BLOOD BY AUTOMATED COUNT: 13.9 % (ref 11.5–14.5)
FERRITIN SERPL-MCNC: 88 NG/ML (ref 8–252)
GLOBULIN SER CALC-MCNC: 3.5 G/DL (ref 2–4)
GLUCOSE SERPL-MCNC: 93 MG/DL (ref 65–100)
HBV SURFACE AB SER QL: NONREACTIVE
HBV SURFACE AB SER-ACNC: <3.1 MIU/ML
HBV SURFACE AG SER QL: <0.1 INDEX
HBV SURFACE AG SER QL: NEGATIVE
HCT VFR BLD AUTO: 48.9 % (ref 35–47)
HGB BLD-MCNC: 15.4 G/DL (ref 11.5–16)
HIV 1+2 AB+HIV1 P24 AG SERPL QL IA: NONREACTIVE
HIV12 RESULT COMMENT, HHIVC: NORMAL
IMM GRANULOCYTES # BLD AUTO: 0 K/UL (ref 0–0.04)
IMM GRANULOCYTES NFR BLD AUTO: 0 % (ref 0–0.5)
INR PPP: 1 (ref 0.9–1.1)
IRON SATN MFR SERPL: 39 % (ref 20–50)
IRON SERPL-MCNC: 104 UG/DL (ref 35–150)
LYMPHOCYTES # BLD: 1.7 K/UL (ref 0.8–3.5)
LYMPHOCYTES NFR BLD: 19 % (ref 12–49)
MCH RBC QN AUTO: 30 PG (ref 26–34)
MCHC RBC AUTO-ENTMCNC: 31.5 G/DL (ref 30–36.5)
MCV RBC AUTO: 95.3 FL (ref 80–99)
MITOCHONDRIA M2 IGG SER-ACNC: <20 UNITS (ref 0–20)
MONOCYTES # BLD: 1.1 K/UL (ref 0–1)
MONOCYTES NFR BLD: 12 % (ref 5–13)
NEUTS SEG # BLD: 5.8 K/UL (ref 1.8–8)
NEUTS SEG NFR BLD: 65 % (ref 32–75)
NRBC # BLD: 0 K/UL (ref 0–0.01)
NRBC BLD-RTO: 0 PER 100 WBC
PLATELET # BLD AUTO: 286 K/UL (ref 150–400)
PMV BLD AUTO: 11.9 FL (ref 8.9–12.9)
POTASSIUM SERPL-SCNC: 4.3 MMOL/L (ref 3.5–5.1)
PROT SERPL-MCNC: 7 G/DL (ref 6.4–8.2)
PROTHROMBIN TIME: 10.7 SEC (ref 9–11.1)
RBC # BLD AUTO: 5.13 M/UL (ref 3.8–5.2)
SODIUM SERPL-SCNC: 138 MMOL/L (ref 136–145)
TIBC SERPL-MCNC: 269 UG/DL (ref 250–450)
WBC # BLD AUTO: 8.9 K/UL (ref 3.6–11)

## 2021-01-13 ENCOUNTER — OFFICE VISIT (OUTPATIENT)
Dept: HEMATOLOGY | Age: 41
End: 2021-01-13
Payer: COMMERCIAL

## 2021-01-13 VITALS
TEMPERATURE: 96.3 F | RESPIRATION RATE: 18 BRPM | DIASTOLIC BLOOD PRESSURE: 93 MMHG | HEART RATE: 55 BPM | SYSTOLIC BLOOD PRESSURE: 149 MMHG | OXYGEN SATURATION: 98 % | BODY MASS INDEX: 42.05 KG/M2 | HEIGHT: 65 IN | WEIGHT: 252.4 LBS

## 2021-01-13 DIAGNOSIS — R16.0 LIVER MASS: Primary | ICD-10-CM

## 2021-01-13 PROCEDURE — 99215 OFFICE O/P EST HI 40 MIN: CPT | Performed by: HOSPITALIST

## 2021-01-13 NOTE — PROGRESS NOTES
Hali Tapia [address]      Valma Dakins, MD, Lorrayne Closs, Hyacinth Streeter MD, MPH      Traci Villar, PACYN Wilder, [facility]     April S Mayelin, [facility]   Karla Cassidy, P-C    Judge Cuevas, [facility]       Louie Muse M[address]    at [address], [address] [place] [address], [address]    [phone]    FAX: [address]    [facility]    [address], [address]    [address] [facility], [address]    [phone]    FAX: [phone]     Patient Care Team:  None as PCP - Sheila Russo MD (Cardiology)  Lopez Borjas MD (Cardiology)    Problem List  Date Reviewed: 1/7/2021          Codes Class Noted    Hepatocellular carcinoma ([facility] ICD-10-CM: C22.0  ICD-9-CM: 155.0  1/6/2021        Cirrhosis of liver without ascites ([facility] ICD-10-CM: K74.60  ICD-9-CM: 571.5  1/6/2021        Severe obesity ([facility] ICD-10-CM: E66.01  ICD-9-CM: 278.01  2/11/2019        ASD (atrial septal defect) repair ICD-10-CM: Q21.1  ICD-9-CM: 745.5  7/26/2017        Aorta coarctation ICD-10-CM: Q25.1  ICD-9-CM: 747.10  7/26/2017        Paroxysmal SVT (supraventricular tachycardia) ([facility] ICD-10-CM: I47.1  ICD-9-CM: 427.0  7/26/2017    Overview Signed 12/9/2020 12:14 PM by Lopez Borjas MD     12/9/2020 EP study and ablation of right atrial tachycardia and atrial flutter              HTN (hypertension) ICD-10-CM: I10  ICD-9-CM: 401.9  7/29/2014           Lizzeth Licona returns to the liver institute of [facility] for follow up and management of liver mass  All medical records sent by the referring physicians were reviewed including imaging studies     The patient is a 36 y.o.  old  female without prior history of cirrhosis or chronic liver disease    Patient initially had a CTA of the chest performed in 09/2020 to evaluate possible coarctation of the aorta but this incidentally noted multiple hepatic masses with irregular internal enhancement with the largest measuring 5 cm. Patient subsequently underwent an abdominal MRI with and without contrast on 11/2020. This demonstrates innumerable hepatic masses within a  background of cirrhosis. Some of the  masses have arterial enhancement compatible with hepatocellular carcinoma in segments 5 and 6. Other hepatic masses are nonspecific. 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 No significant portal vein flow to the liver. Serologic evaluation for markers of chronic liver disease performed were all negative. AFP was normal at 1.5    The patient has not developed any of the major complications of cirrhosis to date. The patient has no symptoms which can be attributed to the liver disorder. The patient is not currently experiencing the following symptoms of liver disease:  fatigue, yellowing of the eyes or skin, problems concentrating, swelling of the abdomen,   swelling of the lower extremities, hematemesis, hematochezia. The patient completes all daily activities without any functional limitations. ASSESSMENT AND PLAN:  Cirrhosis  The diagnosis of cirrhosis is based upon imaging, laboratory studies and complications of cirrhosis. AST is elevated, ALT is normal, ALP is elevated, liver function is normal, the platelet count is normal    Serologic markers for causes of chronic liver disease were all normal.    We are not exactly sure if patient has cirrhosis    Coarctation of the aorta is not known to cause passive hepatic congestion     Patient will require right heart catheterization to measure right heart pressures    Patient will also require TJ liver biopsy with hepatic venous pressure measurements to confirm cirrhosis      Liver masses  The diagnosis is made by MRI with contrast of the abdomen.   Imaging shows innumerable hepatic masses and possible HCC in segment 5 and 6  AFP was normal  We will perform image guided liver biopsy of liver mass in segment 5 & 6 to confirm [address] and also to obtain liver biopsy to confirm or rule out cirrhosis. Screening for Esophageal varices   The patient has not had an EGD to screen for varices. We will defer EGD for now    Hepatic encephalopathy   Overt HE has not developed to date. There is no need for treatment with lactulose and/or Xifaxan at this time. Treatment of other medical problems in patients with chronic liver disease  There are no contraindications for the patient to take most medications that are necessary for treatment of other medical issues. The patient does not consume alcohol on a daily basis. Vaccinations   Vaccination for viral hepatitis A and B is recommended since the patient has no serologic evidence of previous exposure or vaccination with immunity. Routine vaccinations against other bacterial and viral agents can be performed as indicated. Annual flu vaccination should be administered if indicated. ALLERGIES  Allergies   Allergen Reactions    Codeine Unknown (comments)     Patient doesntt recall the reaction    Hydrocodone Other (comments)     She becomes mean     Pcn [Penicillins] Unknown (comments)    Zithromax [Azithromycin] Rash       MEDICATIONS  Current Outpatient Medications   Medication Sig    ascorbic acid, vitamin C, (Vitamin C) 500 mg tablet Take  by mouth.  collagen, bovine, 100 % pwpk by Apply Externally route.  vitamin B complex (B COMPLEX-100 PO) Take  by mouth.  co-enzyme Q-10 (Co Q-10) 100 mg capsule Take 100 mg by mouth daily.  dilTIAZem ER (CARDIZEM CD) 120 mg capsule Take 1 Cap by mouth daily.     metoprolol tartrate (LOPRESSOR) 100 mg IR tablet TAKE ONE TABLET BY MOUTH TWICE A DAY    albuterol (PROVENTIL HFA, VENTOLIN HFA, PROAIR HFA) 90 mcg/actuation inhaler Take 1 Puff by inhalation every four (4) hours as needed for Wheezing.  MULTIVITS,CA,MINERALS/IRON/FA (ONE-A-DAY WOMENS FORMULA PO) Take  by mouth.  Omega-3-DHA-EPA-Fish Oil 1,000 (120-180) mg cap Take 1,000 mg by mouth daily. No current facility-administered medications for this visit. SYSTEM REVIEW NOT RELATED TO LIVER DISEASE OR REVIEWED ABOVE:  Constitution systems: Negative for fever, chills, weight gain, weight loss. Eyes: Negative for visual changes. ENT: Negative for sore throat, painful swallowing. Respiratory: Negative for cough, hemoptysis, SOB. Cardiology: Negative for chest pain, palpitations. GI:  Negative for constipation or diarrhea. : Negative for urinary frequency, dysuria, hematuria, nocturia. Skin: Negative for rash. Hematology: Negative for easy bruising, blood clots. Musculo-skelatal: Negative for back pain, muscle pain, weakness. Neurologic: Negative for headaches, dizziness, vertigo, memory problems not related to HE. Psychology: Negative for anxiety, depression. FAMILY HISTORY:  The father  Has/had the following following chronic disease(s): Colon cancer  The mother Has/had the following chronic disease(s): HTN, chronic back pain  There is no family history of liver disease. There is no family history of immune disorders. SOCIAL HISTORY:  The patient is . The patient has no children. The patient smoke occasionally usually once per week  Patient has a history of heavy alcohol use for about 6 months in 2010. The patient drinks alcohol occasionally  The patient currently works full time in a [employer]:  Visit Vitals  BP (!) 149/93 (BP 1 Location: Right arm, BP Patient Position: Sitting)   Pulse (!) 55   Temp (!) 96.3 °F (35.7 °C) (Temporal)   Resp 18   Ht 5' 5\" (1.651 m)   Wt 252 lb 6.4 oz (114.5 kg)   SpO2 98%   BMI 42.00 kg/m²     General: No acute distress. Eyes: Sclera anicteric. ENT: No oral lesions. Thyroid normal.  Nodes: No adenopathy.    Skin: No spider angiomata. No jaundice. No palmar erythema. Respiratory: Lungs clear to auscultation. Cardiovascular: Regular heart rate. No murmurs. No JVD. Abdomen: Soft non-tender, liver size normal to percussion/palpation. Spleen not palpable. No obvious ascites. Extremities: No edema. No muscle wasting. No gross arthritic changes. Neurologic: Alert and oriented. Cranial nerves grossly intact. No asterixis. PHYSICAL EXAMINATION:  Visit Vitals  BP (!) 149/93 (BP 1 Location: Right arm, BP Patient Position: Sitting)   Pulse (!) 55   Temp (!) 96.3 °F (35.7 °C) (Temporal)   Resp 18   Ht 5' 5\" (1.651 m)   Wt 252 lb 6.4 oz (114.5 kg)   SpO2 98%   BMI 42.00 kg/m²     General: No acute distress. Eyes: Sclera anicteric. ENT: No oral lesions. Thyroid normal.  Nodes: No adenopathy. Skin: No spider angiomata. No jaundice. No palmar erythema. Respiratory: Lungs clear to auscultation. Cardiovascular: Regular heart rate. No murmurs. No JVD. Abdomen: Soft non-tender. Liver size normal to percussion/palpation. Spleen not palpable. No obvious ascites. Extremities: No edema. No muscle wasting. No gross arthritic changes. Neurologic: Alert and oriented. Cranial nerves grossly intact. No asterixis. LABORATORY STUDIES:  Recent liver function panel, CBC with platelet count and BMP are not available. These studies will be performed.   Liver [place] of [address] Units 1/6/2021 11/10/2020 4/3/2017   WBC 3.6 - 11.0 K/uL 8.9 6.7 6.9   ANC 1.8 - 8.0 K/UL 5.8 4.0 4.1   HGB 11.5 - 16.0 g/dL 15.4 15.0 15.1    - 400 K/uL 286 294 318   INR 0.9 - 1.1   1.0     AST 15 - 37 U/L 59 (H)  45 (H)   ALT 12 - 78 U/L 51  26   Alk Phos 45 - 117 U/L 183 (H)  152 (H)   Bili, Total 0.2 - 1.0 MG/DL 0.8  0.5   Albumin 3.5 - 5.0 g/dL 3.5  3.9   BUN 6 - 20 MG/DL 13 11 10   Creat 0.55 - 1.02 MG/DL 0.81 0.72 0.63   Na 136 - 145 mmol/L 138 141 141   K 3.5 - 5.1 mmol/L 4.3 4.2 4.3   Cl 97 - 108 mmol/L 104 105 102   CO2 21 - 32 mmol/L 33 (H) 25 27   Glucose 65 - 100 mg/dL 93 103 (H) 83     SEROLOGIES:  Not available or performed.  Testing will be performed as indicated.  Serologies Latest Ref Rng & Units 1/6/2021   Hep A Ab, Total Negative   Negative   Hep B Surface Ag Index <0.10   Hep B Surface Ag Interp Negative   Negative   Hep B Core Ab, Total Negative   Negative   Hep B Surface Ab mIU/mL <3.10   Hep B Surface Ab Interp NONREACTIVE   NONREACTIVE   Hep C Ab 0.0 - 0.9 s/co ratio <0.1   Ferritin 8 - 252 NG/ML 88   Iron % Saturation 20 - 50 % 39   CHANTAL, IFA  Negative   ASMCA 0 - 19 Units 14   M2 Ab 0.0 - 20.0 Units <20.0   Ceruloplasmin 19.0 - 39.0 mg/dL 27.4   Alpha-1 antitrypsin level 100 - 188 mg/dL 138       LIVER HISTOLOGY:  Not available or performed    ENDOSCOPIC PROCEDURES:  Not available or performed    RADIOLOGY:  11/2020: Abdominal MRI: This demonstrates innumerable hepatic masses within a background of cirrhosis. Some of the  masses have arterial enhancement compatible with hepatocellular carcinoma in segments 5 and 6. Other hepatic masses are nonspecific. 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 No significant portal vein flow to the liver.     09/2020: CTA of the chest: multiple hepatic masses with irregular internal enhancement with the largest measuring 5 cm.     OTHER TESTING:  Not available or performed    FOLLOW-UP:  All of the issues listed above in the Assessment and Plan were discussed with the patient.  All questions were answered.  The patient expressed a clear understanding of the above.    Follow-up [facility] 2 weeks after image guided biopsy to determine the treatment plan.    Kj Beebe MD, MPH  Advanced Hepatology  [facility]  [address] [place] [address], [address]  [place]  [address] [phone]  [facility]

## 2021-01-13 NOTE — PROGRESS NOTES
Identified pt with two pt identifiers(name and ). Reviewed record in preparation for visit and have obtained necessary documentation. Chief Complaint   Patient presents with    Cirrhosis Of Liver    Other     Hepatocellular Carcinoma      Vitals:    21 1159   BP: (!) 149/93   Pulse: (!) 55   Resp: 18   Temp: (!) 96.3 °F (35.7 °C)   TempSrc: Temporal   SpO2: 98%   Weight: 252 lb 6.4 oz (114.5 kg)   Height: 5' 5\" (1.651 m)   PainSc:   0 - No pain       Health Maintenance Review: Patient reminded of \"due or due soon\" health maintenance. I have asked the patient to contact his/her primary care provider (PCP) for follow-up on his/her health maintenance. Coordination of Care Questionnaire:  :   1) Have you been to an emergency room, urgent care, or hospitalized since your last visit? If yes, where when, and reason for visit? no       2. Have seen or consulted any other health care provider since your last visit? If yes, where when, and reason for visit? NO      Patient is accompanied by friend I have received verbal consent from Sabi Sanchez to discuss any/all medical information while they are present in the room.

## 2021-01-13 NOTE — LETTER
1/15/2021 Patient: Mikayla Partida YOB: 1980 Date of Visit: 1/13/2021 Pastora Franco MD 
[address] [place] [id number] [address] Dear Pastora Franco MD, Thank you for referring Ms. Roman العلي to [facility] for evaluation. My notes for this consultation are attached. If you have questions, please do not hesitate to call me. I look forward to following your patient along with you. Sincerely, Abdirashid Orozco MD

## 2021-01-14 ENCOUNTER — TELEPHONE (OUTPATIENT)
Dept: HEMATOLOGY | Age: 41
End: 2021-01-14

## 2021-01-14 DIAGNOSIS — R16.0 LIVER MASSES: Primary | ICD-10-CM

## 2021-01-14 DIAGNOSIS — K74.60 CIRRHOSIS OF LIVER WITHOUT ASCITES, UNSPECIFIED HEPATIC CIRRHOSIS TYPE (HCC): ICD-10-CM

## 2021-01-14 NOTE — TELEPHONE ENCOUNTER
Called patient to discuss process for scheduling transjugular liver biopsy, HVPGs, and biopsy of liver masses. Left message asking for a return call. NN name and contact information provided.

## 2021-01-18 ENCOUNTER — TELEPHONE (OUTPATIENT)
Dept: CARDIOLOGY CLINIC | Age: 41
End: 2021-01-18

## 2021-01-18 NOTE — TELEPHONE ENCOUNTER
----- Message from Victor Manuel Ko MD sent at 1/18/2021 12:53 PM EST -----  Yes I don't do any cath so please schedule with Lanette mcnair  ----- Message -----  From: Alexandra Whitaker MD  Sent: 1/18/2021  12:46 PM EST  To: Victor Manuel Ko MD, Hayley Serna, RN    Liver clinic asked for right heart catheterization to measure right heart pressures due to her cirrhosis  Jose, do you want Baylee to schedule with Juan?

## 2021-01-19 NOTE — TELEPHONE ENCOUNTER
Called patient. Two patient indentifiers verified. Attempted to get patient scheduled for RHC. Pt stated that she is scheduled for a liver biopsy next Thursday. Pt stated that she is at work and can not take anymore time off right now. Pt stated that she will call back when she is ready to scheduled RHC.

## 2021-01-20 ENCOUNTER — TELEPHONE (OUTPATIENT)
Dept: CARDIOLOGY CLINIC | Age: 41
End: 2021-01-20

## 2021-01-20 NOTE — TELEPHONE ENCOUNTER
MD Zaheer Cloud RN   Caller: Unspecified (Today, 11:22 AM)             Yes.  This is a good idea and she should get it

## 2021-01-20 NOTE — TELEPHONE ENCOUNTER
Patient would like to know if it would be safe for her to get COVID vaccine given her heart condition. Please advise. Phone: [phone] Ok to leave .

## 2021-01-20 NOTE — TELEPHONE ENCOUNTER
Returned call to patient. Two patient indentifiers verified. Pt was informed of message. Pt verbalized understanding and denies any further questions.      Future Appointments   Date Time Provider Bassem Marcie   1/28/2021  7:30 AM Ryder Beebe MD [facility] BS AMB   2/11/2021 11:30 AM Ryder Beebe MD [facility] AMB   3/24/2021  1:40 PM MD MARIO May BS AMB

## 2021-01-22 NOTE — TELEPHONE ENCOUNTER
Second attempt to reach patient to discuss outstanding testing was unsuccessful. Left message asking for a call back.

## 2021-01-24 ENCOUNTER — TRANSCRIBE ORDER (OUTPATIENT)
Dept: REGISTRATION | Age: 41
End: 2021-01-24

## 2021-01-24 ENCOUNTER — HOSPITAL ENCOUNTER (OUTPATIENT)
Dept: PREADMISSION TESTING | Age: 41
Discharge: HOME | End: 2021-01-24
Payer: COMMERCIAL

## 2021-01-24 DIAGNOSIS — Z01.818 PREOP TESTING: ICD-10-CM

## 2021-01-24 DIAGNOSIS — Z01.818 PREOP TESTING: Primary | ICD-10-CM

## 2021-01-24 PROCEDURE — U0003 INFECTIOUS AGENT DETECTION BY NUCLEIC ACID (DNA OR RNA); SEVERE ACUTE RESPIRATORY SYNDROME CORONAVIRUS 2 (SARS-COV-2) (CORONAVIRUS DISEASE [COVID-19]), AMPLIFIED PROBE TECHNIQUE, MAKING USE OF HIGH THROUGHPUT TECHNOLOGIES AS DESCRIBED BY CMS-2020-01-R: HCPCS

## 2021-01-25 LAB — SARS-COV-2, COV2NT: NOT DETECTED

## 2021-01-27 ENCOUNTER — TELEPHONE (OUTPATIENT)
Dept: HEMATOLOGY | Age: 41
End: 2021-01-27

## 2021-01-27 ENCOUNTER — TRANSCRIBE ORDER (OUTPATIENT)
Dept: SCHEDULING | Age: 41
End: 2021-01-27

## 2021-01-27 DIAGNOSIS — R16.0 LIVER MASS: Primary | ICD-10-CM

## 2021-01-27 NOTE — TELEPHONE ENCOUNTER
Patient called back very upset about having to cancel her procedure which was scheduled for tomorrow morning (1.28.21). Patient also states she took off work for a few days specifically for this. I relayed to her that our nurse navigator had been trying to get in touch with her in regard to this information but patient states that she does not have any calls or messages to that effect. Relayed to patient that per provider, patient needs a higher level of care with a different type of procedure completed. Patient requests that nurse contact her ASAP to discuss next steps.

## 2021-01-27 NOTE — TELEPHONE ENCOUNTER
Two pt identifiers confirmed. Pt called this morning to ask if she can take Metoprolol Tartrate and Cardizem before her procedure tomorrow. After asking a provider, I was informed that she can but with small sips of water only. Pt vocalized understanding.

## 2021-02-04 ENCOUNTER — HOSPITAL ENCOUNTER (OUTPATIENT)
Dept: INTERVENTIONAL RADIOLOGY/VASCULAR | Age: 41
Discharge: HOME | End: 2021-02-04
Attending: HOSPITALIST | Admitting: STUDENT IN AN ORGANIZED HEALTH CARE EDUCATION/TRAINING PROGRAM
Payer: COMMERCIAL

## 2021-02-04 ENCOUNTER — HOSPITAL ENCOUNTER (OUTPATIENT)
Dept: INTERVENTIONAL RADIOLOGY/VASCULAR | Age: 41
Discharge: HOME | End: 2021-02-04
Attending: STUDENT IN AN ORGANIZED HEALTH CARE EDUCATION/TRAINING PROGRAM | Admitting: STUDENT IN AN ORGANIZED HEALTH CARE EDUCATION/TRAINING PROGRAM
Payer: COMMERCIAL

## 2021-02-04 VITALS
BODY MASS INDEX: 41.65 KG/M2 | RESPIRATION RATE: 20 BRPM | TEMPERATURE: 98.9 F | HEIGHT: 65 IN | WEIGHT: 250 LBS | HEART RATE: 65 BPM | SYSTOLIC BLOOD PRESSURE: 130 MMHG | DIASTOLIC BLOOD PRESSURE: 70 MMHG | OXYGEN SATURATION: 97 %

## 2021-02-04 VITALS
SYSTOLIC BLOOD PRESSURE: 163 MMHG | RESPIRATION RATE: 20 BRPM | HEART RATE: 64 BPM | DIASTOLIC BLOOD PRESSURE: 112 MMHG | OXYGEN SATURATION: 94 %

## 2021-02-04 DIAGNOSIS — K74.60 CIRRHOSIS OF LIVER WITHOUT ASCITES, UNSPECIFIED HEPATIC CIRRHOSIS TYPE (HCC): ICD-10-CM

## 2021-02-04 DIAGNOSIS — R16.0 LIVER MASS: ICD-10-CM

## 2021-02-04 PROCEDURE — 75889 VEIN X-RAY LIVER W/HEMODYNAM: CPT

## 2021-02-04 PROCEDURE — 47000 NEEDLE BIOPSY OF LIVER PERQ: CPT

## 2021-02-04 PROCEDURE — 88307 TISSUE EXAM BY PATHOLOGIST: CPT

## 2021-02-04 PROCEDURE — 74011000250 HC RX REV CODE- 250: Performed by: STUDENT IN AN ORGANIZED HEALTH CARE EDUCATION/TRAINING PROGRAM

## 2021-02-04 PROCEDURE — 74011250636 HC RX REV CODE- 250/636: Performed by: STUDENT IN AN ORGANIZED HEALTH CARE EDUCATION/TRAINING PROGRAM

## 2021-02-04 PROCEDURE — 74011250637 HC RX REV CODE- 250/637: Performed by: STUDENT IN AN ORGANIZED HEALTH CARE EDUCATION/TRAINING PROGRAM

## 2021-02-04 PROCEDURE — 88342 IMHCHEM/IMCYTCHM 1ST ANTB: CPT

## 2021-02-04 PROCEDURE — C1894 INTRO/SHEATH, NON-LASER: HCPCS

## 2021-02-04 PROCEDURE — 88341 IMHCHEM/IMCYTCHM EA ADD ANTB: CPT

## 2021-02-04 PROCEDURE — 74011000636 HC RX REV CODE- 636: Performed by: STUDENT IN AN ORGANIZED HEALTH CARE EDUCATION/TRAINING PROGRAM

## 2021-02-04 PROCEDURE — 77030014006 HC SPNG HEMSTAT J&J -A

## 2021-02-04 PROCEDURE — 77030020268 HC MISC GENERAL SUPPLY

## 2021-02-04 PROCEDURE — 88333 PATH CONSLTJ SURG CYTO XM 1: CPT

## 2021-02-04 PROCEDURE — 77030037218

## 2021-02-04 PROCEDURE — 88313 SPECIAL STAINS GROUP 2: CPT

## 2021-02-04 PROCEDURE — C1769 GUIDE WIRE: HCPCS

## 2021-02-04 PROCEDURE — 2709999900 HC NON-CHARGEABLE SUPPLY

## 2021-02-04 PROCEDURE — 75970 VASCULAR BIOPSY: CPT

## 2021-02-04 RX ORDER — LIDOCAINE HYDROCHLORIDE 20 MG/ML
20 INJECTION, SOLUTION INFILTRATION; PERINEURAL ONCE
Status: COMPLETED | OUTPATIENT
Start: 2021-02-04 | End: 2021-02-04

## 2021-02-04 RX ORDER — MIDAZOLAM HYDROCHLORIDE 1 MG/ML
5 INJECTION, SOLUTION INTRAMUSCULAR; INTRAVENOUS
Status: DISCONTINUED | OUTPATIENT
Start: 2021-02-04 | End: 2021-02-04

## 2021-02-04 RX ORDER — FENTANYL CITRATE 50 UG/ML
200 INJECTION, SOLUTION INTRAMUSCULAR; INTRAVENOUS
Status: DISCONTINUED | OUTPATIENT
Start: 2021-02-04 | End: 2021-02-04

## 2021-02-04 RX ORDER — MIDAZOLAM HYDROCHLORIDE 1 MG/ML
10 INJECTION, SOLUTION INTRAMUSCULAR; INTRAVENOUS
Status: DISCONTINUED | OUTPATIENT
Start: 2021-02-04 | End: 2021-02-04

## 2021-02-04 RX ORDER — SODIUM CHLORIDE 9 MG/ML
50 INJECTION, SOLUTION INTRAVENOUS CONTINUOUS
Status: DISCONTINUED | OUTPATIENT
Start: 2021-02-04 | End: 2021-02-04 | Stop reason: HOSPADM

## 2021-02-04 RX ORDER — LIDOCAINE HYDROCHLORIDE 20 MG/ML
20 INJECTION, SOLUTION INFILTRATION; PERINEURAL
Status: CANCELLED | OUTPATIENT
Start: 2021-02-04 | End: 2021-02-04

## 2021-02-04 RX ORDER — OXYCODONE AND ACETAMINOPHEN 5; 325 MG/1; MG/1
1 TABLET ORAL
Status: COMPLETED | OUTPATIENT
Start: 2021-02-04 | End: 2021-02-04

## 2021-02-04 RX ORDER — UREA 10 %
100 LOTION (ML) TOPICAL DAILY
COMMUNITY
End: 2021-08-30

## 2021-02-04 RX ADMIN — LIDOCAINE HYDROCHLORIDE 17 ML: 20 INJECTION, SOLUTION INFILTRATION; PERINEURAL at 15:00

## 2021-02-04 RX ADMIN — FENTANYL CITRATE 50 MCG: 50 INJECTION, SOLUTION INTRAMUSCULAR; INTRAVENOUS at 14:05

## 2021-02-04 RX ADMIN — FENTANYL CITRATE 25 MCG: 50 INJECTION, SOLUTION INTRAMUSCULAR; INTRAVENOUS at 13:46

## 2021-02-04 RX ADMIN — MIDAZOLAM HYDROCHLORIDE 1 MG: 1 INJECTION, SOLUTION INTRAMUSCULAR; INTRAVENOUS at 14:00

## 2021-02-04 RX ADMIN — SODIUM CHLORIDE 50 ML/HR: 9 INJECTION, SOLUTION INTRAVENOUS at 13:15

## 2021-02-04 RX ADMIN — FENTANYL CITRATE 50 MCG: 50 INJECTION, SOLUTION INTRAMUSCULAR; INTRAVENOUS at 13:34

## 2021-02-04 RX ADMIN — MIDAZOLAM HYDROCHLORIDE 1 MG: 1 INJECTION, SOLUTION INTRAMUSCULAR; INTRAVENOUS at 14:07

## 2021-02-04 RX ADMIN — OXYCODONE HYDROCHLORIDE AND ACETAMINOPHEN 1 TABLET: 5; 325 TABLET ORAL at 15:48

## 2021-02-04 RX ADMIN — IOPAMIDOL 36 ML: 612 INJECTION, SOLUTION INTRAVENOUS at 14:22

## 2021-02-04 RX ADMIN — MIDAZOLAM HYDROCHLORIDE 2 MG: 1 INJECTION, SOLUTION INTRAMUSCULAR; INTRAVENOUS at 13:46

## 2021-02-04 RX ADMIN — FENTANYL CITRATE 50 MCG: 50 INJECTION, SOLUTION INTRAMUSCULAR; INTRAVENOUS at 14:10

## 2021-02-04 RX ADMIN — MIDAZOLAM HYDROCHLORIDE 2 MG: 1 INJECTION, SOLUTION INTRAMUSCULAR; INTRAVENOUS at 13:34

## 2021-02-04 RX ADMIN — MIDAZOLAM HYDROCHLORIDE 1 MG: 1 INJECTION, SOLUTION INTRAMUSCULAR; INTRAVENOUS at 13:49

## 2021-02-04 NOTE — PROGRESS NOTES
Pt arrives ambulatory to angio department accompanied by friend for Transjugular liver biopsy, percutaneous liver bx and hepatic pressure measurements procedure. All assessments completed and consent was reviewed. Education given was regarding procedure, conscious sedation, post-procedure care and  management/follow-up. Opportunity for questions was provided and all questions and concerns were addressed.

## 2021-02-04 NOTE — ROUTINE PROCESS
Pt. Discharged to home and transported to d/c lot via w/c. Post liver bx d/c instructions reviewed with pt. and copy given. Verbalized understanding of wound and activity limitations.

## 2021-02-04 NOTE — PROGRESS NOTES
[address]  Radiology Department  [phone]      Radiologist :Marlena Severs    Date:2/4/21      Work Excuse/ Work Restriction / Return to Work        _Keila Gunter____________________ was seen in the Radiology Department today. Please     excuse them as the physician has recommended additional rest for recovery. He/She may return to work/school on _____2/8/21_________________________     without restrictions.             __Beni Damian RN____________________________________________    Nurse signature or MD Signature

## 2021-02-04 NOTE — DISCHARGE INSTRUCTIONS
[address]  [phone]    Radiologist:    Date:      Transjugular Liver Biopsy Discharge Instructions    Go home and rest today. Restrict your activity. Do not lift anything heavier than a small grocery bag (10 pounds) for the next 5 days. If you become sweaty, develop severe abdominal pain, dizziness - DIAL 911. However, mild pain under the right collarbone is normal.     You have been given sedating medications, so do not drive or make any important decisions. Resume your previous diet and prescribed medications. If you have aching pain in your abdomen, you may take Tylenol if allowed, as directed on the label or per physician, for pain or discomfort. Avoid Ibuprofen (Advil, Motrin etc.) and Aspirin today as they may increase your risk of bleeding. You may shower in 24 hours, washing with soap and water. Dry area well and keep it covered with a band aid. Do not soak or swim until the procedure site has healed completely. Watch your puncture site for any bleeding, swelling or signs of infection. Follow up with your physician as previously discussed to receive your results. Should you experience any of these significant changes, please call [phone] between the hours of 7:30 am and 10 pm or [phone] after hours. After hours, ask the  to page the [address] Technologist, and describe the problem to the technologist.   Sarah Do have received sedation medications today. YOU SHOULD NOT DRIVE FOR 24 HOURS, DO NOT OPERATE HEAVY MACHINERY, DO NOT MAKE ANY LEGAL DECISIONS OR SIGN LEGAL DOCUMENTS FOR 24 HOURS. DO NOT DRINK ALCOHOL, TAKE ANY MEDICATIONS UNLESS PRESCRIBED BY YOUR DOCTOR. IF YOU ARE A CAREGIVER, SOMEONE SHOULD TAKE THAT ROLE FOR 24 HOURS.        Side effects of sedation medications and other medications used today have been reviewed  Those side effects can include but are not limited to: dizziness, drowsiness, poor balance, fatigue, sleepiness. Take precautions at home to prevent falls, such as assistance with walking or stairs if allowed and /or when needed or position changes. Allergic or adverse reactions could include nausea, itching, hives, dizziness, or anything else out of the ordinary. Should you experience any of these significant changes, please call [phone] between the hours of 7:30 am and 10 pm or [phone] after hours. After hours, ask the  to page the X-ray Technologist, and describe the problem to the technologist. If you are experiencing chest pain, shortness of breath, altered mental state, unusual bleeding or any other emergent symptom you should call 911 immediately. the ordinary.

## 2021-02-04 NOTE — H&P
Radiology History and Physical    Patient: Ebony Vicente 36 y.o. female       Chief Complaint: No chief complaint on file. History of Present Illness: Cirrhosis workup. Liver masses.     History:    Past Medical History:   Diagnosis Date    Allergy     Anxiety     Asthma     Congenital heart disease      Family History   Problem Relation Age of Onset    Hypertension Mother      Social History     Socioeconomic History    Marital status:      Spouse name: Not on file    Number of children: Not on file    Years of education: Not on file    Highest education level: Not on file   Occupational History    Not on file   Social Needs    Financial resource strain: Not on file    Food insecurity     Worry: Not on file     Inability: Not on file    Transportation needs     Medical: Not on file     Non-medical: Not on file   Tobacco Use    Smoking status: Light Tobacco Smoker     Packs/day: 0.25     Years: 2.00     Pack years: 0.50     Types: Cigarettes    Smokeless tobacco: Never Used    Tobacco comment: 1 pack in 3 weeks    Substance and Sexual Activity    Alcohol use: Not Currently     Alcohol/week: 0.0 standard drinks     Comment: socially    Drug use: Yes     Types: Marijuana    Sexual activity: Not Currently     Birth control/protection: None     Comment: ,works in [employer]    Physical activity     Days per week: Not on file     Minutes per session: Not on file    Stress: Not on file   Relationships    Social connections     Talks on phone: Not on file     Gets together: Not on file     Attends [employer] service: Not on file     Active member of club or organization: Not on file     Attends meetings of clubs or organizations: Not on file     Relationship status: Not on file    Intimate partner violence     Fear of current or ex partner: Not on file     Emotionally abused: Not on file     Physically abused: Not on file     Forced sexual activity: Not on file Other Topics Concern    Not on file   Social History Narrative    Not on file       Allergies: Allergies   Allergen Reactions    Codeine Unknown (comments)     Patient doesntt recall the reaction    Hydrocodone Other (comments)     She becomes mean     Pcn [Penicillins] Unknown (comments)    Zithromax [Azithromycin] Rash       Current Medications:  No current facility-administered medications for this encounter. Physical Exam:  There were no vitals taken for this visit. GENERAL: alert, cooperative, no distress, appears stated age  LUNG: clear to auscultation bilaterally  HEART: regular rate and rhythm  ABD: Non tender, non distended. Alerts:    Hospital Problems  Date Reviewed: 1/15/2021    None          Laboratory:    No results for input(s): HGB, HCT, WBC, PLT, INR, BUN, CREA, K, CRCLT, HGBEXT, HCTEXT, PLTEXT, INREXT in the last 72 hours. No lab exists for component: PTT, PT      Plan of Care/Planned Procedure:  Risks, benefits, and alternatives reviewed with patient and she agrees to proceed with the procedure. Deemed appropriate or moderate sedation with versed and fentanyl.       Carlos Eduardo Peterson MD

## 2021-02-18 ENCOUNTER — DOCUMENTATION ONLY (OUTPATIENT)
Dept: HEMATOLOGY | Age: 41
End: 2021-02-18

## 2021-02-22 ENCOUNTER — OFFICE VISIT (OUTPATIENT)
Dept: HEMATOLOGY | Age: 41
End: 2021-02-22
Payer: COMMERCIAL

## 2021-02-22 VITALS
HEART RATE: 56 BPM | OXYGEN SATURATION: 99 % | BODY MASS INDEX: 42.45 KG/M2 | WEIGHT: 254.8 LBS | RESPIRATION RATE: 18 BRPM | HEIGHT: 65 IN | TEMPERATURE: 98.9 F | DIASTOLIC BLOOD PRESSURE: 83 MMHG | SYSTOLIC BLOOD PRESSURE: 138 MMHG

## 2021-02-22 DIAGNOSIS — R16.0 LIVER MASSES: Primary | ICD-10-CM

## 2021-02-22 DIAGNOSIS — E66.01 SEVERE OBESITY (HCC): ICD-10-CM

## 2021-02-22 PROCEDURE — 99214 OFFICE O/P EST MOD 30 MIN: CPT | Performed by: HOSPITALIST

## 2021-02-22 NOTE — PROGRESS NOTES
Identified pt with two pt identifiers(name and ). Reviewed record in preparation for visit and have obtained necessary documentation. Chief Complaint   Patient presents with    Other     Hepatocellular Carcinoma    Cirrhosis Of Liver      Vitals:    21 1315   BP: 138/83   Pulse: (!) 56   Resp: 18   Temp: 98.9 °F (37.2 °C)   TempSrc: Temporal   SpO2: 99%   Weight: 254 lb 12.8 oz (115.6 kg)   Height: 5' 5\" (1.651 m)   PainSc:   0 - No pain       Health Maintenance Review: Patient reminded of \"due or due soon\" health maintenance. I have asked the patient to contact his/her primary care provider (PCP) for follow-up on his/her health maintenance. Coordination of Care Questionnaire:  :   1) Have you been to an emergency room, urgent care, or hospitalized since your last visit? If yes, where when, and reason for visit? no       2. Have seen or consulted any other health care provider since your last visit? If yes, where when, and reason for visit? NO      Patient is accompanied by self I have received verbal consent from Carl Pierce to discuss any/all medical information while they are present in the room.

## 2021-02-22 NOTE — PATIENT INSTRUCTIONS
Learning About the [facility] Diet What is the Mediterranean diet? The Mediterranean diet is a style of eating rather than a diet plan. It features foods eaten in [place], [place], [place] and [place], and other countries along the . It emphasizes eating foods like fish, fruits, vegetables, beans, high-fiber breads and whole grains, nuts, and olive oil. This style of eating includes limited red meat, cheese, and sweets. Why choose the Mediterranean diet? A Mediterranean-style diet may improve heart health. It contains more fat than other heart-healthy diets. But the fats are mainly from nuts, unsaturated oils (such as fish oils and olive oil), and certain nut or seed oils (such as canola, soybean, or flaxseed oil). These fats may help protect the heart and blood vessels. How can you get started on the Mediterranean diet? Here are some things you can do to switch to a more Mediterranean way of eating. What to eat · Eat a variety of fruits and vegetables each day, such as grapes, blueberries, tomatoes, broccoli, peppers, figs, olives, spinach, eggplant, beans, lentils, and chickpeas. · Eat a variety of whole-grain foods each day, such as oats, brown rice, and whole wheat bread, pasta, and couscous. · Eat fish at least 2 times a week. Try tuna, salmon, mackerel, lake trout, herring, or sardines. · Eat moderate amounts of low-fat dairy products, such as milk, cheese, or yogurt. · Eat moderate amounts of poultry and eggs. · Choose healthy (unsaturated) fats, such as nuts, olive oil, and certain nut or seed oils like canola, soybean, and flaxseed. · Limit unhealthy (saturated) fats, such as butter, palm oil, and coconut oil. And limit fats found in animal products, such as meat and dairy products made with whole milk. Try to eat red meat only a few times a month in very small amounts. · Limit sweets and desserts to only a few times a week. This includes sugar-sweetened drinks like soda. The Mediterranean diet may also include red wine with your meal1 glass each day for women and up to 2 glasses a day for men. Tips for eating at home · Use herbs, spices, garlic, lemon zest, and citrus juice instead of salt to add flavor to foods. · Add avocado slices to your sandwich instead of ferrara. · Have fish for lunch or dinner instead of red meat. Brush the fish with olive oil, and broil or grill it. · Sprinkle your salad with seeds or nuts instead of cheese. · Cook with olive or canola oil instead of butter or oils that are high in saturated fat. · Switch from 2% milk or whole milk to 1% or fat-free milk. · Dip raw vegetables in a vinaigrette dressing or hummus instead of dips made from mayonnaise or sour cream. 
· Have a piece of fruit for dessert instead of a piece of cake. Try baked apples, or have some dried fruit. Tips for eating out · Try broiled, grilled, baked, or poached fish instead of having it fried or breaded. · Ask your  to have your meals prepared with olive oil instead of butter. · Order dishes made with marinara sauce or sauces made from olive oil. Avoid sauces made from cream or mayonnaise. · Choose whole-grain breads, whole wheat pasta and pizza crust, brown rice, beans, and lentils. · Cut back on butter or margarine on bread. Instead, you can dip your bread in a small amount of olive oil. · Ask for a side salad or grilled vegetables instead of french fries or chips. Where can you learn more? Go to http://www.gray.com/ Enter 782-471-5170 in the search box to learn more about \"Learning About the Mediterranean Diet. \" Current as of: August 22, 2019               Content Version: 12.6 © 7053-8538 [employer] 2000[employer]. Care instructions adapted under license by [employer] (which disclaims liability or warranty for this information). If you have questions about a medical condition or this instruction, always ask your healthcare professional. [address] any warranty or liability for your use of this information.

## 2021-02-22 NOTE — LETTER
3/1/2021 Patient: Jethro Jaime YOB: 1980 Date of Visit: 2/22/2021 Lauren Ashton MD 
[address] [facility] [address] [facility] 7 [address] [address] [employer] Dear Lauren Ashton MD, Thank you for referring Ms. Hosea Valdez to [address] for evaluation. My notes for this consultation are attached. If you have questions, please do not hesitate to call me. I look forward to following your patient along with you. Sincerely, Hermelinda Ross MD

## 2021-02-22 NOTE — PROGRESS NOTES
Marlen Axon [address]      Andreea Zamora MD, Genejoshua Star, Carlos A Garcia MD, MPH      Rahul Banerjee, PA-GERARD Galindo, [facility]-BC     Becka BALL [place], [facility]   Fabrice Solitario, [facility]-C    Kaitlin Carroll, [facility]       Louie Muse [facility] 136    at [address], [address] [place] [address], [facility] 22.    [phone]    FAX: [address] [place] [address], [address]    [phone]    FAX: [phone]     Patient Care Team:  Charlette Damon MD as PCP - General (Cardiology)  Charlette Damon MD (Cardiology)  Michelle Soto MD (Cardiology)    Problem List  Date Reviewed: 1/15/2021          Codes Class Noted    Liver mass ICD-10-CM: R16.0  ICD-9-CM: 573.8  3/1/2021        Chronic passive hepatic congestion ICD-10-CM: K76.1  ICD-9-CM: 573.0  3/1/2021        Severe obesity ([facility] [address] [address] ICD-10-CM: E66.01  ICD-9-CM: 278.01  2/11/2019        ASD (atrial septal defect) repair ICD-10-CM: Q21.1  ICD-9-CM: 745.5  7/26/2017        Aorta coarctation ICD-10-CM: Q25.1  ICD-9-CM: 747.10  7/26/2017        Paroxysmal SVT (supraventricular tachycardia) ([facility] ICD-10-CM: I47.1  ICD-9-CM: 427.0  7/26/2017    Overview Signed 12/9/2020 12:14 PM by Michelle Soto MD     12/9/2020 EP study and ablation of right atrial tachycardia and atrial flutter              HTN (hypertension) ICD-10-CM: I10  ICD-9-CM: 401.9  7/29/2014           Elda Breen returns to the liver institute of [facility] for follow up and management of liver mass  All medical records sent by the referring physicians were reviewed including imaging studies     The patient is a 36 y.o.  old  female without prior history of cirrhosis or chronic liver disease    Patient initially had a CTA of the chest performed in 09/2020 to evaluate possible coarctation of the aorta but this incidentally noted multiple hepatic masses with irregular internal enhancement with the largest measuring 5 cm. Patient subsequently underwent an abdominal MRI with and without contrast on 11/2020. This demonstrates innumerable hepatic masses within a  background of cirrhosis. Some of the masses have arterial enhancement compatible with hepatocellular carcinoma in segments 5 and 6. Other hepatic masses are nonspecific. 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 No significant portal vein flow to the liver. Serologic evaluation for markers of chronic liver disease performed were all negative. AFP was normal at 1.5    She underwent a CT guided biopsy of liver mass by IR on 02/2021. This demonstrated non neoplastic hepatic parenchyma. Immuno histochemistry was negative for B catenin and normal glutamine synthetase lorena-central staining. There was preserved reticulin staining. Liver biopsy showed benign hepatic hepatic parenchyma with centrilobular sinusoidal dilation and associated capitalization of sinusoids. There was minimal fibrous expansion. There was no steatosis, no ballooning and no lobular inflammation. The patient has not developed any of the major complications of cirrhosis to date. The patient has no symptoms which can be attributed to the liver disorder. The patient is not currently experiencing the following symptoms of liver disease:  fatigue, yellowing of the eyes or skin, problems concentrating, swelling of the abdomen,   swelling of the lower extremities, hematemesis, hematochezia. The patient completes all daily activities without any functional limitations. ASSESSMENT AND PLAN:    Liver masses  The diagnosis is made by MRI with contrast of the abdomen. The patient underwent a CT guided biopsy of liver mass in 02/2021. Pathology of mass was non neoplastic. Suggest possible granuloma.   There was no underlying cirrhosis. Cancer serologic markers were all negative    Passive hepatic congestion  The diagnosis is based on liver biopsy showing centrilobular sinusoidal dilatation  Recommend Right heart catheterization for right heart pressure measurements and pulmonary artery pressure  Follow up with cardiology    Abnormal liver enzymes    AST is elevated, ALT is normal, ALP is elevated. The liver function is normal. The platelet count is normal.  This is most likely due to passive hepatic congestion. There was no steatosis on liver biopsy but this showed centrilobular sinusoidal dilatation. Serologic markers for causes of chronic liver disease were all negative    We will continue to monitor LFT    Screening for Esophageal varices   There is no need to perform EGD    Treatment of other medical problems in patients with chronic liver disease  There are no contraindications for the patient to take most medications that are necessary for treatment of other medical issues. The patient does not consume alcohol on a daily basis. Vaccinations   Vaccination for viral hepatitis A and B is recommended since the patient has no serologic evidence of previous exposure or vaccination with immunity. Routine vaccinations against other bacterial and viral agents can be performed as indicated. Annual flu vaccination should be administered if indicated. Obesity  Recommend weight loss      ALLERGIES  Allergies   Allergen Reactions    Codeine Unknown (comments)     Patient doesntt recall the reaction    Hydrocodone Other (comments)     She becomes mean     Pcn [Penicillins] Unknown (comments)    Zithromax [Azithromycin] Rash       MEDICATIONS  Current Outpatient Medications   Medication Sig    cyanocobalamin (Vitamin B-12) 100 mcg tablet Take 100 mcg by mouth daily.  ascorbic acid, vitamin C, (Vitamin C) 500 mg tablet Take  by mouth.  collagen, bovine, 100 % pwpk by Apply Externally route.     vitamin B complex (B COMPLEX-100 PO) Take  by mouth.  co-enzyme Q-10 (Co Q-10) 100 mg capsule Take 100 mg by mouth daily.  dilTIAZem ER (CARDIZEM CD) 120 mg capsule Take 1 Cap by mouth daily.  metoprolol tartrate (LOPRESSOR) 100 mg IR tablet TAKE ONE TABLET BY MOUTH TWICE A DAY    albuterol (PROVENTIL HFA, VENTOLIN HFA, PROAIR HFA) 90 mcg/actuation inhaler Take 1 Puff by inhalation every four (4) hours as needed for Wheezing.  MULTIVITS,CA,MINERALS/IRON/FA (ONE-A-DAY WOMENS FORMULA PO) Take  by mouth.  Omega-3-DHA-EPA-Fish Oil 1,000 (120-180) mg cap Take 1,000 mg by mouth daily. No current facility-administered medications for this visit. SYSTEM REVIEW NOT RELATED TO LIVER DISEASE OR REVIEWED ABOVE:  Constitution systems: Negative for fever, chills, weight gain, weight loss. Eyes: Negative for visual changes. ENT: Negative for sore throat, painful swallowing. Respiratory: Negative for cough, hemoptysis, SOB. Cardiology: Negative for chest pain, palpitations. GI:  Negative for constipation or diarrhea. : Negative for urinary frequency, dysuria, hematuria, nocturia. Skin: Negative for rash. Hematology: Negative for easy bruising, blood clots. Musculo-skelatal: Negative for back pain, muscle pain, weakness. Neurologic: Negative for headaches, dizziness, vertigo, memory problems not related to HE. Psychology: Negative for anxiety, depression. FAMILY HISTORY:  The father  Has/had the following following chronic disease(s): Colon cancer  The mother Has/had the following chronic disease(s): HTN, chronic back pain  There is no family history of liver disease. There is no family history of immune disorders. SOCIAL HISTORY:  The patient is . The patient has no children. The patient smoke occasionally usually once per week  Patient has a history of heavy alcohol use for about 6 months in 2010.  The patient drinks alcohol occasionally  The patient currently works full time in a [employer]     PHYSICAL EXAMINATION:  Visit Vitals  /83 (BP 1 Location: Right upper arm, BP Patient Position: Sitting, BP Cuff Size: Large adult)   Pulse (!) 56   Temp 98.9 °F (37.2 °C) (Temporal)   Resp 18   Ht 5' 5\" (1.651 m)   Wt 254 lb 12.8 oz (115.6 kg)   SpO2 99%   BMI 42.40 kg/m²     General: No acute distress. Eyes: Sclera anicteric. ENT: No oral lesions. Thyroid normal.  Nodes: No adenopathy. Skin: No spider angiomata. No jaundice. No palmar erythema. Respiratory: Lungs clear to auscultation. Cardiovascular: Regular heart rate. No murmurs. No JVD. Abdomen: Soft non-tender, liver size normal to percussion/palpation. Spleen not palpable. No obvious ascites. Extremities: No edema. No muscle wasting. No gross arthritic changes. Neurologic: Alert and oriented. Cranial nerves grossly intact. No asterixis. PHYSICAL EXAMINATION:  Visit Vitals  /83 (BP 1 Location: Right upper arm, BP Patient Position: Sitting, BP Cuff Size: Large adult)   Pulse (!) 56   Temp 98.9 °F (37.2 °C) (Temporal)   Resp 18   Ht 5' 5\" (1.651 m)   Wt 254 lb 12.8 oz (115.6 kg)   SpO2 99%   BMI 42.40 kg/m²     General: No acute distress. Eyes: Sclera anicteric. ENT: No oral lesions. Thyroid normal.  Nodes: No adenopathy. Skin: No spider angiomata. No jaundice. No palmar erythema. Respiratory: Lungs clear to auscultation. Cardiovascular: Regular heart rate. No murmurs. No JVD. Abdomen: Soft non-tender. Liver size normal to percussion/palpation. Spleen not palpable. No obvious ascites. Extremities: No edema. No muscle wasting. No gross arthritic changes. Neurologic: Alert and oriented. Cranial nerves grossly intact. No asterixis. LABORATORY STUDIES:  Recent liver function panel, CBC with platelet count and BMP are not available. These studies will be performed.   Liver [place] of [address] [place] [address] 1/6/2021 11/10/2020 4/3/2017   WBC 3.6 - 11.0 K/uL 8.9 6.7 6.9   ANC 1.8 - 8.0 K/UL 5.8 4.0 4.1   HGB 11.5 - 16.0 g/dL 15.4 15.0 15.1    - 400 K/uL 286 294 318   INR 0.9 - 1.1   1.0     AST 15 - 37 U/L 59 (H)  45 (H)   ALT 12 - 78 U/L 51  26   Alk Phos 45 - 117 U/L 183 (H)  152 (H)   Bili, Total 0.2 - 1.0 MG/DL 0.8  0.5   Albumin 3.5 - 5.0 g/dL 3.5  3.9   BUN 6 - 20 MG/DL 13 11 10   Creat 0.55 - 1.02 MG/DL 0.81 0.72 0.63   Na 136 - 145 mmol/L 138 141 141   K 3.5 - 5.1 mmol/L 4.3 4.2 4.3   Cl 97 - 108 mmol/L 104 105 102   CO2 21 - 32 mmol/L 33 (H) 25 27   Glucose 65 - 100 mg/dL 93 103 (H) 83     SEROLOGIES:  Not available or performed. Testing will be performed as indicated. Serologies Latest Ref Rng & Units 1/6/2021   Hep A Ab, Total Negative   Negative   Hep B Surface Ag Index <0.10   Hep B Surface Ag Interp Negative   Negative   Hep B Core Ab, Total Negative   Negative   Hep B Surface Ab mIU/mL <3.10   Hep B Surface Ab Interp NONREACTIVE   NONREACTIVE   Hep C Ab 0.0 - 0.9 s/co ratio <0.1   Ferritin 8 - 252 NG/ML 88   Iron % Saturation 20 - 50 % 39   CHANTAL, IFA  Negative   ASMCA 0 - 19 Units 14   M2 Ab 0.0 - 20.0 Units <20.0   Ceruloplasmin 19.0 - 39.0 mg/dL 27.4   Alpha-1 antitrypsin level 100 - 188 mg/dL 138       LIVER HISTOLOGY:  2/4/2021: CT guided biopsy of liver mass by IR on 02/2021. This demonstrated non neoplastic hepatic parenchyma. Immuno histochemistry was negative for B catenin and normal glutamine synthetase lorena-central staining. There was preserved reticulin staining. Benign hepatic hepatic parenchyma with centrilobular sinusoidal dilation and associated capitalization of sinusoids. There was minimal fibrous expansion. There was no steatosis, no ballooning and no lobular inflammation. ENDOSCOPIC PROCEDURES:  Not available or performed    RADIOLOGY:  11/2020: Abdominal MRI: This demonstrates innumerable hepatic masses within a background of cirrhosis.  Some of the  masses have arterial enhancement compatible with hepatocellular carcinoma in segments 5 and 6. Other hepatic masses are nonspecific. 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 No significant portal vein flow to the liver. 09/2020: CTA of the chest: multiple hepatic masses with irregular internal enhancement with the largest measuring 5 cm. OTHER TESTING:  Not available or performed    FOLLOW-UP:  All of the issues listed above in the Assessment and Plan were discussed with the patient. All questions were answered. The patient expressed a clear understanding of the above.     [address] [place] [address] 6 months for routine monitoring    Jodi Shah MD, MPH  Advanced Hepatology  [facility] of [address] [address] [place] Regan, [address] [facility] 22.  [phone]  [address] [facility]

## 2021-02-25 ENCOUNTER — TELEPHONE (OUTPATIENT)
Dept: HEMATOLOGY | Age: 41
End: 2021-02-25

## 2021-02-25 ENCOUNTER — TELEPHONE (OUTPATIENT)
Dept: CARDIOLOGY CLINIC | Age: 41
End: 2021-02-25

## 2021-02-25 NOTE — TELEPHONE ENCOUNTER
Patient returned nurse's call from back in January- to discuss coordinating a [address] [facility]. I advised patient I would have nurse return her call as soon as she could. Patient can be reached at [phone].

## 2021-02-25 NOTE — TELEPHONE ENCOUNTER
Returned patient call and verified patient by two identifiers. Patient stated that she was returning a nurse call from back in January regarding scheduling a test/procedure. I advised Mrs. Aleman that Dr. Jose العراقي nurse was reaching out to her and I would have her return her call. Patient verbalized understanding and had no additional questions regarding scheduling with Dr. Roger Mcclain at this time.

## 2021-02-25 NOTE — TELEPHONE ENCOUNTER
Patient returned call back. Dr. Jud Meza last progress note from 2/22/21 sent to Dr. Mirlande Craven. Made patient aware information has been sent--verbalize understanding. Suggestion for right heart cath to measure heart pressure.

## 2021-02-25 NOTE — TELEPHONE ENCOUNTER
Patient is requesting an appointment with Dr. Divine Montero. Please advise.      Phone: [phone]

## 2021-02-26 NOTE — TELEPHONE ENCOUNTER
Returned call to patient. Two patient indentifiers verified. Attempted to scheduled patient for RHC. Pt stated she is not having another procedure. Pt thought this was a test that was done in the office. Explained to patient that her liver doctor is the one requesting the right heart cath. Pt stated she is not having another procedure.

## 2021-02-26 NOTE — TELEPHONE ENCOUNTER
MD Dhruv Gonsalves, RN   Caller: Unspecified (Today, 12:59 PM)             Can you please send a message to their office and just state pt refused so they know?  thx

## 2021-03-01 PROBLEM — K76.1 CHRONIC PASSIVE HEPATIC CONGESTION: Status: ACTIVE | Noted: 2021-03-01

## 2021-03-01 PROBLEM — R16.0 LIVER MASS: Status: ACTIVE | Noted: 2021-03-01

## 2021-03-23 ENCOUNTER — TELEPHONE (OUTPATIENT)
Dept: HEMATOLOGY | Age: 41
End: 2021-03-23

## 2021-03-23 NOTE — TELEPHONE ENCOUNTER
Printed encounter in given to Dr. [email or web address] [email or web address] Discuss w/ Dr. Li Records will be calling the patient.

## 2021-03-23 NOTE — TELEPHONE ENCOUNTER
Per Dr. Bing Whitlock call patient give her appt for 3/25/21 @5659 related to patient concerns/complaints surrounding her liver bx which was performed in Feb 2021 by IR dept. Left detail message on voice mail with date/time of appt.

## 2021-03-23 NOTE — TELEPHONE ENCOUNTER
Patient called this morning and stated that she is having pain on the right side and that she has permanent nerve damage from the procedure that was done on her. Patient states she was awake during her procedure, and was not completely sedated. Patient states that a text message was sent  to Dr. Debby Howe personal cell number on 03/22/2021 and she did not get a text in return. Patient did not call the office after hours service to get in touch with the provider who is on call to discuss how severe her pain was. Patient does not have any records of going to the emergency room to be treated for the pain she states she was in. Patient did not call the office yesterday during work hours to make us aware of the pain she was experiencing. She states that the pain is making it unbearable for her to work and that she would like to know what to do. I explained to the patient that Dr. Brenda Squires will be made aware of her concern. Patient stated that she can be reached at the number [phone].

## 2021-03-26 RX ORDER — METOPROLOL TARTRATE 100 MG/1
TABLET ORAL
Qty: 60 TAB | Refills: 4 | Status: SHIPPED | OUTPATIENT
Start: 2021-03-26 | End: 2021-08-30

## 2021-03-26 NOTE — TELEPHONE ENCOUNTER
Requested Prescriptions     Signed Prescriptions Disp Refills    metoprolol tartrate (LOPRESSOR) 100 mg IR tablet 60 Tab 4     Sig: TAKE ONE TABLET BY MOUTH TWICE A DAY     Authorizing Provider: Red Dollar     Ordering User: Ofelia Bray       Last office visit 12/29/2020    Per Dr. Drew Shipman   Date Time Provider Bassem Jack   8/23/2021 11:30 AM Florentino Abernathy MD [facility]

## 2021-04-16 RX ORDER — DILTIAZEM HYDROCHLORIDE 120 MG/1
CAPSULE, EXTENDED RELEASE ORAL
Qty: 30 CAP | Refills: 4 | Status: SHIPPED | OUTPATIENT
Start: 2021-04-16 | End: 2021-10-21

## 2021-08-27 ENCOUNTER — TELEPHONE (OUTPATIENT)
Dept: HEMATOLOGY | Age: 41
End: 2021-08-27

## 2021-08-30 ENCOUNTER — OFFICE VISIT (OUTPATIENT)
Dept: INTERNAL MEDICINE CLINIC | Age: 41
End: 2021-08-30
Payer: COMMERCIAL

## 2021-08-30 VITALS
OXYGEN SATURATION: 98 % | WEIGHT: 262.4 LBS | SYSTOLIC BLOOD PRESSURE: 122 MMHG | HEART RATE: 51 BPM | BODY MASS INDEX: 43.72 KG/M2 | HEIGHT: 65 IN | RESPIRATION RATE: 16 BRPM | DIASTOLIC BLOOD PRESSURE: 83 MMHG | TEMPERATURE: 98.8 F

## 2021-08-30 DIAGNOSIS — Z80.0 FAMILY HISTORY OF COLON CANCER: ICD-10-CM

## 2021-08-30 DIAGNOSIS — I15.8 OTHER SECONDARY HYPERTENSION: Primary | ICD-10-CM

## 2021-08-30 DIAGNOSIS — I47.1 PAROXYSMAL SVT (SUPRAVENTRICULAR TACHYCARDIA) (HCC): ICD-10-CM

## 2021-08-30 PROCEDURE — 99204 OFFICE O/P NEW MOD 45 MIN: CPT | Performed by: INTERNAL MEDICINE

## 2021-08-30 RX ORDER — SENNOSIDES 8.6 MG/1
1 TABLET ORAL DAILY
COMMUNITY
End: 2022-05-16

## 2021-08-30 RX ORDER — IBUPROFEN 800 MG/1
TABLET ORAL
COMMUNITY
End: 2022-05-16

## 2021-08-30 RX ORDER — ASPIRIN 81 MG/1
TABLET ORAL DAILY
COMMUNITY
End: 2022-05-16

## 2021-08-30 RX ORDER — FUROSEMIDE 40 MG/1
TABLET ORAL DAILY
COMMUNITY
End: 2022-05-16

## 2021-08-30 RX ORDER — CLONIDINE HYDROCHLORIDE 0.1 MG/1
TABLET ORAL 2 TIMES DAILY
COMMUNITY
End: 2022-05-16 | Stop reason: SDUPTHER

## 2021-08-30 RX ORDER — METOPROLOL TARTRATE 100 MG/1
TABLET ORAL
Qty: 60 TABLET | Refills: 0 | Status: SHIPPED | OUTPATIENT
Start: 2021-08-30 | End: 2021-10-11 | Stop reason: SDUPTHER

## 2021-08-30 RX ORDER — GABAPENTIN 300 MG/1
300 CAPSULE ORAL DAILY
COMMUNITY
Start: 2021-08-24 | End: 2021-09-09 | Stop reason: SDUPTHER

## 2021-08-30 RX ORDER — TRAMADOL HYDROCHLORIDE 50 MG/1
50 TABLET ORAL
COMMUNITY
End: 2022-05-16

## 2021-08-30 RX ORDER — POTASSIUM CHLORIDE 1.5 G/1.77G
20 POWDER, FOR SOLUTION ORAL 2 TIMES DAILY WITH MEALS
COMMUNITY
End: 2022-05-16

## 2021-09-09 DIAGNOSIS — R52 PAIN: Primary | ICD-10-CM

## 2021-09-09 NOTE — TELEPHONE ENCOUNTER
----- Message from Bill Hager sent at 9/9/2021 12:29 PM EDT -----  Regarding: Dr Jem Diallo  Medication Refill    Caller (if not patient):pt      Relationship of caller (if not patient):pt      Best contact number(s):[phone]       Name of medication and dosage if known: Gabapentin 300 mg      Is patient out of this medication (yes/no): yes      Pharmacy name: [address] [place] [address] listed in chart? (yes/no): yes  Pharmacy phone number:[phone]      Details to clarify the request: Pt is requesting a refill for Gabapentin 300 mg to be called to the pharmacy on file.  Pt advised she had knee surgery on 7/22/21 and the initial prescribing Dr Imelda Yarbrough instructed to make refill request with PCP      Message from [place]

## 2021-09-10 RX ORDER — GABAPENTIN 300 MG/1
300 CAPSULE ORAL DAILY
Qty: 30 CAPSULE | Refills: 5 | Status: SHIPPED | OUTPATIENT
Start: 2021-09-10 | End: 2022-03-17

## 2021-09-16 ENCOUNTER — TELEPHONE (OUTPATIENT)
Dept: INTERNAL MEDICINE CLINIC | Age: 41
End: 2021-09-16

## 2021-09-16 NOTE — TELEPHONE ENCOUNTER
----- Message from Bernadette Campuzano sent at 9/16/2021 10:58 AM EDT -----  Regarding:  Cece Shah MD  General Message/Vendor Calls    Caller's first and last name: [facility] with at homecare       Reason for call: Vendor needs patient orders signed from 8/6/2021-8/27/2021 total of 7 orders      Callback required yes/no and why: No      Best contact number(s): N/A      Details to clarify the request:N/A      Bernadette Campuzano

## 2021-10-05 RX ORDER — METOPROLOL TARTRATE 100 MG/1
TABLET ORAL
Qty: 60 TABLET | Refills: 0 | OUTPATIENT
Start: 2021-10-05

## 2021-10-11 RX ORDER — METOPROLOL TARTRATE 100 MG/1
100 TABLET ORAL 2 TIMES DAILY
Qty: 60 TABLET | Refills: 0 | Status: SHIPPED | OUTPATIENT
Start: 2021-10-11 | End: 2021-11-17

## 2021-10-11 NOTE — TELEPHONE ENCOUNTER
Patient states the pharmacy would not refill medication. Patient has an upcoming appointment on 10/22/21.       Phone: [phone]

## 2021-10-11 NOTE — TELEPHONE ENCOUNTER
Requested Prescriptions     Signed Prescriptions Disp Refills    metoprolol tartrate (LOPRESSOR) 100 mg IR tablet 60 Tablet 0     Sig: Take 1 Tablet by mouth two (2) times a day.  Must be seen in clinic for refills     Authorizing Provider: Lela Bonilla     Ordering User: Adams Hester     Per orders  Pt aware, 2 pt identifiers used

## 2021-10-21 RX ORDER — DILTIAZEM HYDROCHLORIDE 120 MG/1
CAPSULE, EXTENDED RELEASE ORAL
Qty: 30 CAPSULE | Refills: 4 | Status: SHIPPED | OUTPATIENT
Start: 2021-10-21 | End: 2021-10-21 | Stop reason: SDUPTHER

## 2021-10-21 RX ORDER — DILTIAZEM HYDROCHLORIDE 120 MG/1
120 CAPSULE, COATED, EXTENDED RELEASE ORAL DAILY
Qty: 90 CAPSULE | Refills: 1 | Status: SHIPPED | OUTPATIENT
Start: 2021-10-21 | End: 2022-04-15

## 2021-10-21 NOTE — TELEPHONE ENCOUNTER
Request for Cardizem 120 mg daily. Last office visit 12/29/20, next office visit 10/22/21.  Refills per verbal order from Dr. Nilo Ascencio.

## 2021-10-22 ENCOUNTER — OFFICE VISIT (OUTPATIENT)
Dept: CARDIOLOGY CLINIC | Age: 41
End: 2021-10-22
Payer: COMMERCIAL

## 2021-10-22 VITALS
HEIGHT: 65 IN | SYSTOLIC BLOOD PRESSURE: 120 MMHG | RESPIRATION RATE: 16 BRPM | WEIGHT: 250 LBS | BODY MASS INDEX: 41.65 KG/M2 | OXYGEN SATURATION: 98 % | HEART RATE: 49 BPM | DIASTOLIC BLOOD PRESSURE: 80 MMHG

## 2021-10-22 DIAGNOSIS — Z86.79 S/P ABLATION OF ATRIAL FLUTTER: ICD-10-CM

## 2021-10-22 DIAGNOSIS — I47.1 PSVT (PAROXYSMAL SUPRAVENTRICULAR TACHYCARDIA) (HCC): ICD-10-CM

## 2021-10-22 DIAGNOSIS — Z98.890 S/P ABLATION OF ATRIAL FLUTTER: ICD-10-CM

## 2021-10-22 DIAGNOSIS — Q25.1 AORTA COARCTATION: Primary | ICD-10-CM

## 2021-10-22 DIAGNOSIS — Q21.10 RESIDUAL ASD (ATRIAL SEPTAL DEFECT) FOLLOWING REPAIR: ICD-10-CM

## 2021-10-22 PROCEDURE — 99214 OFFICE O/P EST MOD 30 MIN: CPT | Performed by: INTERNAL MEDICINE

## 2021-10-22 NOTE — LETTER
Patient:  Edilberto Ponce   YOB: 1980  Date of Visit: 10/22/2021      Dear Prasanth Henry MD  [address]  [address] [address]  Via In [employer]:      Ms. Toño Martínez is a 37 yo F with h/o pre-hypertension, congential heart disease (surgery at age 11 at [facility]- \"hole\" on the outside), asthma and KAYLI initially seen for frequent and bothersome palpitations. Event monitor demonstrated episodes of SVT with HR > 150 bpm and replaced hyzaar with metoprolol. 8/2014 echo was normal.  Cardiac MRI 10/2016 demonstrated that the previously placed ASD patch with no residual shunting; there was a persistent left SVC between the left atrial appendage and the left superior pulmonary vein to join the coronary sinus. There was also a mild coarctation of the aorta between the ascending and aortic arch in the proximal descending thoracic aorta. The coarctation measured 14 mm in diameter; distal to the coarctation post dilation at 28 mm and proximally 29 mm. The ascending aorta was dilated at 43 x 38 mm. Due to AVNRT and atypical atrial flutter, she saw her EP last year and underwent ablation in 12/2020. Dr. Ismael Casey noted at the end of the procedure there was only nonsustained atrial tachycardia and he recommended medical management and followup. Apparently, her abdominal CT and MRI noted hepatic masses with a background of cirrhosis and she saw hepatology, Dr. Yuki Moses. Apparently, she was told that there was not significant cirrhosis or cancer. She is now following with her primary care physician. At one point, they had recommended right heart catheterization, but she deferred. She also has been having issues with her knee and saw her primary care physician. From a cardiac standpoint, she denies any significant palpitations. This occurs rarely. No exertional chest pain. Her breathing has been improving. She has been compliant with her medications.   Her blood pressure is normal. Assessment and Plan:   1. Paroxysmal supraventricular tachycardia. Stable in sinus rhythm and her symptoms are much better post ablation of AVNRT and atrial flutter. Will have her follow back in six months. She can see Dr. Ashleigh Sheppard as needed. Apparently there were some areas that she was told that Dr. Ashleigh Sheppard could not get to.    2. Aortic coarctation. Noted by MRI in 2016. Dr. Ashleigh Sheppard did recommend that she see Dr. Dina Mcdonald on a regular basis for surveillance of this and her persistent SVC. 3. ASD, status post repair. Echocardiogram in six months with her next visit. 4. Persistent SVC. 5. Tobacco use. Encouraged cessation. 6. Essential hypertension. Blood pressure is controlled. If you have questions, please do not hesitate to call me.     Sincerely,      Arthur Curtis MD

## 2021-10-22 NOTE — PROGRESS NOTES
[address]: [place]  [phone]) [phone]    HPI:  Ms. Piotr Mathews is a 35 yo F with h/o pre-hypertension, congential heart disease (surgery at age 11 at [facility]- \"hole\" on the outside), asthma and KAYLI initially seen for frequent and bothersome palpitations. Event monitor demonstrated episodes of SVT with HR > 150 bpm and replaced hyzaar with metoprolol. 8/2014 echo was normal.  Cardiac MRI 10/2016 demonstrated that the previously placed ASD patch with no residual shunting; there was a persistent left SVC between the left atrial appendage and the left superior pulmonary vein to join the coronary sinus. There was also a mild coarctation of the aorta between the ascending and aortic arch in the proximal descending thoracic aorta. The coarctation measured 14 mm in diameter; distal to the coarctation post dilation at 28 mm and proximally 29 mm. The ascending aorta was dilated at 43 x 38 mm. Due to AVNRT and atypical atrial flutter, she saw her EP last year and underwent ablation in 12/2020. Dr. Devaughn Gregory noted at the end of the procedure there was only nonsustained atrial tachycardia and he recommended medical management and followup. Apparently, her abdominal CT and MRI noted hepatic masses with a background of cirrhosis and she saw hepatology, Dr. Shazia Benites. Apparently, she was told that there was not significant cirrhosis or cancer. She is now following with her primary care physician. At one point, they had recommended right heart catheterization, but she deferred. She also has been having issues with her knee and saw her primary care physician. From a cardiac standpoint, she denies any significant palpitations. This occurs rarely. No exertional chest pain. Her breathing has been improving. She has been compliant with her medications. Her blood pressure is normal.    Assessment and Plan:   1. Paroxysmal supraventricular tachycardia.   Stable in sinus rhythm and her symptoms are much better post ablation of AVNRT and atrial flutter. Will have her follow back in six months. She can see Dr. Ulises Stephens as needed. Apparently there were some areas that she was told that Dr. Ulises Stephens could not get to.    2. Aortic coarctation. Noted by MRI in 2016. Dr. Ulises Stephens did recommend that she see Dr. Monica Martin on a regular basis for surveillance of this and her persistent SVC. 3. ASD, status post repair. Echocardiogram in six months with her next visit. 4. Persistent SVC. 5. Tobacco use. Encouraged cessation. 6. Essential hypertension. Blood pressure is controlled. She  has a past medical history of Allergy, Anxiety, Asthma, and Congenital heart disease. She also has no past medical history of Myocardial infarction [facility]) or [facility] All other systems negative except as above. PE  Vitals:    10/22/21 1044   BP: 120/80   Pulse: (!) 49   Resp: 16   SpO2: 98%   Weight: 250 lb (113.4 kg)   Height: 5' 5\" (1.651 m)    Body mass index is 41.6 kg/m².    General appearance - alert, well appearing, and in no distress  Mental status - affect appropriate to mood  Eyes - sclera anicteric, moist mucous membranes  Neck - supple, no significant adenopathy, no bruits, no JVD  Chest - clear to auscultation, no wheezes, rales or rhonchi  Heart - normal rate, regular rhythm, normal S1, S2, I/VI systolic murmur LUSB  Abdomen - soft, nontender, nondistended, no masses or organomegaly  Extremities - peripheral pulses normal, no pedal edema      Recent Labs:  Lab Results   Component Value Date/Time    Cholesterol, total 193 04/03/2017 10:16 AM    HDL Cholesterol 61 04/03/2017 10:16 AM    LDL, calculated 112 (H) 04/03/2017 10:16 AM    Triglyceride 102 04/03/2017 10:16 AM     Lab Results   Component Value Date/Time    Creatinine 0.81 01/06/2021 01:48 PM     Lab Results   Component Value Date/Time    BUN 13 01/06/2021 01:48 PM     Lab Results   Component Value Date/Time    Potassium 4.3 01/06/2021 01:48 PM     No results found for: HBA1C, OHT7TANR  Lab Results   Component Value Date/Time    HGB 15.4 01/06/2021 01:48 PM     Lab Results   Component Value Date/Time    PLATELET 402 39/53/5735 01:48 PM       Reviewed:  Past Medical History:   Diagnosis Date    Allergy     Anxiety     Asthma     Congenital heart disease      Social History     Tobacco Use   Smoking Status Light Tobacco Smoker    Packs/day: 0.25    Years: 2.00    Pack years: 0.50    Types: Cigarettes   Smokeless Tobacco Never Used   Tobacco Comment    1 pack in 3 weeks      Social History     Substance and Sexual Activity   Alcohol Use Not Currently    Alcohol/week: 0.0 standard drinks    Comment: socially     Allergies   Allergen Reactions    Codeine Unknown (comments)     Patient doesntt recall the reaction    Hydrocodone Other (comments)     She becomes mean     Pcn [Penicillins] Unknown (comments)    Zithromax [Azithromycin] Rash       Current Outpatient Medications   Medication Sig    dilTIAZem ER (Cartia XT) 120 mg capsule Take 1 Capsule by mouth daily.  metoprolol tartrate (LOPRESSOR) 100 mg IR tablet Take 1 Tablet by mouth two (2) times a day. Must be seen in clinic for refills    gabapentin (NEURONTIN) 300 mg capsule Take 1 Capsule by mouth daily. Max Daily Amount: 300 mg.  cloNIDine HCL (CATAPRES) 0.1 mg tablet Take  by mouth two (2) times a day.  aspirin delayed-release 81 mg tablet Take  by mouth daily.  albuterol (PROVENTIL HFA, VENTOLIN HFA, PROAIR HFA) 90 mcg/actuation inhaler Take 1 Puff by inhalation every four (4) hours as needed for Wheezing.  furosemide (LASIX) 40 mg tablet Take  by mouth daily. (Patient not taking: Reported on 10/22/2021)    senna (Senexon) 8.6 mg tablet Take 1 Tablet by mouth daily.  traMADoL (ULTRAM) 50 mg tablet Take 50 mg by mouth every six (6) hours as needed for Pain.  ibuprofen (MOTRIN) 800 mg tablet Take  by mouth.  (Patient not taking: Reported on 10/22/2021)    potassium chloride (Klor-Con) 20 mEq pack Take 20 mEq by mouth two (2) times daily (with meals).  ascorbic acid, vitamin C, (Vitamin C) 500 mg tablet Take  by mouth. (Patient not taking: Reported on 10/22/2021)    vitamin B complex (B COMPLEX-100 PO) Take  by mouth. (Patient not taking: Reported on 10/22/2021)    co-enzyme Q-10 (Co Q-10) 100 mg capsule Take 100 mg by mouth daily. (Patient not taking: Reported on 10/22/2021)    MULTIVITS,CA,MINERALS/IRON/FA (ONE-A-DAY WOMENS FORMULA PO) Take  by mouth. (Patient not taking: Reported on 10/22/2021)    Omega-3-DHA-EPA-Fish Oil 1,000 (120-180) mg cap Take 1,000 mg by mouth daily. (Patient not taking: Reported on 10/22/2021)     No current facility-administered medications for this visit.        Charu Arellano MD  [facility] [place]  [facility] [facility] [address]  [address]

## 2021-11-17 RX ORDER — METOPROLOL TARTRATE 100 MG/1
TABLET ORAL
Qty: 60 TABLET | Refills: 5 | Status: SHIPPED | OUTPATIENT
Start: 2021-11-17 | End: 2022-05-16 | Stop reason: SDUPTHER

## 2021-11-17 NOTE — TELEPHONE ENCOUNTER
Patient requesting a refill on metoprolol tartrate 100 mg      [facility] [phone]

## 2021-11-17 NOTE — TELEPHONE ENCOUNTER
Requested Prescriptions     Signed Prescriptions Disp Refills    metoprolol tartrate (LOPRESSOR) 100 mg IR tablet 60 Tablet 5     Sig: TAKE ONE TABLET BY MOUTH TWICE A DAY     Authorizing Provider: Sandra Archibald     Ordering User: Tabitha Lesches       Last office visit 10/22/2021    Per Dr. Calle Oar   Date Time Provider Bassem Jack   4/22/2022 11:00 AM NAI GÓMEZ   4/22/2022 11:40 AM MD MARIO Kuo BS AMB

## 2022-03-17 DIAGNOSIS — R52 PAIN: ICD-10-CM

## 2022-03-17 RX ORDER — GABAPENTIN 300 MG/1
CAPSULE ORAL
Qty: 30 CAPSULE | Refills: 5 | Status: SHIPPED | OUTPATIENT
Start: 2022-03-17 | End: 2022-05-16

## 2022-03-18 PROBLEM — R16.0 LIVER MASS: Status: ACTIVE | Noted: 2021-03-01

## 2022-03-18 PROBLEM — Q21.10 RESIDUAL ASD (ATRIAL SEPTAL DEFECT) FOLLOWING REPAIR: Status: ACTIVE | Noted: 2017-07-26

## 2022-03-19 PROBLEM — Q25.1 AORTA COARCTATION: Status: ACTIVE | Noted: 2017-07-26

## 2022-03-19 PROBLEM — I47.10 PAROXYSMAL SVT (SUPRAVENTRICULAR TACHYCARDIA): Status: ACTIVE | Noted: 2017-07-26

## 2022-03-19 PROBLEM — I47.1 PAROXYSMAL SVT (SUPRAVENTRICULAR TACHYCARDIA) (HCC): Status: ACTIVE | Noted: 2017-07-26

## 2022-03-20 PROBLEM — E66.01 SEVERE OBESITY (HCC): Status: ACTIVE | Noted: 2019-02-11

## 2022-03-20 PROBLEM — K76.1 CHRONIC PASSIVE HEPATIC CONGESTION: Status: ACTIVE | Noted: 2021-03-01

## 2022-04-15 RX ORDER — DILTIAZEM HYDROCHLORIDE 120 MG/1
CAPSULE, COATED, EXTENDED RELEASE ORAL
Qty: 90 CAPSULE | Refills: 1 | Status: SHIPPED | OUTPATIENT
Start: 2022-04-15 | End: 2022-05-16 | Stop reason: SDUPTHER

## 2022-04-15 NOTE — TELEPHONE ENCOUNTER
Request for Diltiazem 120 mg daily. Last office visit 10/22/21, next office visit 4/22/22. Refills per verbal order from Dr. Malcom Casiano.

## 2022-05-16 ENCOUNTER — OFFICE VISIT (OUTPATIENT)
Dept: URGENT CARE | Age: 42
End: 2022-05-16
Payer: COMMERCIAL

## 2022-05-16 ENCOUNTER — TELEPHONE (OUTPATIENT)
Dept: CARDIOLOGY CLINIC | Age: 42
End: 2022-05-16

## 2022-05-16 VITALS
TEMPERATURE: 98.4 F | HEART RATE: 64 BPM | SYSTOLIC BLOOD PRESSURE: 162 MMHG | RESPIRATION RATE: 16 BRPM | HEIGHT: 66 IN | BODY MASS INDEX: 41.46 KG/M2 | WEIGHT: 258 LBS | DIASTOLIC BLOOD PRESSURE: 107 MMHG | OXYGEN SATURATION: 95 %

## 2022-05-16 DIAGNOSIS — I47.1 PSVT (PAROXYSMAL SUPRAVENTRICULAR TACHYCARDIA) (HCC): Primary | ICD-10-CM

## 2022-05-16 DIAGNOSIS — I15.9 HYPERTENSION, SECONDARY: ICD-10-CM

## 2022-05-16 DIAGNOSIS — Z20.822 SUSPECTED COVID-19 VIRUS INFECTION: Primary | ICD-10-CM

## 2022-05-16 DIAGNOSIS — J45.41 MODERATE PERSISTENT ASTHMATIC BRONCHITIS WITH ACUTE EXACERBATION: ICD-10-CM

## 2022-05-16 DIAGNOSIS — J45.20 MILD INTERMITTENT ASTHMA WITHOUT COMPLICATION: ICD-10-CM

## 2022-05-16 LAB — SARS-COV-2 PCR, POC: NEGATIVE

## 2022-05-16 PROCEDURE — 87635 SARS-COV-2 COVID-19 AMP PRB: CPT | Performed by: FAMILY MEDICINE

## 2022-05-16 PROCEDURE — 99203 OFFICE O/P NEW LOW 30 MIN: CPT | Performed by: FAMILY MEDICINE

## 2022-05-16 RX ORDER — ALBUTEROL SULFATE 90 UG/1
1 AEROSOL, METERED RESPIRATORY (INHALATION)
Qty: 1 EACH | Refills: 0 | Status: SHIPPED | OUTPATIENT
Start: 2022-05-16

## 2022-05-16 RX ORDER — METOPROLOL TARTRATE 100 MG/1
100 TABLET ORAL 2 TIMES DAILY
Qty: 60 TABLET | Refills: 5 | Status: SHIPPED | OUTPATIENT
Start: 2022-05-16

## 2022-05-16 RX ORDER — DILTIAZEM HYDROCHLORIDE 120 MG/1
120 CAPSULE, COATED, EXTENDED RELEASE ORAL DAILY
Qty: 90 CAPSULE | Refills: 1 | Status: SHIPPED | OUTPATIENT
Start: 2022-05-16 | End: 2022-10-12

## 2022-05-16 RX ORDER — CLONIDINE HYDROCHLORIDE 0.1 MG/1
0.1 TABLET ORAL 2 TIMES DAILY
Qty: 60 TABLET | Refills: 1 | Status: SHIPPED | OUTPATIENT
Start: 2022-05-16 | End: 2022-05-16

## 2022-05-16 RX ORDER — BENZONATATE 200 MG/1
200 CAPSULE ORAL
Qty: 21 CAPSULE | Refills: 0 | Status: SHIPPED | OUTPATIENT
Start: 2022-05-16 | End: 2022-05-23

## 2022-05-16 RX ORDER — IPRATROPIUM BROMIDE AND ALBUTEROL SULFATE 2.5; .5 MG/3ML; MG/3ML
3 SOLUTION RESPIRATORY (INHALATION)
Qty: 30 NEBULE | Refills: 0 | Status: SHIPPED | OUTPATIENT
Start: 2022-05-16

## 2022-05-16 RX ORDER — IPRATROPIUM BROMIDE AND ALBUTEROL SULFATE 2.5; .5 MG/3ML; MG/3ML
3 SOLUTION RESPIRATORY (INHALATION)
Status: COMPLETED | OUTPATIENT
Start: 2022-05-16 | End: 2022-05-16

## 2022-05-16 RX ORDER — PREDNISONE 10 MG/1
TABLET ORAL
Qty: 21 TABLET | Refills: 0 | Status: SHIPPED | OUTPATIENT
Start: 2022-05-16

## 2022-05-16 RX ORDER — PROMETHAZINE HYDROCHLORIDE AND DEXTROMETHORPHAN HYDROBROMIDE 6.25; 15 MG/5ML; MG/5ML
5 SYRUP ORAL
Qty: 100 ML | Refills: 0 | Status: SHIPPED | OUTPATIENT
Start: 2022-05-16

## 2022-05-16 RX ADMIN — IPRATROPIUM BROMIDE AND ALBUTEROL SULFATE 3 ML: 2.5; .5 SOLUTION RESPIRATORY (INHALATION) at 11:08

## 2022-05-16 NOTE — TELEPHONE ENCOUNTER
Called pt and identified with 2 pt identifiers. Pt stated that she called this morning and asked for clonidine and metoprolol to be refilled. She realized this afternoon that when she asked for clonidine she meant to ask for diltiazem as Dr. Jonathan Zhang made that switch for her. She is no longer taking clonidine. She stated that she will continue to take diltiazem and metoprolol. She asked for a refill for her diltiazem. I contacted the pharmacy and verified that they received a refill request on 04/15/2022. They stated that they did receive the prescription but that they have been out of the medication. When they receive the medication back in stock they will contact the pt. I called the pt and let her know that her [facility] pharmacy is out of the diltiazem and asked if she would like a prescription sent to a mail in pharmacy or another pharmacy. She asked for it to be called to [facility] at [phone]. Called [facility] to confirm that they have it in stock and they do. RX sent to [facility] on [address] per VO per MD and pt notified.     Future Appointments   Date Time Provider Bassem Jack   5/23/2022  1:20 PM MD MARIO Flynn AMB [facility]

## 2022-05-16 NOTE — LETTER
NOTIFICATION RETURN TO WORK / SCHOOL    5/16/2022 11:19 AM    Ms. Roc Piña  [address] [id number]      To Whom It May Concern:    Roc Piña is currently under the care of [address]. She will return to work from 5/18/22. If there are questions or concerns please have the patient contact our office.         Sincerely,      JONAE PROVIDER

## 2022-05-16 NOTE — TELEPHONE ENCOUNTER
Pt would like a refill on her metoprolol and clonidine. Pt has an appt scheduled. Pt is almost out of medication.     Future Appointments   Date Time Provider Bassem Jack   5/23/2022  1:20 PM Yessenia Flynnr: [phone]

## 2022-05-16 NOTE — TELEPHONE ENCOUNTER
Patient stated she received the wrong medication, please contact patient to go over the correct medications, please advise          [phone]

## 2022-05-17 RX ORDER — NEBULIZER AND COMPRESSOR
1 EACH MISCELLANEOUS
Qty: 1 EACH | Refills: 0 | Status: SHIPPED | OUTPATIENT
Start: 2022-05-17 | End: 2022-05-17 | Stop reason: SDUPTHER

## 2022-05-17 RX ORDER — NEBULIZER AND COMPRESSOR
1 EACH MISCELLANEOUS
Qty: 1 EACH | Refills: 0 | Status: SHIPPED | OUTPATIENT
Start: 2022-05-17

## 2022-05-17 NOTE — PROGRESS NOTES
Cough  The history is provided by the patient. This is a new problem. The current episode started yesterday. The problem occurs constantly. The problem has been rapidly worsening. The cough is non-productive. There has been no fever. Associated symptoms include ear congestion, headaches, shortness of breath and wheezing. She has tried nothing for the symptoms. Risk factors: no covid exposure. She is not a smoker. Her past medical history is significant for bronchitis. Her past medical history does not include asthma.         Past Medical History:   Diagnosis Date    Allergy     Anxiety     Asthma     Congenital heart disease         Past Surgical History:   Procedure Laterality Date    HX DILATION AND CURETTAGE      endometriosis and precervical ca    HX HEENT      tonsillectomy,DNS sx    HX ORTHOPAEDIC  2021    right tibia fx,no sx needed    IR BX LIVER PERCUTANEOUS  2/4/2021    IR BX TRANSCATHETER  2/4/2021    [place] COMPRE ELECTROPHYSIOLOGIC ARRHYTHMIA INDUCTION N/A 12/9/2020    EP STUDY COMPLETE performed by Charles Sanchez MD at [address] CATH LAB     [address] ADD ON N/A 12/9/2020    Ablation Svt/Vt Add On performed by Charles Sanchez MD at [address] CATH LAB    [place] KNEE SCOPE, ALLOGRAFT IMPANT           Family History   Problem Relation Age of Onset    Hypertension Mother     Colon Cancer Father         Social History     Socioeconomic History    Marital status:      Spouse name: Not on file    Number of children: Not on file    Years of education: Not on file    Highest education level: Not on file   Occupational History    Not on file   Tobacco Use    Smoking status: Light Tobacco Smoker     Packs/day: 0.25     Years: 2.00     Pack years: 0.50     Types: Cigarettes    Smokeless tobacco: Never Used    Tobacco comment: 1 pack in 3 weeks    Substance and Sexual Activity    Alcohol use: Not Currently     Alcohol/week: 0.0 standard drinks     Comment: socially  Drug use: Yes     Types: Marijuana    Sexual activity: Not Currently     Birth control/protection: None     Comment: ,works in [employer]   Other Topics Concern    Not on file   Social History Narrative    Not on file     Social Determinants of Health     Financial Resource Strain:     Difficulty of Paying Living Expenses: Not on file   Food Insecurity:     Worried About Running Out of Food in the Last Year: Not on file    Elijah of Food in the Last Year: Not on file   Transportation Needs:     Lack of Transportation (Medical): Not on file    Lack of Transportation (Non-Medical): Not on file   Physical Activity:     Days of Exercise per Week: Not on file    Minutes of Exercise per Session: Not on file   Stress:     Feeling of Stress : Not on file   Social Connections:     Frequency of Communication with Friends and Family: Not on file    Frequency of Social Gatherings with Friends and Family: Not on file    Attends Roman Catholic Services: Not on file    Active Member of [address] [place] [address] or Organizations: Not on file    Attends Club or Organization Meetings: Not on file    Marital Status: Not on file   Intimate Partner Violence:     Fear of Current or Ex-Partner: Not on file    Emotionally Abused: Not on file    Physically Abused: Not on file    Sexually Abused: Not on file   Housing Stability:     Unable to Pay for Housing in the Last Year: Not on file    Number of [place] in the Last Year: Not on file    Unstable Housing in the Last Year: Not on file                ALLERGIES: Codeine, Hydrocodone, Pcn [penicillins], and Zithromax [azithromycin]    Review of Systems   Constitutional: Positive for fatigue. Negative for fever. HENT: Positive for congestion and sinus pressure. Respiratory: Positive for cough, chest tightness, shortness of breath and wheezing. Neurological: Positive for headaches. All other systems reviewed and are negative.       Vitals:    05/16/22 0949   BP: (!) 162/107   Pulse: 64   Resp: 16   Temp: 98.4 °F (36.9 °C)   SpO2: 95%   Weight: 258 lb (117 kg)   Height: 5' 5.5\" (1.664 m)       Physical Exam  Vitals and nursing note reviewed. Constitutional:       General: She is not in acute distress. HENT:      Right Ear: Tympanic membrane and ear canal normal.      Left Ear: Tympanic membrane and ear canal normal.      Nose: Nose normal.      Mouth/Throat:      Pharynx: No oropharyngeal exudate or posterior oropharyngeal erythema. Eyes:      General:         Right eye: No discharge. Left eye: No discharge. Conjunctiva/sclera: Conjunctivae normal.   Cardiovascular:      Pulses: Normal pulses. Heart sounds: Normal heart sounds. Pulmonary:      Effort: Pulmonary effort is normal. No respiratory distress. Breath sounds: Decreased breath sounds, wheezing and rhonchi present. No rales. Musculoskeletal:      Cervical back: Neck supple. Lymphadenopathy:      Cervical: No cervical adenopathy. Skin:     Findings: No rash. MDM    Procedures             ICD-10-CM ICD-9-CM    1. Suspected COVID-19 virus infection  Z20.822 V01.79 POCT COVID-19, SARS-COV-2, PCR   2. Moderate persistent asthmatic bronchitis with acute exacerbation  J45.41 493.92    3. Mild intermittent asthma without complication  H67.81 372.65 albuterol (PROVENTIL HFA, VENTOLIN HFA, PROAIR HFA) 90 mcg/actuation inhaler       Improved after Neb Rx   Medications Ordered Today   Medications    predniSONE (STERAPRED DS) 10 mg dose pack     Sig: As directed     Dispense:  21 Tablet     Refill:  0    benzonatate (TESSALON) 200 mg capsule     Sig: Take 1 Capsule by mouth three (3) times daily as needed for Cough for up to 7 days. Dispense:  21 Capsule     Refill:  0    promethazine-dextromethorphan (PROMETHAZINE-DM) 6.25-15 mg/5 mL syrup     Sig: Take 5 mL by mouth four (4) times daily as needed for Cough.      Dispense:  100 mL     Refill:  0    albuterol (PROVENTIL HFA, VENTOLIN HFA, PROAIR HFA) 90 mcg/actuation inhaler     Sig: Take 1 Puff by inhalation every four (4) hours as needed for Wheezing. Dispense:  1 Each     Refill:  0    albuterol-ipratropium (DUO-NEB) 2.5 MG-0.5 MG/3 ML     Order Specific Question:   MODE OF DELIVERY     Answer:   Nebulizer     Order Specific Question:   Initiate RT Bronchodilator Protocol     Answer:   No    albuterol-ipratropium (DUO-NEB) 2.5 mg-0.5 mg/3 ml nebu     Sig: 3 mL by Nebulization route every six (6) hours as needed for Wheezing. Dispense:  30 Nebule     Refill:  0     Results for orders placed or performed in visit on 05/16/22   POCT COVID-19, SARS-COV-2, PCR   Result Value Ref Range    SARS-COV-2 PCR, POC Negative Negative     The patients condition was discussed with the patient and they understand. The patient is to follow up with primary care doctor. If signs and symptoms become worse the pt is to go to the ER. The patient is to take medications as prescribed.

## 2022-05-23 ENCOUNTER — OFFICE VISIT (OUTPATIENT)
Dept: CARDIOLOGY CLINIC | Age: 42
End: 2022-05-23
Payer: COMMERCIAL

## 2022-05-23 VITALS
SYSTOLIC BLOOD PRESSURE: 150 MMHG | HEART RATE: 57 BPM | WEIGHT: 255 LBS | OXYGEN SATURATION: 96 % | RESPIRATION RATE: 18 BRPM | DIASTOLIC BLOOD PRESSURE: 98 MMHG | HEIGHT: 66 IN | BODY MASS INDEX: 40.98 KG/M2

## 2022-05-23 DIAGNOSIS — Q21.10 RESIDUAL ASD (ATRIAL SEPTAL DEFECT) FOLLOWING REPAIR: ICD-10-CM

## 2022-05-23 DIAGNOSIS — I47.1 PAROXYSMAL SVT (SUPRAVENTRICULAR TACHYCARDIA) (HCC): ICD-10-CM

## 2022-05-23 DIAGNOSIS — E66.01 OBESITY, CLASS III, BMI 40-49.9 (MORBID OBESITY) (HCC): ICD-10-CM

## 2022-05-23 DIAGNOSIS — R06.02 SHORT OF BREATH ON EXERTION: Primary | ICD-10-CM

## 2022-05-23 DIAGNOSIS — Q25.1 AORTA COARCTATION: ICD-10-CM

## 2022-05-23 PROCEDURE — 93000 ELECTROCARDIOGRAM COMPLETE: CPT | Performed by: INTERNAL MEDICINE

## 2022-05-23 PROCEDURE — 99214 OFFICE O/P EST MOD 30 MIN: CPT | Performed by: INTERNAL MEDICINE

## 2022-05-23 RX ORDER — FUROSEMIDE 40 MG/1
40 TABLET ORAL DAILY
Qty: 90 TABLET | Refills: 1 | Status: SHIPPED | OUTPATIENT
Start: 2022-05-23

## 2022-05-23 RX ORDER — FUROSEMIDE 20 MG/1
20 TABLET ORAL DAILY
COMMUNITY
End: 2022-05-23 | Stop reason: SDUPTHER

## 2022-05-23 NOTE — PATIENT INSTRUCTIONS
Please increase your Lasix to 40 mg once daily.   We will call you with the results of your echocardiogram.

## 2022-05-23 NOTE — LETTER
Patient:  Bryant Walter   YOB: 1980  Date of Visit: 5/23/2022    Dear MD [address]  [facility] [address]  Via In Basket:    Ms. Star Madera is a 38 yo F with h/o pre-hypertension, h/o ADS s/p patch at [facility] at age 11, cardiac MRI 10/2016 noted previous patch with no residual shunting, h/o AVNRT and atypical atrial flutter s/p ablation 12/2020 with Dr. Juanjose Laurent, asthma and KAYLI. Cardiac MRI 10/2016 also noted persistent left SVC between the left atrial appendage and the left superior pulmonary vein to join the coronary sinus; mild coarctation of the aorta between the ascending and aortic arch in the proximal descending thoracic aorta. The coarctation measured 14 mm in diameter; distal to the coarctation post dilation at 28 mm and proximally 29 mm. The ascending aorta was dilated at 43 x 38 mm. She was seen by [facility] Dr. Hulda Spatz. She has been having a lot of cough and congestion over the last two weeks and last Monday ended up going to [place] and was diagnosed with bronchitis. Apparently she had an MRI that was done that demonstrated some fluid around the heart and there was a question of CHF. However, the remainder of her workup including EKG was unrevealing and she was told she could follow up with her doctors outpatient. She apparently was placed on antibiotics and Prednisone and says this helped, but she is still having persistent symptoms. She does say she has had bronchitis in the past that felt the same way. This last occurred approximately two years ago. She denies any exertional chest pain. Today in the office here, she is still having significant cough and congestion. She was given a prescription for Lasix, but this is a low dose of 20 mg. On exam, her lungs are clear, though she does have slightly decreased breath sounds in the right base. She just has trace lower extremity edema.   Her EKG here is normal sinus rhythm, nonspecific ST-T wave abnormality. Assessment and Plan:   1. Shortness of breath. Her presentation is most consistent with respiratory URI/bronchitis with cough and congestion. She did have some response with antibiotics and steroids. Given the findings on her MRI and question of CHF, will go ahead and obtain an echocardiogram.  Will also do a trial of increased Lasix from 20 to 40 mg once daily and see if this helps. Otherwise, she will finish her antibiotics and continue her inhalers. 2. Paroxysmal supraventricular tachycardia. She is status post AVNRT ablation and for flutter. Followed by Dr. Jarrett Apley in the past.    3. Aortic coarctation. Noted by MRI in 2016. Dr. Jarrett Apley did recommend she see Dr. Harwood Lanes on a regular basis for surveillance of this, as well as her persistent SVC. 4. ASD, status post repair. Plan was for echocardiogram in the near future, but will go ahead and get this now. 5. Persistent SVC. 6. Tobacco use. Encouraged cessation. 7. Essential hypertension. Blood pressure is controlled. If you have questions, please do not hesitate to call me.      Sincerely,      Nirav Coffey MD

## 2022-05-23 NOTE — PROGRESS NOTES
[facility] [address]: Amna Douglas  [phone]) [phone]    HPI:  Ms. Nelida Gasca is a 38 yo F with h/o pre-hypertension, h/o ADS s/p patch at [facility] at age 11, cardiac MRI 10/2016 noted previous patch with no residual shunting, h/o AVNRT and atypical atrial flutter s/p ablation 12/2020 with Dr. Bryson, asthma and KAYLI. Cardiac MRI 10/2016 also noted persistent left SVC between the left atrial appendage and the left superior pulmonary vein to join the coronary sinus; mild coarctation of the aorta between the ascending and aortic arch in the proximal descending thoracic aorta. The coarctation measured 14 mm in diameter; distal to the coarctation post dilation at 28 mm and proximally 29 mm. The ascending aorta was dilated at 43 x 38 mm. She was seen by [facility] Dr. Falguni Paula. She has been having a lot of cough and congestion over the last two weeks and last Monday ended up going to [facility] and was diagnosed with bronchitis. Apparently she had an MRI that was done that demonstrated some fluid around the heart and there was a question of CHF. However, the remainder of her workup including EKG was unrevealing and she was told she could follow up with her doctors outpatient. She apparently was placed on antibiotics and Prednisone and says this helped, but she is still having persistent symptoms. She does say she has had bronchitis in the past that felt the same way. This last occurred approximately two years ago. She denies any exertional chest pain. Today in the office here, she is still having significant cough and congestion. She was given a prescription for Lasix, but this is a low dose of 20 mg. On exam, her lungs are clear, though she does have slightly decreased breath sounds in the right base. She just has trace lower extremity edema. Her EKG here is normal sinus rhythm, nonspecific ST-T wave abnormality. Assessment and Plan:   1. Shortness of breath.   Her presentation is most consistent with respiratory URI/bronchitis with cough and congestion. She did have some response with antibiotics and steroids. Given the findings on her MRI and question of CHF, will go ahead and obtain an echocardiogram.  Will also do a trial of increased Lasix from 20 to 40 mg once daily and see if this helps. Otherwise, she will finish her antibiotics and continue her inhalers. 2. Paroxysmal supraventricular tachycardia. She is status post AVNRT ablation and for flutter. Followed by Dr. Eliel Jane in the past.    3. Aortic coarctation. Noted by MRI in 2016. Dr. Eliel Jane did recommend she see Dr. Chaitanya Gaxiola on a regular basis for surveillance of this, as well as her persistent SVC. 4. ASD, status post repair. Plan was for echocardiogram in the near future, but will go ahead and get this now. 5. Persistent SVC. 6. Tobacco use. Encouraged cessation. 7. Essential hypertension. Blood pressure is controlled. She  has a past medical history of Allergy, Anxiety, Asthma, and Congenital heart disease. She has no past medical history of Myocardial infarction [facility]) or Pulmonary embolism ([facility] [address] All other systems negative except as above. PE  Vitals:    05/23/22 1328   BP: (!) 150/98   Pulse: (!) 57   Resp: 18   SpO2: 96%   Weight: 255 lb (115.7 kg)   Height: 5' 5.5\" (1.664 m)    Body mass index is 41.79 kg/m².    General appearance - alert, well appearing, and in no distress  Mental status - affect appropriate to mood  Eyes - sclera anicteric, moist mucous membranes  Neck - supple, no significant adenopathy, no bruits, no JVD  Chest - clear, decreased BS right base  Heart - normal rate, regular rhythm, normal S1, S2, I/VI systolic murmur LUSB  Abdomen - soft, nontender, nondistended, no masses or organomegaly  Extremities - peripheral pulses normal, trace pedal edema      Recent Labs:  Lab Results   Component Value Date/Time    Cholesterol, total 193 04/03/2017 10:16 AM    HDL Cholesterol 61 04/03/2017 10:16 AM    LDL, calculated 112 (H) 04/03/2017 10:16 AM Triglyceride 102 04/03/2017 10:16 AM     Lab Results   Component Value Date/Time    Creatinine 0.81 01/06/2021 01:48 PM     Lab Results   Component Value Date/Time    BUN 13 01/06/2021 01:48 PM     Lab Results   Component Value Date/Time    Potassium 4.3 01/06/2021 01:48 PM     No results found for: HBA1C, [id number]  Lab Results   Component Value Date/Time    HGB 15.4 01/06/2021 01:48 PM     Lab Results   Component Value Date/Time    PLATELET 345 67/19/3805 01:48 PM       Reviewed:  Past Medical History:   Diagnosis Date    Allergy     Anxiety     Asthma     Congenital heart disease      Social History     Tobacco Use   Smoking Status Light Tobacco Smoker    Packs/day: 0.25    Years: 2.00    Pack years: 0.50    Types: Cigarettes   Smokeless Tobacco Never Used   Tobacco Comment    2-3 a week     Social History     Substance and Sexual Activity   Alcohol Use Yes    Alcohol/week: 7.0 standard drinks    Types: 7 Glasses of wine per week    Comment: 1 wine a day     Allergies   Allergen Reactions    Codeine Unknown (comments)     Patient doesntt recall the reaction    Hydrocodone Other (comments)     She becomes mean     Pcn [Penicillins] Unknown (comments)    Zithromax [Azithromycin] Rash       Current Outpatient Medications   Medication Sig    furosemide (Lasix) 20 mg tablet Take 20 mg by mouth daily.  Nebulizer & Compressor machine 1 Each by Does Not Apply route every four (4) hours as needed for Cough.  metoprolol tartrate (LOPRESSOR) 100 mg IR tablet Take 1 Tablet by mouth two (2) times a day.  benzonatate (TESSALON) 200 mg capsule Take 1 Capsule by mouth three (3) times daily as needed for Cough for up to 7 days.  promethazine-dextromethorphan (PROMETHAZINE-DM) 6.25-15 mg/5 mL syrup Take 5 mL by mouth four (4) times daily as needed for Cough.     albuterol (PROVENTIL HFA, VENTOLIN HFA, PROAIR HFA) 90 mcg/actuation inhaler Take 1 Puff by inhalation every four (4) hours as needed for Wheezing.  albuterol-ipratropium (DUO-NEB) 2.5 mg-0.5 mg/3 ml nebu 3 mL by Nebulization route every six (6) hours as needed for Wheezing.  dilTIAZem ER (CARDIZEM CD) 120 mg capsule Take 1 Capsule by mouth daily.  ascorbic acid, vitamin C, (Vitamin C) 500 mg tablet Take  by mouth.  vitamin B complex (B COMPLEX-100 PO) Take  by mouth.  co-enzyme Q-10 (Co Q-10) 100 mg capsule Take 100 mg by mouth daily.  MULTIVITS,CA,MINERALS/IRON/FA (ONE-A-DAY WOMENS FORMULA PO) Take  by mouth.  Omega-3-DHA-EPA-Fish Oil 1,000 (120-180) mg cap Take 1,000 mg by mouth daily.  predniSONE (STERAPRED DS) 10 mg dose pack As directed (Patient not taking: Reported on 5/23/2022)     No current facility-administered medications for this visit.        Sivan Maurice MD  [facility] [place]  [address] [facility] [address]  [address]

## 2022-05-25 ENCOUNTER — DOCUMENTATION ONLY (OUTPATIENT)
Dept: INTERNAL MEDICINE CLINIC | Age: 42
End: 2022-05-25

## 2022-05-25 NOTE — PROGRESS NOTES
Received medical record request from [facility] on 5/24/22  Faxed request to Arthur on 5/25/22 and scanned in chart

## 2022-06-24 ENCOUNTER — ANCILLARY PROCEDURE (OUTPATIENT)
Dept: CARDIOLOGY CLINIC | Age: 42
End: 2022-06-24
Payer: COMMERCIAL

## 2022-06-24 VITALS
BODY MASS INDEX: 40.98 KG/M2 | SYSTOLIC BLOOD PRESSURE: 150 MMHG | WEIGHT: 255 LBS | DIASTOLIC BLOOD PRESSURE: 98 MMHG | HEIGHT: 66 IN

## 2022-06-24 DIAGNOSIS — R06.02 SHORT OF BREATH ON EXERTION: ICD-10-CM

## 2022-06-24 DIAGNOSIS — Q25.1 AORTA COARCTATION: ICD-10-CM

## 2022-06-24 DIAGNOSIS — E66.01 OBESITY, CLASS III, BMI 40-49.9 (MORBID OBESITY) (HCC): ICD-10-CM

## 2022-06-24 DIAGNOSIS — Q21.10 RESIDUAL ASD (ATRIAL SEPTAL DEFECT) FOLLOWING REPAIR: ICD-10-CM

## 2022-06-24 DIAGNOSIS — I47.1 PAROXYSMAL SVT (SUPRAVENTRICULAR TACHYCARDIA) (HCC): ICD-10-CM

## 2022-06-24 LAB
ECHO AO ASC DIAM: 4 CM
ECHO AO ASCENDING AORTA INDEX: 1.81 CM/M2
ECHO AO ROOT DIAM: 3.4 CM
ECHO AO ROOT INDEX: 1.54 CM/M2
ECHO AV PEAK GRADIENT: 15 MMHG
ECHO AV PEAK VELOCITY: 1.9 M/S
ECHO AV VELOCITY RATIO: 0.58
ECHO EST RA PRESSURE: 3 MMHG
ECHO LA DIAMETER INDEX: 2.31 CM/M2
ECHO LA DIAMETER: 5.1 CM
ECHO LA TO AORTIC ROOT RATIO: 1.5
ECHO LA VOL 2C: 157 ML (ref 22–52)
ECHO LA VOL 4C: 173 ML (ref 22–52)
ECHO LA VOL BP: 179 ML (ref 22–52)
ECHO LA VOL/BSA BIPLANE: 81 ML/M2 (ref 16–34)
ECHO LA VOLUME AREA LENGTH: 186 ML
ECHO LA VOLUME INDEX A2C: 71 ML/M2 (ref 16–34)
ECHO LA VOLUME INDEX A4C: 78 ML/M2 (ref 16–34)
ECHO LA VOLUME INDEX AREA LENGTH: 84 ML/M2 (ref 16–34)
ECHO LV E' LATERAL VELOCITY: 9 CM/S
ECHO LV E' SEPTAL VELOCITY: 7 CM/S
ECHO LV FRACTIONAL SHORTENING: 27 % (ref 28–44)
ECHO LV INTERNAL DIMENSION DIASTOLE INDEX: 2.04 CM/M2
ECHO LV INTERNAL DIMENSION DIASTOLIC: 4.5 CM (ref 3.9–5.3)
ECHO LV INTERNAL DIMENSION SYSTOLIC INDEX: 1.49 CM/M2
ECHO LV INTERNAL DIMENSION SYSTOLIC: 3.3 CM
ECHO LV ISOVOLUMETRIC RELAXATION TIME (IVRT): 78 MS
ECHO LV IVSD: 1.5 CM (ref 0.6–0.9)
ECHO LV MASS 2D: 275.8 G (ref 67–162)
ECHO LV MASS INDEX 2D: 124.8 G/M2 (ref 43–95)
ECHO LV POSTERIOR WALL DIASTOLIC: 1.5 CM (ref 0.6–0.9)
ECHO LV RELATIVE WALL THICKNESS RATIO: 0.67
ECHO LVOT PEAK GRADIENT: 5 MMHG
ECHO LVOT PEAK VELOCITY: 1.1 M/S
ECHO MV "A" WAVE DURATION: 171.3 MSEC
ECHO MV A VELOCITY: 1.1 M/S
ECHO MV E DECELERATION TIME (DT): 295.8 MS
ECHO MV E VELOCITY: 1.31 M/S
ECHO MV E/A RATIO: 1.19
ECHO MV E/E' LATERAL: 14.56
ECHO MV E/E' RATIO (AVERAGED): 16.63
ECHO MV E/E' SEPTAL: 18.71
ECHO RIGHT VENTRICULAR SYSTOLIC PRESSURE (RVSP): 31 MMHG
ECHO RV FREE WALL PEAK S': 17 CM/S
ECHO RV TAPSE: 2.5 CM (ref 1.7–?)
ECHO TV REGURGITANT MAX VELOCITY: 2.63 M/S
ECHO TV REGURGITANT PEAK GRADIENT: 28 MMHG

## 2022-06-24 PROCEDURE — 93306 TTE W/DOPPLER COMPLETE: CPT | Performed by: INTERNAL MEDICINE

## 2022-06-28 ENCOUNTER — TELEPHONE (OUTPATIENT)
Dept: CARDIOLOGY CLINIC | Age: 42
End: 2022-06-28

## 2022-06-28 NOTE — TELEPHONE ENCOUNTER
Jammie Courier, MD Merrie Krabbe, DANAY  Please let pt know echo was overall ok and unchanged from prior. Normal EF. Normal ASD repair. thx      Two patient identifiers verified. Per MD patient called and given results. . Patient verbalized understanding and denies any further questions or concerns at this time. No future appointments.

## 2022-07-15 ENCOUNTER — DOCUMENTATION ONLY (OUTPATIENT)
Dept: INTERNAL MEDICINE CLINIC | Age: 42
End: 2022-07-15

## 2022-07-15 NOTE — PROGRESS NOTES
Received fax from [facility] for [address] and faxed to [facility]

## 2022-07-21 DIAGNOSIS — I47.1 PSVT (PAROXYSMAL SUPRAVENTRICULAR TACHYCARDIA) (HCC): ICD-10-CM

## 2022-07-21 DIAGNOSIS — I15.9 HYPERTENSION, SECONDARY: ICD-10-CM

## 2022-07-26 RX ORDER — CLONIDINE HYDROCHLORIDE 0.1 MG/1
TABLET ORAL
Qty: 60 TABLET | Refills: 1 | OUTPATIENT
Start: 2022-07-26

## 2022-10-12 DIAGNOSIS — I47.1 PSVT (PAROXYSMAL SUPRAVENTRICULAR TACHYCARDIA) (HCC): ICD-10-CM

## 2022-10-12 RX ORDER — DILTIAZEM HYDROCHLORIDE 120 MG/1
CAPSULE, COATED, EXTENDED RELEASE ORAL
Qty: 90 CAPSULE | Refills: 1 | Status: SHIPPED | OUTPATIENT
Start: 2022-10-12

## 2022-11-10 DIAGNOSIS — I15.9 HYPERTENSION, SECONDARY: ICD-10-CM

## 2022-11-10 DIAGNOSIS — I47.1 PSVT (PAROXYSMAL SUPRAVENTRICULAR TACHYCARDIA) (HCC): ICD-10-CM

## 2022-11-10 RX ORDER — METOPROLOL TARTRATE 100 MG/1
TABLET ORAL
Qty: 180 TABLET | Refills: 1 | Status: SHIPPED | OUTPATIENT
Start: 2022-11-10

## 2022-11-18 ENCOUNTER — VIRTUAL VISIT (OUTPATIENT)
Dept: INTERNAL MEDICINE CLINIC | Age: 42
End: 2022-11-18
Payer: COMMERCIAL

## 2022-11-18 DIAGNOSIS — R41.840 DIFFICULTY CONCENTRATING: Primary | ICD-10-CM

## 2022-11-18 DIAGNOSIS — J45.20 MILD INTERMITTENT ASTHMA WITHOUT COMPLICATION: ICD-10-CM

## 2022-11-18 PROCEDURE — 99213 OFFICE O/P EST LOW 20 MIN: CPT | Performed by: INTERNAL MEDICINE

## 2022-11-18 RX ORDER — ALBUTEROL SULFATE 90 UG/1
1 AEROSOL, METERED RESPIRATORY (INHALATION)
Qty: 1 EACH | Refills: 0 | Status: SHIPPED | OUTPATIENT
Start: 2022-11-18

## 2022-11-18 NOTE — PROGRESS NOTES
1. \"Have you been to the ER, urgent care clinic since your last visit? Hospitalized since your last visit? \" No    2. \"Have you seen or consulted any other health care providers outside of the [address] [place] [address] since your last visit? \" No     3. For patients aged 39-70: Has the patient had a colonoscopy / FIT/ Cologuard? NA - based on age      If the patient is female:    4. For patients aged 41-77: Has the patient had a mammogram within the past 2 years? Yes - no Care Gap present      5. For patients aged 21-65: Has the patient had a pap smear?  No

## 2022-11-18 NOTE — PROGRESS NOTES
Stefan Fishman is a 43 y.o. female who was seen by synchronous (real-time) audio-video technology on 2022 for Fatigue (And trouble with focus. ) and Medication Refill (Needs inhaler refilled. )        Progress Note         PROGRESS NOTE  Name: Stefan Fishman   : 1980       ASSESSMENT/ PLAN:     Diagnoses and all orders for this visit:    Difficulty concentrating: Could be ADHD vs depression (lorri given fatigue and amotivation). -     REFERRAL TO NEUROPSYCHOLOGY    Mild intermittent asthma without complication  -     albuterol (PROVENTIL HFA, VENTOLIN HFA, PROAIR HFA) 90 mcg/actuation inhaler; Take 1 Puff by inhalation every four (4) hours as needed for Wheezing., Normal, Disp-1 Each, R-0    Follow up as planned. I have reviewed the patient's medications and risks/side effects/benefits were discussed. Diagnosis(-es) explained to patient and questions answered. Literature provided where appropriate. SUBJECTIVE  Ms. Stefan Fishman presents today acutely for     Chief Complaint   Patient presents with    Fatigue     And trouble with focus. Medication Refill     Needs inhaler refilled. She feels that \"I just can't focus on a task. \" \"I can't do things like I used to. \"     \"I am always tired. \"     Unmotivated. She was on ritalin as a teenager for ADHD. OBJECTIVE  There were no vitals taken for this visit. Gen: Pleasant 43 y.o.  female in NAD. Objective:   No flowsheet data found.      [INSTRUCTIONS:  \"[x]\" Indicates a positive item  \"[]\" Indicates a negative item  -- DELETE ALL ITEMS NOT EXAMINED]    Constitutional: [x] Appears well-developed and well-nourished [x] No apparent distress      [] Abnormal -     Mental status: [x] Alert and awake  [x] Oriented to person/place/time [x] Able to follow commands    [] Abnormal -     Eyes:   EOM    [x]  Normal    [] Abnormal -   Sclera  [x]  Normal    [] Abnormal -          Discharge [x]  None visible   [] Abnormal -     HENT: [x] Normocephalic, atraumatic  [] Abnormal -   [x] Mouth/Throat: Mucous membranes are moist    External Ears [x] Normal  [] Abnormal -    Neck: [x] No visualized mass [] Abnormal -     Pulmonary/Chest: [x] Respiratory effort normal   [x] No visualized signs of difficulty breathing or respiratory distress        [] Abnormal -      Musculoskeletal:   [x] Normal gait with no signs of ataxia         [x] Normal range of motion of neck        [] Abnormal -     Neurological:        [x] No Facial Asymmetry (Cranial nerve 7 motor function) (limited exam due to video visit)          [x] No gaze palsy        [] Abnormal -          Skin:        [x] No significant exanthematous lesions or discoloration noted on facial skin         [] Abnormal -            Psychiatric:       [x] Normal Affect [] Abnormal -       Other pertinent observable physical exam findings:-        We discussed the expected course, resolution and complications of the diagnosis(es) in detail. Medication risks, benefits, costs, interactions, and alternatives were discussed as indicated. I advised her to contact the office if her condition worsens, changes or fails to improve as anticipated. She expressed understanding with the diagnosis(es) and plan. Maurice Joseph, who was evaluated through a patient-initiated, synchronous (real-time) audio-video encounter, and/or her healthcare decision maker, is aware that it is a billable service, with coverage as determined by her insurance carrier. She provided verbal consent to proceed: YES, and patient identification was verified. It was conducted pursuant to the emergency declaration under the [address] [place] [address] and the [employer] Act. A caregiver was present when appropriate. Ability to conduct physical exam was limited. I was not in office.  The patient was at home or otherwise outside the office.       Michael Howe MD

## 2022-12-08 ENCOUNTER — TELEPHONE (OUTPATIENT)
Dept: NEUROLOGY | Age: 42
End: 2022-12-08

## 2023-01-13 DIAGNOSIS — J45.20 MILD INTERMITTENT ASTHMA WITHOUT COMPLICATION: ICD-10-CM

## 2023-01-13 RX ORDER — ALBUTEROL SULFATE 90 UG/1
AEROSOL, METERED RESPIRATORY (INHALATION)
Qty: 8.5 G | Refills: 5 | Status: SHIPPED | OUTPATIENT
Start: 2023-01-13

## 2023-05-11 RX ORDER — METOPROLOL TARTRATE 100 MG/1
100 TABLET ORAL 2 TIMES DAILY
Qty: 180 TABLET | Refills: 0 | Status: SHIPPED | OUTPATIENT
Start: 2023-05-11

## 2023-05-11 NOTE — TELEPHONE ENCOUNTER
Requested Prescriptions     Signed Prescriptions Disp Refills    metoprolol (LOPRESSOR) 100 MG tablet 180 tablet 0     Sig: Take 1 tablet by mouth 2 times daily Schedule follow up with Dr. Bear Benito or send future refill request to PCP.      Authorizing Provider: Luca Higgins     Ordering User: Rosmery Guerrero     Verbal order per Dr. Bear Benito.

## 2023-05-15 ENCOUNTER — TELEPHONE (OUTPATIENT)
Age: 43
End: 2023-05-15

## 2023-05-15 NOTE — TELEPHONE ENCOUNTER
Pt would like to know how long will her medication supply last before she would have to be seen again?      Pt # [phone]

## 2023-05-18 NOTE — TELEPHONE ENCOUNTER
Future Appointments   Date Time Provider Selina Mancillai   5/22/2023  1:40 PM MD REGGIE Mccormack AMB     Identifiers x 2. Informed of the above. Verbalized understanding.

## 2023-05-18 NOTE — TELEPHONE ENCOUNTER
Spoke with patient. 2 patient identifiers used. Patient states that  she needs an appointment be she loses her insurance. Cannot wait until June 28th.

## 2023-05-22 ENCOUNTER — OFFICE VISIT (OUTPATIENT)
Age: 43
End: 2023-05-22
Payer: COMMERCIAL

## 2023-05-22 VITALS
BODY MASS INDEX: 40.69 KG/M2 | HEIGHT: 66 IN | WEIGHT: 253.2 LBS | OXYGEN SATURATION: 94 % | HEART RATE: 63 BPM | DIASTOLIC BLOOD PRESSURE: 86 MMHG | SYSTOLIC BLOOD PRESSURE: 128 MMHG

## 2023-05-22 DIAGNOSIS — E66.01 SEVERE OBESITY (HCC): ICD-10-CM

## 2023-05-22 DIAGNOSIS — I47.1 PAROXYSMAL SVT (SUPRAVENTRICULAR TACHYCARDIA) (HCC): ICD-10-CM

## 2023-05-22 DIAGNOSIS — Q25.1 AORTA COARCTATION: Primary | ICD-10-CM

## 2023-05-22 PROCEDURE — 99214 OFFICE O/P EST MOD 30 MIN: CPT | Performed by: INTERNAL MEDICINE

## 2023-05-22 PROCEDURE — 3074F SYST BP LT 130 MM HG: CPT | Performed by: INTERNAL MEDICINE

## 2023-05-22 PROCEDURE — 3078F DIAST BP <80 MM HG: CPT | Performed by: INTERNAL MEDICINE

## 2023-05-22 PROCEDURE — 93000 ELECTROCARDIOGRAM COMPLETE: CPT | Performed by: INTERNAL MEDICINE

## 2023-05-22 ASSESSMENT — PATIENT HEALTH QUESTIONNAIRE - PHQ9
SUM OF ALL RESPONSES TO PHQ9 QUESTIONS 1 & 2: 0
SUM OF ALL RESPONSES TO PHQ QUESTIONS 1-9: 0
1. LITTLE INTEREST OR PLEASURE IN DOING THINGS: 0
SUM OF ALL RESPONSES TO PHQ QUESTIONS 1-9: 0
2. FEELING DOWN, DEPRESSED OR HOPELESS: 0

## 2023-05-22 NOTE — PROGRESS NOTES
[facility] [address]: Miya Simons  ([phone]) [phone]    HPI:  Ms. Lizet Solano is a 44 yo F with h/o pre-hypertension, h/o ADS s/p patch at [facility] at age 11, cardiac MRI 10/2016 noted previous patch with no residual shunting, h/o AVNRT and atypical atrial flutter s/p ablation 12/2020 with Dr. Rebekah Kocher, asthma and HUNTER. Cardiac MRI 10/2016 also noted persistent left SVC between the left atrial appendage and the left superior pulmonary vein to join the coronary sinus; mild coarctation of the aorta between the ascending and aortic arch in the proximal descending thoracic aorta. The coarctation measured 14 mm in diameter; distal to the coarctation post dilation at 28 mm and proximally 29 mm. The ascending aorta was dilated at 43 x 38 mm. She was seen by Saul Blackwell. Since her last visit overall she has been doing okay. Denies any chest pain. Her breathing has been stable. She has lost some weight, she says not necessarily trying to. Has not had significant palpitations. Her echo last year demonstrated preserved LV function with just mild aortic regurgitation and her aorta was slightly dilated at 4 cm. She is compensated on exam with clear lungs and just trace lower extremity edema. Her EKG is sinus bradycardia, heart rate of 56. Assessment and Plan:  1. Aortic coarctation. Noted by MRI in 2016. Dr. Rebekah Kocher did recommend that she see Dr. Paramjit Blackwell on a regular basis for surveillance of this and her persistent SVC. 2. ASD status post repair. Echo last year was normal.  Consider repeat echo once every two to three years. 3. Persistent SVC. 4. Tobacco use. Encouraged cessation. 5. Essential hypertension. Blood pressure is controlled. 6. Paroxysmal SVT status post AVNRT ablation for flutter. Followed by Dr. Rebekah Kocher. She  has a past medical history of Allergy, Anxiety, Asthma, and Congenital heart disease. She has no past medical history of Myocardial infarction [facility]) or Pulmonary embolism ([facility] [address]     All other systems negative

## 2023-06-16 ENCOUNTER — TELEPHONE (OUTPATIENT)
Age: 43
End: 2023-06-16

## 2023-06-19 RX ORDER — ALBUTEROL SULFATE 2.5 MG/3ML
2.5 SOLUTION RESPIRATORY (INHALATION) 4 TIMES DAILY PRN
Qty: 120 EACH | Refills: 3 | Status: SHIPPED | OUTPATIENT
Start: 2023-06-19

## 2023-06-19 RX ORDER — ALBUTEROL SULFATE 90 UG/1
AEROSOL, METERED RESPIRATORY (INHALATION)
Qty: 8.5 G | Refills: 11 | Status: SHIPPED | OUTPATIENT
Start: 2023-06-19

## 2023-06-19 NOTE — TELEPHONE ENCOUNTER
PCP: Ashley Rajan MD    Last appt:   6/16/2023    Future Appointments   Date Time Provider Selina Michael   6/23/2023  9:00 AM Hood Rueda MD [facility] BS AMB   5/22/2024  2:20 PM MD REGGIE Jay BS AMB       Requested Prescriptions     Pending Prescriptions Disp Refills    albuterol sulfate HFA (PROVENTIL;VENTOLIN;PROAIR) 108 (90 Base) MCG/ACT inhaler 8.5 g 11     Sig: INHALE ONE PUFF BY MOUTH EVERY 4 HOURS AS NEEDED FOR WHEEZING

## 2023-06-19 NOTE — TELEPHONE ENCOUNTER
Patient states her albuterol nebulizer solution 5mg/1mL is no longer available and pharmacy did not give her an alternate suggestion

## 2023-06-19 NOTE — TELEPHONE ENCOUNTER
----- Message from Obi Jaimes sent at 6/19/2023 12:10 PM EDT -----  Subject: Refill Request    QUESTIONS  Name of Medication? albuterol sulfate HFA (PROVENTIL;VENTOLIN;PROAIR) 108   (90 Base) MCG/ACT inhaler  Patient-reported dosage and instructions? prn  How many days do you have left? 0  Preferred Pharmacy? [facility] [id number]  Pharmacy phone number (if available)? [phone]  Additional Information for Provider? pt needs nebulizer albuterol sent in,   pharmacy said the 5 mg was discontinued, please advise.  ---------------------------------------------------------------------------  --------------  CALL BACK INFO  What is the best way for the office to contact you? OK to leave message on   voicemail  Preferred Call Back Phone Number? [phone]  ---------------------------------------------------------------------------  --------------  SCRIPT ANSWERS  Relationship to Patient?  Self

## 2023-06-23 ENCOUNTER — OFFICE VISIT (OUTPATIENT)
Age: 43
End: 2023-06-23
Payer: COMMERCIAL

## 2023-06-23 ENCOUNTER — HOSPITAL ENCOUNTER (OUTPATIENT)
Facility: HOSPITAL | Age: 43
Discharge: HOME | End: 2023-06-23
Payer: COMMERCIAL

## 2023-06-23 VITALS
HEIGHT: 65 IN | WEIGHT: 253 LBS | OXYGEN SATURATION: 93 % | BODY MASS INDEX: 42.15 KG/M2 | DIASTOLIC BLOOD PRESSURE: 90 MMHG | RESPIRATION RATE: 18 BRPM | HEART RATE: 63 BPM | SYSTOLIC BLOOD PRESSURE: 135 MMHG | TEMPERATURE: 97.4 F

## 2023-06-23 DIAGNOSIS — R05.9 COUGH, UNSPECIFIED TYPE: ICD-10-CM

## 2023-06-23 DIAGNOSIS — J45.20 MILD INTERMITTENT ASTHMA, UNCOMPLICATED: ICD-10-CM

## 2023-06-23 DIAGNOSIS — G43.009 MIGRAINE WITHOUT AURA AND WITHOUT STATUS MIGRAINOSUS, NOT INTRACTABLE: ICD-10-CM

## 2023-06-23 DIAGNOSIS — R73.9 BORDERLINE HYPERGLYCEMIA: ICD-10-CM

## 2023-06-23 DIAGNOSIS — R63.4 WEIGHT LOSS: ICD-10-CM

## 2023-06-23 DIAGNOSIS — I10 PRIMARY HYPERTENSION: Primary | ICD-10-CM

## 2023-06-23 DIAGNOSIS — Z80.0 FAMILY HISTORY OF COLON CANCER IN FATHER: ICD-10-CM

## 2023-06-23 DIAGNOSIS — R41.840 ATTENTION AND CONCENTRATION DEFICIT: ICD-10-CM

## 2023-06-23 DIAGNOSIS — E66.01 SEVERE OBESITY (HCC): ICD-10-CM

## 2023-06-23 PROCEDURE — 3080F DIAST BP >= 90 MM HG: CPT | Performed by: INTERNAL MEDICINE

## 2023-06-23 PROCEDURE — 71046 X-RAY EXAM CHEST 2 VIEWS: CPT

## 2023-06-23 PROCEDURE — 3074F SYST BP LT 130 MM HG: CPT | Performed by: INTERNAL MEDICINE

## 2023-06-23 PROCEDURE — 99214 OFFICE O/P EST MOD 30 MIN: CPT | Performed by: INTERNAL MEDICINE

## 2023-06-23 RX ORDER — SUMATRIPTAN 100 MG/1
100 TABLET, FILM COATED ORAL DAILY PRN
Qty: 9 TABLET | Refills: 3 | Status: SHIPPED | OUTPATIENT
Start: 2023-06-23

## 2023-06-23 ASSESSMENT — PATIENT HEALTH QUESTIONNAIRE - PHQ9
SUM OF ALL RESPONSES TO PHQ QUESTIONS 1-9: 0
1. LITTLE INTEREST OR PLEASURE IN DOING THINGS: 0
SUM OF ALL RESPONSES TO PHQ9 QUESTIONS 1 & 2: 0
2. FEELING DOWN, DEPRESSED OR HOPELESS: 0
SUM OF ALL RESPONSES TO PHQ QUESTIONS 1-9: 0

## 2023-06-24 LAB
ALBUMIN SERPL-MCNC: 3.1 G/DL (ref 3.5–5)
ALBUMIN/GLOB SERPL: 0.8 (ref 1.1–2.2)
ALP SERPL-CCNC: 242 U/L (ref 45–117)
ALT SERPL-CCNC: 56 U/L (ref 12–78)
ANION GAP SERPL CALC-SCNC: 4 MMOL/L (ref 5–15)
AST SERPL-CCNC: 78 U/L (ref 15–37)
BASOPHILS # BLD: 0.1 K/UL (ref 0–0.1)
BASOPHILS NFR BLD: 1 % (ref 0–1)
BILIRUB SERPL-MCNC: 0.6 MG/DL (ref 0.2–1)
BUN SERPL-MCNC: 15 MG/DL (ref 6–20)
BUN/CREAT SERPL: 16 (ref 12–20)
CALCIUM SERPL-MCNC: 9.5 MG/DL (ref 8.5–10.1)
CHLORIDE SERPL-SCNC: 102 MMOL/L (ref 97–108)
CHOLEST SERPL-MCNC: 231 MG/DL
CO2 SERPL-SCNC: 36 MMOL/L (ref 21–32)
CREAT SERPL-MCNC: 0.93 MG/DL (ref 0.55–1.02)
DIFFERENTIAL METHOD BLD: ABNORMAL
EOSINOPHIL # BLD: 0.3 K/UL (ref 0–0.4)
EOSINOPHIL NFR BLD: 4 % (ref 0–7)
ERYTHROCYTE [DISTWIDTH] IN BLOOD BY AUTOMATED COUNT: 14.4 % (ref 11.5–14.5)
EST. AVERAGE GLUCOSE BLD GHB EST-MCNC: 117 MG/DL
GLOBULIN SER CALC-MCNC: 3.8 G/DL (ref 2–4)
GLUCOSE SERPL-MCNC: 107 MG/DL (ref 65–100)
HBA1C MFR BLD: 5.7 % (ref 4–5.6)
HCT VFR BLD AUTO: 52.8 % (ref 35–47)
HDLC SERPL-MCNC: 59 MG/DL
HDLC SERPL: 3.9 (ref 0–5)
HGB BLD-MCNC: 16.2 G/DL (ref 11.5–16)
IMM GRANULOCYTES # BLD AUTO: 0 K/UL (ref 0–0.04)
IMM GRANULOCYTES NFR BLD AUTO: 0 % (ref 0–0.5)
LDLC SERPL CALC-MCNC: 157.2 MG/DL (ref 0–100)
LYMPHOCYTES # BLD: 1.5 K/UL (ref 0.8–3.5)
LYMPHOCYTES NFR BLD: 19 % (ref 12–49)
MCH RBC QN AUTO: 29.8 PG (ref 26–34)
MCHC RBC AUTO-ENTMCNC: 30.7 G/DL (ref 30–36.5)
MCV RBC AUTO: 97.2 FL (ref 80–99)
MONOCYTES # BLD: 0.9 K/UL (ref 0–1)
MONOCYTES NFR BLD: 10 % (ref 5–13)
NEUTS SEG # BLD: 5.4 K/UL (ref 1.8–8)
NEUTS SEG NFR BLD: 66 % (ref 32–75)
NRBC # BLD: 0 K/UL (ref 0–0.01)
NRBC BLD-RTO: 0 PER 100 WBC
PLATELET # BLD AUTO: 329 K/UL (ref 150–400)
PMV BLD AUTO: 11.2 FL (ref 8.9–12.9)
POTASSIUM SERPL-SCNC: 4.1 MMOL/L (ref 3.5–5.1)
PROT SERPL-MCNC: 6.9 G/DL (ref 6.4–8.2)
RBC # BLD AUTO: 5.43 M/UL (ref 3.8–5.2)
SODIUM SERPL-SCNC: 142 MMOL/L (ref 136–145)
TRIGL SERPL-MCNC: 74 MG/DL
TSH SERPL DL<=0.05 MIU/L-ACNC: 1.31 UIU/ML (ref 0.36–3.74)
VLDLC SERPL CALC-MCNC: 14.8 MG/DL
WBC # BLD AUTO: 8.2 K/UL (ref 3.6–11)

## 2023-07-12 ENCOUNTER — TELEPHONE (OUTPATIENT)
Age: 43
End: 2023-07-12

## 2023-07-13 RX ORDER — SUMATRIPTAN 50 MG/1
50 TABLET, FILM COATED ORAL DAILY PRN
Qty: 9 TABLET | Refills: 3 | Status: SHIPPED | OUTPATIENT
Start: 2023-07-13

## 2023-07-13 NOTE — TELEPHONE ENCOUNTER
OK, on labs gluc was slightly high at 107, and A1C was slightly high at 5.7. Cholesterol was high. Work on diet and exercise. We again see abnormal liver enzymes. Regarding the migraine medicine, she could try cutting dose to 50. New Rx written.

## 2023-07-13 NOTE — TELEPHONE ENCOUNTER
I advised the patient per , \"gluc was slightly high at 107, and A1C was slightly high at 5.7. Cholesterol was high. Work on diet and exercise. We again see abnormal liver enzymes. Regarding the migraine medicine, she could try cutting dose to 50. New Rx written. \"  Pt verbalized understanding of information discussed w/ no further questions at this time.

## 2023-07-13 NOTE — TELEPHONE ENCOUNTER
The patient wanted her lab results and she would like to know what to do about her headaches. I asked her if she was taking her meds. , she stated they are to strong for her to use during the day. I asked her when her last eye exam was, she said a very, very long time. I told her I would ask  about her concerns. Patient notified of results and response from Vika Shelton MD    States understanding of labs and xray results.

## 2023-07-31 ENCOUNTER — TELEPHONE (OUTPATIENT)
Age: 43
End: 2023-07-31

## 2023-07-31 NOTE — TELEPHONE ENCOUNTER
----- Message from Braydenfarhad Naima sent at 7/28/2023  4:37 PM EDT -----  Subject: Message to Provider    QUESTIONS  Information for Provider? call pt back, she is asking if she needs an appt   or if pcp can fill out FMLA paperwork for her Stefan  her mom passed away,   call pt back as soon as possible she needs form filled out beginning of   next week  ---------------------------------------------------------------------------  --------------  [address] [place]  [phone]; OK to leave message on voicemail  ---------------------------------------------------------------------------  --------------  SCRIPT ANSWERS  Relationship to Patient?  Self

## 2023-08-04 ENCOUNTER — OFFICE VISIT (OUTPATIENT)
Age: 43
End: 2023-08-04
Payer: COMMERCIAL

## 2023-08-04 VITALS
SYSTOLIC BLOOD PRESSURE: 126 MMHG | HEART RATE: 69 BPM | OXYGEN SATURATION: 94 % | DIASTOLIC BLOOD PRESSURE: 89 MMHG | BODY MASS INDEX: 41.48 KG/M2 | TEMPERATURE: 98.6 F | RESPIRATION RATE: 18 BRPM | WEIGHT: 249 LBS | HEIGHT: 65 IN

## 2023-08-04 DIAGNOSIS — F41.8 ANXIETY WITH DEPRESSION: Primary | ICD-10-CM

## 2023-08-04 PROCEDURE — 3074F SYST BP LT 130 MM HG: CPT | Performed by: INTERNAL MEDICINE

## 2023-08-04 PROCEDURE — 99213 OFFICE O/P EST LOW 20 MIN: CPT | Performed by: INTERNAL MEDICINE

## 2023-08-04 PROCEDURE — 3079F DIAST BP 80-89 MM HG: CPT | Performed by: INTERNAL MEDICINE

## 2023-08-04 RX ORDER — SERTRALINE HYDROCHLORIDE 25 MG/1
25 TABLET, FILM COATED ORAL DAILY
Qty: 90 TABLET | Refills: 1 | Status: SHIPPED | OUTPATIENT
Start: 2023-08-04

## 2023-08-04 SDOH — ECONOMIC STABILITY: INCOME INSECURITY: HOW HARD IS IT FOR YOU TO PAY FOR THE VERY BASICS LIKE FOOD, HOUSING, MEDICAL CARE, AND HEATING?: NOT HARD AT ALL

## 2023-08-04 SDOH — ECONOMIC STABILITY: HOUSING INSECURITY
IN THE LAST 12 MONTHS, WAS THERE A TIME WHEN YOU DID NOT HAVE A STEADY PLACE TO SLEEP OR SLEPT IN A SHELTER (INCLUDING NOW)?: NO

## 2023-08-04 SDOH — ECONOMIC STABILITY: FOOD INSECURITY: WITHIN THE PAST 12 MONTHS, THE FOOD YOU BOUGHT JUST DIDN'T LAST AND YOU DIDN'T HAVE MONEY TO GET MORE.: NEVER TRUE

## 2023-08-04 SDOH — ECONOMIC STABILITY: FOOD INSECURITY: WITHIN THE PAST 12 MONTHS, YOU WORRIED THAT YOUR FOOD WOULD RUN OUT BEFORE YOU GOT MONEY TO BUY MORE.: NEVER TRUE

## 2023-08-04 ASSESSMENT — ANXIETY QUESTIONNAIRES
6. BECOMING EASILY ANNOYED OR IRRITABLE: 3
1. FEELING NERVOUS, ANXIOUS, OR ON EDGE: 3
IF YOU CHECKED OFF ANY PROBLEMS ON THIS QUESTIONNAIRE, HOW DIFFICULT HAVE THESE PROBLEMS MADE IT FOR YOU TO DO YOUR WORK, TAKE CARE OF THINGS AT HOME, OR GET ALONG WITH OTHER PEOPLE: VERY DIFFICULT
7. FEELING AFRAID AS IF SOMETHING AWFUL MIGHT HAPPEN: 3
3. WORRYING TOO MUCH ABOUT DIFFERENT THINGS: 3
5. BEING SO RESTLESS THAT IT IS HARD TO SIT STILL: 3
2. NOT BEING ABLE TO STOP OR CONTROL WORRYING: 2
GAD7 TOTAL SCORE: 20
4. TROUBLE RELAXING: 3

## 2023-08-04 ASSESSMENT — PATIENT HEALTH QUESTIONNAIRE - PHQ9
SUM OF ALL RESPONSES TO PHQ QUESTIONS 1-9: 19
10. IF YOU CHECKED OFF ANY PROBLEMS, HOW DIFFICULT HAVE THESE PROBLEMS MADE IT FOR YOU TO DO YOUR WORK, TAKE CARE OF THINGS AT HOME, OR GET ALONG WITH OTHER PEOPLE: 2
5. POOR APPETITE OR OVEREATING: 3
SUM OF ALL RESPONSES TO PHQ QUESTIONS 1-9: 19
3. TROUBLE FALLING OR STAYING ASLEEP: 1
2. FEELING DOWN, DEPRESSED OR HOPELESS: 3
1. LITTLE INTEREST OR PLEASURE IN DOING THINGS: 3
SUM OF ALL RESPONSES TO PHQ QUESTIONS 1-9: 19
4. FEELING TIRED OR HAVING LITTLE ENERGY: 3
9. THOUGHTS THAT YOU WOULD BE BETTER OFF DEAD, OR OF HURTING YOURSELF: 0
7. TROUBLE CONCENTRATING ON THINGS, SUCH AS READING THE NEWSPAPER OR WATCHING TELEVISION: 3
SUM OF ALL RESPONSES TO PHQ9 QUESTIONS 1 & 2: 6
SUM OF ALL RESPONSES TO PHQ QUESTIONS 1-9: 19
6. FEELING BAD ABOUT YOURSELF - OR THAT YOU ARE A FAILURE OR HAVE LET YOURSELF OR YOUR FAMILY DOWN: 3
8. MOVING OR SPEAKING SO SLOWLY THAT OTHER PEOPLE COULD HAVE NOTICED. OR THE OPPOSITE, BEING SO FIGETY OR RESTLESS THAT YOU HAVE BEEN MOVING AROUND A LOT MORE THAN USUAL: 0

## 2023-08-04 NOTE — PROGRESS NOTES
1. \"Have you been to the ER, urgent care clinic since your last visit? Hospitalized since your last visit? \" No    2. \"Have you seen or consulted any other health care providers outside of the [address] [place] [address] since your last visit? \" No     3. For patients aged 43-73: Has the patient had a colonoscopy / FIT/ Cologuard? Yes - no Care Gap present      If the patient is female:    4. For patients aged 43-66: Has the patient had a mammogram within the past 2 years? Yes - no Care Gap present      5. For patients aged 21-65: Has the patient had a pap smear?  No

## 2023-08-08 RX ORDER — METOPROLOL TARTRATE 100 MG/1
100 TABLET ORAL 2 TIMES DAILY
Qty: 180 TABLET | Refills: 1 | Status: SHIPPED | OUTPATIENT
Start: 2023-08-08

## 2023-08-08 NOTE — TELEPHONE ENCOUNTER
Requested Prescriptions     Signed Prescriptions Disp Refills    metoprolol (LOPRESSOR) 100 MG tablet 180 tablet 1     Sig: Take 1 tablet by mouth 2 times daily     Authorizing Provider: Cece Denny     Ordering User: Janet JOHANSEN per NP     Future Appointments   Date Time Provider [address]   5/22/2024  2:20 PM MD REGGIE Johnson AMB

## 2023-08-14 ENCOUNTER — TELEPHONE (OUTPATIENT)
Age: 43
End: 2023-08-14

## 2023-08-14 NOTE — TELEPHONE ENCOUNTER
I explained to the patient, our office received additional paperwork from [address] [place] [address]. She will need to come by the office to complete her portion and then I can give  the form for completion. Patient verbalized understanding of information discussed w/ no further questions at this time.

## 2023-08-24 ENCOUNTER — TELEPHONE (OUTPATIENT)
Age: 43
End: 2023-08-24

## 2023-08-24 NOTE — TELEPHONE ENCOUNTER
I advised the patient, the paperwork is ready for signature. Form at  for . Patient verbalized understanding of information discussed w/ no further questions at this time.

## 2023-10-13 RX ORDER — DILTIAZEM HYDROCHLORIDE 120 MG/1
120 CAPSULE, COATED, EXTENDED RELEASE ORAL DAILY
Qty: 90 CAPSULE | Refills: 0 | Status: SHIPPED | OUTPATIENT
Start: 2023-10-13

## 2023-10-13 NOTE — TELEPHONE ENCOUNTER
Requested Prescriptions     Signed Prescriptions Disp Refills    dilTIAZem (CARDIZEM CD) 120 MG extended release capsule 90 capsule 0     Sig: TAKE ONE CAPSULE BY MOUTH DAILY     Authorizing Provider: Ace Santana     Ordering User:  Cameron rodriguez MD    Future Appointments   Date Time Provider [facility] [address]   5/22/2024  2:20 PM MD REGGIE Lee AMB

## 2023-10-31 NOTE — PROGRESS NOTES
SUBJECTIVE:   Ms. Kavitha Dimas is a 39 y.o. female who is here for follow up of routine medical issues. Chief Complaint   Patient presents with    New Patient     talk about medications       She had R knee arthroscopy for tibial plateau, from Dr. Leyda Bautista. She is getting PT. Depression: Stress from Shila and her physical limitations have contributed. She has had suicidal ideation at times, but \"I would never act on it. \"  She was on medication in her early 19's but doesn't remember what it was. Asthma has been under control. She has SVT, and had ablation last year from Dr. Rina Farfan. She follows also with Dr. Gagan Shirley at [facility]. \"Before the ablation, they did a CT scan, and saw something that could be cancer or cirrhosis, or both. \" She had numerous lab tests and a biopsy, which came back normal. She declined a R heart cath. She has endometriosis. Her gynecologist is at [facility]. At this time, she is otherwise doing well and has brought no other complaints to my attention today. For a list of the medical issues addressed today, see the assessment and plan below. PMH:   Past Medical History:   Diagnosis Date    Allergy     Anxiety     Asthma     Congenital heart disease        Past Surgical History:   Procedure Laterality Date    HX DILATION AND CURETTAGE      endometriosis and precervical ca    HX HEENT      tonsillectomy,DNS sx    HX ORTHOPAEDIC      right tibia fx,no sx needed    IR BX LIVER PERCUTANEOUS  2/4/2021    IR BX TRANSCATHETER  2/4/2021    IL COMPRE ELECTROPHYSIOLOGIC ARRHYTHMIA INDUCTION N/A 12/9/2020    EP STUDY COMPLETE performed by Tomy Ritchie MD at [address] CATH LAB     [address] ADD ON N/A 12/9/2020    Ablation Svt/Vt Add On performed by Tomy Ritchie MD at [address] CATH LAB    IL KNEE SCOPE, ALLOGRAFT IMPANT         All: She is allergic to codeine, hydrocodone, pcn [penicillins], and zithromax [azithromycin]. Current Outpatient Medications   Medication Sig    gabapentin (NEURONTIN) 300 mg capsule Take 300 mg by mouth daily.  cloNIDine HCL (CATAPRES) 0.1 mg tablet Take  by mouth two (2) times a day.  furosemide (LASIX) 40 mg tablet Take  by mouth daily.  senna (Senexon) 8.6 mg tablet Take 1 Tablet by mouth daily.  traMADoL (ULTRAM) 50 mg tablet Take 50 mg by mouth every six (6) hours as needed for Pain.  ibuprofen (MOTRIN) 800 mg tablet Take  by mouth.  potassium chloride (Klor-Con) 20 mEq pack Take 20 mEq by mouth two (2) times daily (with meals).  aspirin delayed-release 81 mg tablet Take  by mouth daily.  Cartia  mg capsule TAKE ONE CAPSULE BY MOUTH DAILY    metoprolol tartrate (LOPRESSOR) 100 mg IR tablet TAKE ONE TABLET BY MOUTH TWICE A DAY    ascorbic acid, vitamin C, (Vitamin C) 500 mg tablet Take  by mouth.  vitamin B complex (B COMPLEX-100 PO) Take  by mouth.  co-enzyme Q-10 (Co Q-10) 100 mg capsule Take 100 mg by mouth daily.  albuterol (PROVENTIL HFA, VENTOLIN HFA, PROAIR HFA) 90 mcg/actuation inhaler Take 1 Puff by inhalation every four (4) hours as needed for Wheezing.  MULTIVITS,CA,MINERALS/IRON/FA (ONE-A-DAY WOMENS FORMULA PO) Take  by mouth.  Omega-3-DHA-EPA-Fish Oil 1,000 (120-180) mg cap Take 1,000 mg by mouth daily.  cyanocobalamin (Vitamin B-12) 100 mcg tablet Take 100 mcg by mouth daily.  collagen, bovine, 100 % pwpk by Apply Externally route. (Patient not taking: Reported on 8/30/2021)     No current facility-administered medications for this visit. FH: Her family history includes Colon Cancer in her father; Hypertension in her mother. SH: She is on [employer], works at [employer]. She reports that she has been smoking cigarettes. She has a 0.50 pack-year smoking history. She has never used smokeless tobacco. She reports previous alcohol use. She reports current drug use. Drug: Marijuana. ROS: See above; Complete ROS otherwise negative. OBJECTIVE:   Vitals:   Visit Vitals  /83 (BP 1 Location: Left arm, BP Patient Position: Sitting)   Pulse (!) 51   Temp 98.8 °F (37.1 °C) (Temporal)   Resp 16   Ht 5' 5\" (1.651 m)   Wt 262 lb 6.4 oz (119 kg)   SpO2 98%   BMI 43.67 kg/m²      Gen: Pleasant, obese 39 y.o. W female in [facility]. She is in a transfer chair, and R knee is in brace. HEENT: PERRLA. EOMI. OP moist and pink. Neck: Supple. No LAD. HEART: RRR, No M/G/R.    LUNGS: CTAB No W/R. ABDOMEN: S, NT, ND, BS+. EXTREMITIES: Warm. No C/C/E.  MUSCULOSKELETAL: Normal ROM, muscle strength 5/5 all groups. NEURO: Alert and oriented x 3. Cranial nerves grossly intact. No focal sensory or motor deficits noted. SKIN: Warm. Dry. No rashes or other lesions noted. Lab Results   Component Value Date/Time    Sodium 138 01/06/2021 01:48 PM    Potassium 4.3 01/06/2021 01:48 PM    Chloride 104 01/06/2021 01:48 PM    CO2 33 (H) 01/06/2021 01:48 PM    Anion gap 1 (L) 01/06/2021 01:48 PM    Glucose 93 01/06/2021 01:48 PM    BUN 13 01/06/2021 01:48 PM    Creatinine 0.81 01/06/2021 01:48 PM    BUN/Creatinine ratio 16 01/06/2021 01:48 PM    GFR est AA >60 01/06/2021 01:48 PM    GFR est non-AA >60 01/06/2021 01:48 PM    Calcium 9.2 01/06/2021 01:48 PM    Bilirubin, total 0.8 01/06/2021 01:48 PM    ALT (SGPT) 51 01/06/2021 01:48 PM    Alk.  phosphatase 183 (H) 01/06/2021 01:48 PM    Protein, total 7.0 01/06/2021 01:48 PM    Albumin 3.5 01/06/2021 01:48 PM    Globulin 3.5 01/06/2021 01:48 PM    A-G Ratio 1.0 (L) 01/06/2021 01:48 PM       Lab Results   Component Value Date/Time    Cholesterol, total 193 04/03/2017 10:16 AM    HDL Cholesterol 61 04/03/2017 10:16 AM    LDL, calculated 112 (H) 04/03/2017 10:16 AM    Triglyceride 102 04/03/2017 10:16 AM         Lab Results   Component Value Date/Time    WBC 8.9 01/06/2021 01:48 PM    HGB 15.4 01/06/2021 01:48 PM    HCT 48.9 (H) 01/06/2021 01:48 PM    PLATELET 566 49/59/5472 01:48 PM    MCV 95.3 01/06/2021 01:48 PM ASSESSMENT/ PLAN: Diagnoses and all orders for this visit:    1. Hypertension  -     LIPID PANEL; Future  -     METABOLIC PANEL, COMPREHENSIVE; Future  -     CBC WITH AUTOMATED DIFF; Future    2. Paroxysmal SVT (supraventricular tachycardia) (HCC) and history of ASD s/p repair: F/u with cardiologist     3. Family history of colon cancer  -     REFERRAL TO GASTROENTEROLOGY    4. Obesity: Discussed diet and exercise. 5. Depression with anxiety: Discussed pros and cons of SSRI as a first line tx, and she wants to think about it. 6. Hx of abnormal LFT and liver imaging, possibly passive congestion: We'll check LFT. I have reviewed the patient's medications and risks/side effects/benefits were discussed. Diagnosis(-es) explained to patient and questions answered. Literature provided where appropriate.      RTC about 6 months, HTN Selina (PGY2)

## 2023-11-29 ENCOUNTER — TELEPHONE (OUTPATIENT)
Age: 43
End: 2023-11-29

## 2023-11-29 NOTE — TELEPHONE ENCOUNTER
Pt has random vomiting and needs to be seen. Pt states at least once a day. Pt does not want VV. Pt can only be seen on Mondays or after 3:30 pm any other day.      Please call to schedule as there are no appts soon

## 2023-12-15 ENCOUNTER — TELEMEDICINE (OUTPATIENT)
Age: 43
End: 2023-12-15
Payer: COMMERCIAL

## 2023-12-15 DIAGNOSIS — R11.10 VOMITING, UNSPECIFIED VOMITING TYPE, UNSPECIFIED WHETHER NAUSEA PRESENT: Primary | ICD-10-CM

## 2023-12-15 DIAGNOSIS — R10.13 EPIGASTRIC PAIN: ICD-10-CM

## 2023-12-15 PROCEDURE — 99213 OFFICE O/P EST LOW 20 MIN: CPT | Performed by: INTERNAL MEDICINE

## 2023-12-15 RX ORDER — PANTOPRAZOLE SODIUM 40 MG/1
40 TABLET, DELAYED RELEASE ORAL
Qty: 90 TABLET | Refills: 1 | Status: SHIPPED | OUTPATIENT
Start: 2023-12-15

## 2023-12-15 NOTE — PROGRESS NOTES
Haylee Nunn is a 37 y.o. female who was seen by synchronous (real-time) audio-video technology on 12/15/2023 for Emesis and Weight Loss (Vomiting and weight loss since passing of family member (mom))        Progress Note         PROGRESS NOTE  Name: Capo Squires   : 1980       ASSESSMENT/ PLAN:     Kee Moscoso was seen today for emesis and weight loss. Vomiting, unspecified vomiting type, unspecified whether nausea present  -     FAUSTO Olivas MD, Gastroenterology, [place]  -      ABDOMEN COMPLETE; Future    Epigastric pain  -     pantoprazole (PROTONIX) 40 MG tablet; Take 1 tablet by mouth every morning (before breakfast)  -     FAUSTO Olivas MD, Gastroenterology, [address] [place] [address]; Future    No follow-ups on file. I have reviewed the patient's medications and risks/side effects/benefits were discussed. Diagnosis(-es) explained to patient and questions answered. Literature provided where appropriate. SUBJECTIVE  Ms. Capo Squires presents today acutely for     Chief Complaint   Patient presents with    Emesis    Weight Loss     Vomiting and weight loss since passing of family member (mom)       She has had nausea and vomiting--clear or stomach contents. \"It has been happening for a while, but a lot worse after mom's death in July. \"  Last emesis was about a week ago, but for a while it was every day. She occasionally has a bit of epigastric abdominal pain, which     She has lost weight: Currently 235 lb. Shelby Estrella \"I'm not eating well. \"   Wt Readings from Last 3 Encounters:   23 112.9 kg (249 lb)   23 114.8 kg (253 lb)   23 114.9 kg (253 lb 3.2 oz)     She experiences a lot of grief. \"I'm not on antidepressants. \" \"I do edibles and that helps. \" Also smokes marijuana. Once or twice in a week. OBJECTIVE  There were no vitals taken for this visit. Gen: Pleasant 37 y.o.  female in NAD.              Objective:          No data

## 2023-12-15 NOTE — PROGRESS NOTES
1. \"Have you been to the ER, urgent care clinic since your last visit? Hospitalized since your last visit? \" Yes [facility] for bronchitis (11/2023)    2. \"Have you seen or consulted any other health care providers outside of the [address] [place] [address] since your last visit? \" Yes Deon      3. For patients aged 43-73: Has the patient had a colonoscopy / FIT/ Cologuard? NA - based on age      If the patient is female:    4. For patients aged 43-66: Has the patient had a mammogram within the past 2 years? Yes - no Care Gap present      5. For patients aged 21-65: Has the patient had a pap smear?  Yes - no Care Gap present

## 2024-01-12 RX ORDER — DILTIAZEM HYDROCHLORIDE 120 MG/1
120 CAPSULE, COATED, EXTENDED RELEASE ORAL DAILY
Qty: 90 CAPSULE | Refills: 1 | Status: SHIPPED | OUTPATIENT
Start: 2024-01-12

## 2024-01-12 NOTE — TELEPHONE ENCOUNTER
Requested Prescriptions     Signed Prescriptions Disp Refills    dilTIAZem (CARDIZEM CD) 120 MG extended release capsule 90 capsule 1     Sig: TAKE 1 CAPSULE BY MOUTH DAILY     Authorizing Provider: ISIS RAMIRES     Ordering User: LUIS NIELSEN Md    Future Appointments   Date Time Provider Department Center   5/22/2024  2:20 PM Tony Valdes MD [facility]

## 2024-02-02 RX ORDER — METOPROLOL TARTRATE 100 MG/1
100 TABLET ORAL 2 TIMES DAILY
Qty: 180 TABLET | Refills: 1 | Status: SHIPPED | OUTPATIENT
Start: 2024-02-02

## 2024-02-02 NOTE — TELEPHONE ENCOUNTER
Requested Prescriptions     Signed Prescriptions Disp Refills    metoprolol (LOPRESSOR) 100 MG tablet 180 tablet 1     Sig: TAKE 1 TABLET BY MOUTH TWICE A DAY     Authorizing Provider: YAMILETH VALDES     Ordering User: LUIS NIELSEN MD    Future Appointments   Date Time Provider Department Center   5/22/2024  2:20 PM Yamileth Valdes MD [facility]

## 2024-03-23 LAB — MAMMOGRAPHY, EXTERNAL: NORMAL

## 2024-04-16 ENCOUNTER — OFFICE VISIT (OUTPATIENT)
Age: 44
End: 2024-04-16
Payer: COMMERCIAL

## 2024-04-16 VITALS
WEIGHT: 229 LBS | BODY MASS INDEX: 38.15 KG/M2 | HEIGHT: 65 IN | HEART RATE: 57 BPM | DIASTOLIC BLOOD PRESSURE: 81 MMHG | RESPIRATION RATE: 18 BRPM | SYSTOLIC BLOOD PRESSURE: 148 MMHG | TEMPERATURE: 97.7 F | OXYGEN SATURATION: 94 %

## 2024-04-16 DIAGNOSIS — E66.01 SEVERE OBESITY (BMI 35.0-39.9) WITH COMORBIDITY (HCC): ICD-10-CM

## 2024-04-16 DIAGNOSIS — J45.20 MILD INTERMITTENT ASTHMA, UNCOMPLICATED: ICD-10-CM

## 2024-04-16 DIAGNOSIS — F41.8 ANXIETY WITH DEPRESSION: ICD-10-CM

## 2024-04-16 DIAGNOSIS — I10 PRIMARY HYPERTENSION: Primary | ICD-10-CM

## 2024-04-16 DIAGNOSIS — Z80.0 FAMILY HISTORY OF COLON CANCER IN FATHER: ICD-10-CM

## 2024-04-16 DIAGNOSIS — R73.9 BORDERLINE HYPERGLYCEMIA: ICD-10-CM

## 2024-04-16 LAB
ALBUMIN SERPL-MCNC: 3.2 G/DL (ref 3.5–5)
ALBUMIN/GLOB SERPL: 0.9 (ref 1.1–2.2)
ALP SERPL-CCNC: 249 U/L (ref 45–117)
ALT SERPL-CCNC: 39 U/L (ref 12–78)
ANION GAP SERPL CALC-SCNC: 0 MMOL/L (ref 5–15)
AST SERPL-CCNC: 75 U/L (ref 15–37)
BASOPHILS # BLD: 0.1 K/UL (ref 0–0.1)
BASOPHILS NFR BLD: 1 % (ref 0–1)
BILIRUB SERPL-MCNC: 0.8 MG/DL (ref 0.2–1)
BUN SERPL-MCNC: 10 MG/DL (ref 6–20)
BUN/CREAT SERPL: 13 (ref 12–20)
CALCIUM SERPL-MCNC: 9.5 MG/DL (ref 8.5–10.1)
CHLORIDE SERPL-SCNC: 104 MMOL/L (ref 97–108)
CHOLEST SERPL-MCNC: 218 MG/DL
CO2 SERPL-SCNC: 37 MMOL/L (ref 21–32)
CREAT SERPL-MCNC: 0.76 MG/DL (ref 0.55–1.02)
DIFFERENTIAL METHOD BLD: ABNORMAL
EOSINOPHIL # BLD: 0.3 K/UL (ref 0–0.4)
EOSINOPHIL NFR BLD: 4 % (ref 0–7)
ERYTHROCYTE [DISTWIDTH] IN BLOOD BY AUTOMATED COUNT: 14.5 % (ref 11.5–14.5)
EST. AVERAGE GLUCOSE BLD GHB EST-MCNC: 105 MG/DL
GLOBULIN SER CALC-MCNC: 3.7 G/DL (ref 2–4)
GLUCOSE SERPL-MCNC: 98 MG/DL (ref 65–100)
HBA1C MFR BLD: 5.3 % (ref 4–5.6)
HCT VFR BLD AUTO: 47.4 % (ref 35–47)
HDLC SERPL-MCNC: 63 MG/DL
HDLC SERPL: 3.5 (ref 0–5)
HGB BLD-MCNC: 15 G/DL (ref 11.5–16)
IMM GRANULOCYTES # BLD AUTO: 0 K/UL (ref 0–0.04)
IMM GRANULOCYTES NFR BLD AUTO: 0 % (ref 0–0.5)
LDLC SERPL CALC-MCNC: 142 MG/DL (ref 0–100)
LYMPHOCYTES # BLD: 1.2 K/UL (ref 0.8–3.5)
LYMPHOCYTES NFR BLD: 16 % (ref 12–49)
MCH RBC QN AUTO: 30.6 PG (ref 26–34)
MCHC RBC AUTO-ENTMCNC: 31.6 G/DL (ref 30–36.5)
MCV RBC AUTO: 96.7 FL (ref 80–99)
MONOCYTES # BLD: 0.7 K/UL (ref 0–1)
MONOCYTES NFR BLD: 9 % (ref 5–13)
NEUTS SEG # BLD: 5.3 K/UL (ref 1.8–8)
NEUTS SEG NFR BLD: 70 % (ref 32–75)
NRBC # BLD: 0 K/UL (ref 0–0.01)
NRBC BLD-RTO: 0 PER 100 WBC
PLATELET # BLD AUTO: 288 K/UL (ref 150–400)
PMV BLD AUTO: 10.9 FL (ref 8.9–12.9)
POTASSIUM SERPL-SCNC: 3.6 MMOL/L (ref 3.5–5.1)
PROT SERPL-MCNC: 6.9 G/DL (ref 6.4–8.2)
RBC # BLD AUTO: 4.9 M/UL (ref 3.8–5.2)
SODIUM SERPL-SCNC: 141 MMOL/L (ref 136–145)
TRIGL SERPL-MCNC: 65 MG/DL
VLDLC SERPL CALC-MCNC: 13 MG/DL
WBC # BLD AUTO: 7.6 K/UL (ref 3.6–11)

## 2024-04-16 PROCEDURE — 3077F SYST BP >= 140 MM HG: CPT | Performed by: INTERNAL MEDICINE

## 2024-04-16 PROCEDURE — 3079F DIAST BP 80-89 MM HG: CPT | Performed by: INTERNAL MEDICINE

## 2024-04-16 PROCEDURE — 99214 OFFICE O/P EST MOD 30 MIN: CPT | Performed by: INTERNAL MEDICINE

## 2024-04-16 RX ORDER — ALBUTEROL SULFATE 2.5 MG/3ML
2.5 SOLUTION RESPIRATORY (INHALATION) 4 TIMES DAILY PRN
Qty: 120 EACH | Refills: 3 | Status: SHIPPED | OUTPATIENT
Start: 2024-04-16

## 2024-04-16 RX ORDER — ALBUTEROL SULFATE 90 UG/1
AEROSOL, METERED RESPIRATORY (INHALATION)
Qty: 8.5 G | Refills: 11 | Status: SHIPPED | OUTPATIENT
Start: 2024-04-16

## 2024-04-16 SDOH — ECONOMIC STABILITY: INCOME INSECURITY: HOW HARD IS IT FOR YOU TO PAY FOR THE VERY BASICS LIKE FOOD, HOUSING, MEDICAL CARE, AND HEATING?: NOT HARD AT ALL

## 2024-04-16 SDOH — ECONOMIC STABILITY: FOOD INSECURITY: WITHIN THE PAST 12 MONTHS, YOU WORRIED THAT YOUR FOOD WOULD RUN OUT BEFORE YOU GOT MONEY TO BUY MORE.: NEVER TRUE

## 2024-04-16 SDOH — ECONOMIC STABILITY: FOOD INSECURITY: WITHIN THE PAST 12 MONTHS, THE FOOD YOU BOUGHT JUST DIDN'T LAST AND YOU DIDN'T HAVE MONEY TO GET MORE.: NEVER TRUE

## 2024-04-16 ASSESSMENT — PATIENT HEALTH QUESTIONNAIRE - PHQ9
1. LITTLE INTEREST OR PLEASURE IN DOING THINGS: SEVERAL DAYS
SUM OF ALL RESPONSES TO PHQ QUESTIONS 1-9: 2
SUM OF ALL RESPONSES TO PHQ9 QUESTIONS 1 & 2: 2
SUM OF ALL RESPONSES TO PHQ QUESTIONS 1-9: 2
SUM OF ALL RESPONSES TO PHQ QUESTIONS 1-9: 2
2. FEELING DOWN, DEPRESSED OR HOPELESS: SEVERAL DAYS
SUM OF ALL RESPONSES TO PHQ QUESTIONS 1-9: 2

## 2024-04-16 NOTE — PROGRESS NOTES
\"Have you been to the ER, urgent care clinic since your last visit?  Hospitalized since your last visit?\"    NO    “Have you seen or consulted any other health care providers outside of [facility] since your last visit?”    NO     “Have you had a pap smear?”    NO    Date of last Cervical Cancer screen (HPV or PAP): 4/13/2017             Click Here for Release of Records Request  
father; Hypertension in her mother.     SH: She works at [employer]. She reports that she has been smoking cigarettes. She has never used smokeless tobacco. She reports current alcohol use of about 7.0 standard drinks of alcohol per week. She reports current drug use. Drug: Marijuana (Weed). She has a bottle of wine per week.     ROS: See above; Complete ROS otherwise negative.     OBJECTIVE:   Vitals: BP (!) 148/81 (Site: Left Upper Arm, Position: Sitting, Cuff Size: Large Adult)   Pulse 57   Temp 97.7 °F (36.5 °C) (Temporal)   Resp 18   Ht 1.651 m (5' 5\")   Wt 103.9 kg (229 lb)   SpO2 94%   BMI 38.11 kg/m²    Gen: Pleasant 43 y.o.  female in NAD.  HEENT: PERRLA. EOMI. OP moist and pink.  Neck: Supple.  No LAD. HEART: RRR, No M/G/R.    LUNGS: CTAB No W/R.  ABDOMEN: S, NT, ND, BS+.   EXTREMITIES: Warm. No C/C/E.  MUSCULOSKELETAL: Normal ROM, muscle strength 5/5 all groups.  NEURO: Alert and oriented x 3.  Cranial nerves grossly intact.  No focal sensory or motor deficits noted. SKIN: Warm. Dry. No rashes or other lesions noted.    Lab Results   Component Value Date/Time     06/23/2023 10:07 AM    K 4.1 06/23/2023 10:07 AM     06/23/2023 10:07 AM    CO2 36 06/23/2023 10:07 AM    BUN 15 06/23/2023 10:07 AM    GFRAA >60 01/06/2021 01:48 PM    ALT 56 06/23/2023 10:07 AM    GLOB 3.8 06/23/2023 10:07 AM       Lab Results   Component Value Date/Time    CHOL 231 06/23/2023 10:07 AM    HDL 59 06/23/2023 10:07 AM        No results found for: \"HBA1C\"    Lab Results   Component Value Date/Time    WBC 8.2 06/23/2023 10:07 AM    HGB 16.2 06/23/2023 10:07 AM    HCT 52.8 06/23/2023 10:07 AM     06/23/2023 10:07 AM    MCV 97.2 06/23/2023 10:07 AM

## 2024-04-18 ENCOUNTER — TELEPHONE (OUTPATIENT)
Age: 44
End: 2024-04-18

## 2024-04-18 NOTE — TELEPHONE ENCOUNTER
Patient states she needs a call back to get her Lab Results from this week. Please call. Thank you

## 2024-04-18 NOTE — TELEPHONE ENCOUNTER
----- Message from Gaye Kilgore sent at 4/18/2024  3:50 PM EDT -----  Subject: Message to Provider    QUESTIONS  Information for Provider? Pt wanting to know how she can go about getting   a medical marijuana card. if it can be done by Dr Hall.  ---------------------------------------------------------------------------  --------------  CALL BACK INFO  [phone]; OK to leave message on voicemail  ---------------------------------------------------------------------------  --------------  SCRIPT ANSWERS  Relationship to Patient? Self

## 2024-04-19 NOTE — TELEPHONE ENCOUNTER
Called and spoke to patient. Imformed patient we do not do anything with the marijuana card. She would have to go online.

## 2024-05-13 ENCOUNTER — TELEPHONE (OUTPATIENT)
Age: 44
End: 2024-05-13

## 2024-05-13 ENCOUNTER — OFFICE VISIT (OUTPATIENT)
Age: 44
End: 2024-05-13
Payer: COMMERCIAL

## 2024-05-13 VITALS
BODY MASS INDEX: 39.45 KG/M2 | DIASTOLIC BLOOD PRESSURE: 92 MMHG | SYSTOLIC BLOOD PRESSURE: 138 MMHG | HEIGHT: 65 IN | WEIGHT: 236.8 LBS | HEART RATE: 50 BPM | OXYGEN SATURATION: 96 %

## 2024-05-13 DIAGNOSIS — E66.01 SEVERE OBESITY (HCC): ICD-10-CM

## 2024-05-13 DIAGNOSIS — R00.1 BRADYCARDIA: ICD-10-CM

## 2024-05-13 DIAGNOSIS — Q25.1 COARCTATION OF AORTA: Primary | ICD-10-CM

## 2024-05-13 DIAGNOSIS — I10 BENIGN ESSENTIAL HTN: ICD-10-CM

## 2024-05-13 DIAGNOSIS — I47.10 PAROXYSMAL SVT (SUPRAVENTRICULAR TACHYCARDIA) (HCC): ICD-10-CM

## 2024-05-13 PROCEDURE — 99214 OFFICE O/P EST MOD 30 MIN: CPT | Performed by: INTERNAL MEDICINE

## 2024-05-13 PROCEDURE — 3080F DIAST BP >= 90 MM HG: CPT | Performed by: INTERNAL MEDICINE

## 2024-05-13 PROCEDURE — 3075F SYST BP GE 130 - 139MM HG: CPT | Performed by: INTERNAL MEDICINE

## 2024-05-13 PROCEDURE — 93000 ELECTROCARDIOGRAM COMPLETE: CPT | Performed by: INTERNAL MEDICINE

## 2024-05-13 RX ORDER — LOSARTAN POTASSIUM 50 MG/1
50 TABLET ORAL DAILY
Qty: 90 TABLET | Refills: 1 | Status: SHIPPED | OUTPATIENT
Start: 2024-05-13

## 2024-05-13 RX ORDER — CETIRIZINE HYDROCHLORIDE 5 MG/1
5 TABLET, CHEWABLE ORAL DAILY
COMMUNITY

## 2024-05-13 ASSESSMENT — PATIENT HEALTH QUESTIONNAIRE - PHQ9
1. LITTLE INTEREST OR PLEASURE IN DOING THINGS: NOT AT ALL
SUM OF ALL RESPONSES TO PHQ QUESTIONS 1-9: 0
2. FEELING DOWN, DEPRESSED OR HOPELESS: NOT AT ALL
SUM OF ALL RESPONSES TO PHQ QUESTIONS 1-9: 0
SUM OF ALL RESPONSES TO PHQ9 QUESTIONS 1 & 2: 0
SUM OF ALL RESPONSES TO PHQ QUESTIONS 1-9: 0
SUM OF ALL RESPONSES TO PHQ QUESTIONS 1-9: 0

## 2024-05-13 NOTE — TELEPHONE ENCOUNTER
Patient is calling to see if she can continue with the medicine she currently has until she can go  the new medicine from the pharmacy.    Spoke with the nurse Jacklyn  and she informed me to tell the patient no she can't continue with the medicine  told her to stop.She can start the new medicine tomorrow.     [phone]

## 2024-05-13 NOTE — PATIENT INSTRUCTIONS
An order has been placed for you for CT Scan . Please call to schedule this through Central Scheduling at [phone].    **NOTE: You will need to follow up about 1 week after testing to review your results. If your test date is changed for any reason, please change your follow up accordingly**

## 2024-05-13 NOTE — PROGRESS NOTES
[facility] [place]: Val[facility]  [phone]    HPI:  Ms. Dos Santos is a 42 yo F with h/o pre-hypertension, h/o ADS s/p patch at [facility] at age 5, cardiac MRI 10/2016 noted previous patch with no residual shunting, h/o AVNRT and atypical atrial flutter s/p ablation 12/2020 with Dr. Melgoza, asthma and HUNTER. Cardiac MRI 10/2016 also noted persistent left SVC between the left atrial appendage and the left superior pulmonary vein to join the coronary sinus; mild coarctation of the aorta between the ascending and aortic arch in the proximal descending thoracic aorta.  The coarctation measured 14 mm in diameter; distal to the coarctation post dilation at 28 mm and proximally 29 mm.  The ascending aorta was dilated at 43 x 38 mm.  She was seen by [facility] Dr. Schneider.    Since last visit she had been doing okay, but is having still some stress and grief from loss of her mother last July.  She has been trying to exercise and work on weight loss.  Overall she does have some baseline shortness of breath, but notes it has been different this past year.  No significant palpitations.  No hannah chest pain.  She did have recent blood work done and her LDL was mildly elevated and we talked about possibly a statin, but she wanted to hold off. She is compensated on exam with clear lungs and just trace lower extremity edema, I/VI systolic murmur left sternal border. Her EKG here was sinus bradycardia, heart rate of 43; previously 55 bpm.    Assessment/Plan:  1. Bradycardia.  She does have a lot of sleepiness and fatigue and I do think this is likely related partly to her bradycardia.  She is on Diltiazem and Metoprolol for history of SVT. Will go ahead and drop her Metoprolol from 100 to 25 mg twice a day.  Recommended she get a blood pressure monitor so we can follow her blood pressures and will add Losartan 50 mg to make sure her blood pressure is controlled.   2. Aortic coarct.  Noted by MRI in 2016.  She does have a persistent SVC as well, was seen by

## 2024-05-17 ENCOUNTER — TELEMEDICINE (OUTPATIENT)
Age: 44
End: 2024-05-17
Payer: COMMERCIAL

## 2024-05-17 DIAGNOSIS — E66.01 SEVERE OBESITY (BMI 35.0-39.9) WITH COMORBIDITY (HCC): Primary | ICD-10-CM

## 2024-05-17 DIAGNOSIS — R74.8 ALKALINE PHOSPHATASE ELEVATION: ICD-10-CM

## 2024-05-17 PROCEDURE — 99213 OFFICE O/P EST LOW 20 MIN: CPT | Performed by: INTERNAL MEDICINE

## 2024-05-17 NOTE — PROGRESS NOTES
Ted Dos Santos is a 43 y.o. female who was seen by synchronous (real-time) audio-video technology on 2024 for Other (Discuss weight loss meds)        Progress Note         PROGRESS NOTE  Name: Ted Dos Santos   : 1980       ASSESSMENT/ PLAN:     Ted was seen today for other.    Diagnoses and all orders for this visit:    Severe obesity (BMI 35.0-39.9) with comorbidity (HCC): We discussed potential risks/benefits of medication.   -     Semaglutide-Weight Management (WEGOVY) 0.25 MG/0.5ML SOAJ SC injection; Inject 0.25 mg into the skin every 7 days    Alkaline phosphatase elevation: Discussed possible etiologies, including biliary / gall bladder pathology. (It is recalled that she had a prior biopsy of liver that showed \"fatty liver\" but otherwise okay).   -     US ABDOMEN COMPLETE; Future    Follow-up and Dispositions    Return in about 6 months (around 2024) for Hypertension.       I have reviewed the patient's medications and risks/side effects/benefits were discussed. Diagnosis(-es) explained to patient and questions answered. Literature provided where appropriate.                       SUBJECTIVE  MsBulmaro Dos Santos presents today acutely for     Chief Complaint   Patient presents with    Other     Discuss weight loss meds       Weight: She has been trying to lose weight through exercise and altering her diet.  She is interested in trying \"the weight loss shot\".  She has a couple of friends who are on a GLP-1 injectable med who have told her about it and indicated that it has been working well.  Wt Readings from Last 3 Encounters:   24 107.4 kg (236 lb 12.8 oz)   24 103.9 kg (229 lb)   23 112.9 kg (249 lb)     In other news, she is to get CTA chest from Dr. Valdes.     HR has been low.     She has asked us to review her recent lab results.  The main abnormality is that we again see LFT elevations, specifically the AST and the alkaline phosphatase.  These have been stable for the

## 2024-05-17 NOTE — PROGRESS NOTES
\"Have you been to the ER, urgent care clinic since your last visit?  Hospitalized since your last visit?\"    NO    “Have you seen or consulted any other health care providers outside of [facility] since your last visit?”    Cardio      “Have you had a pap smear?”    NO    Date of last Cervical Cancer screen (HPV or PAP): 4/13/2017             Click Here for Release of Records Request

## 2024-05-23 ENCOUNTER — TELEPHONE (OUTPATIENT)
Age: 44
End: 2024-05-23

## 2024-05-24 NOTE — TELEPHONE ENCOUNTER
I left the patient know,  sent in a Rx for Zepbound. Patient verbalized understanding of information discussed w/ no further questions at this time.

## 2024-05-24 NOTE — TELEPHONE ENCOUNTER
I advised the patient, per our office policy GLP1 Agonists    [facility] Policy    Dear [facility] Patients,    Glucagon-like peptide-1 (GLP-1) agonists represent a class of medications used to treat type 2 diabetes mellitus and, in some cases, obesity.     Many commercial insurance companies are not approving the GLP-1 class medications, such as Ozempic and Wygovy, if you have never been diagnosed as diabetic. If your insurance denies your prescription, you will be given the choice to pay out of pocket or choose an alternative anti-obesity medications (AOM) alternative until further notice. There is no alternative injectable medication that will be prescribed.    Due to the increased demand of these medications and  shortages, you may be unable to obtain this from your local pharmacy until further notice. Your doctor does not have any control over this, and we will not  pharmacies for availability. It is the patient's responsibility to find a pharmacy with adequate supply of your medication for shortage related issues. We are happy to send your prescription to an alternative pharmacy with the provided information (pharmacy name, address, phone/fax #).    Please note, our clinical team will not initiate a prior authorization for non-diabetics on any GLP-1 medications. No exceptions will be accommodated regarding these. To date, prior auths are NOT being approved by your insurance.    I advised her to call her insurance company and find out what medicine they will cover for weight loss.  The patient would like another injectable weight loss med.  I told her I would let  know. Patient verbalized understanding of information discussed w/ no further questions at this time.

## 2024-07-17 RX ORDER — DILTIAZEM HYDROCHLORIDE 120 MG/1
120 CAPSULE, COATED, EXTENDED RELEASE ORAL DAILY
Qty: 90 CAPSULE | Refills: 3 | Status: SHIPPED | OUTPATIENT
Start: 2024-07-17

## 2024-07-17 NOTE — TELEPHONE ENCOUNTER
Requested Prescriptions     Signed Prescriptions Disp Refills    dilTIAZem (CARDIZEM CD) 120 MG extended release capsule 90 capsule 3     Sig: TAKE 1 CAPSULE BY MOUTH DAILY     Authorizing Provider: YAMILETH VALDES     Ordering User: LUIS NIELSEN MD    Future Appointments   Date Time Provider Department Center   11/25/2024 11:20 AM Yamileth [facility]des MD [facility]

## 2024-11-06 DIAGNOSIS — I47.10 PAROXYSMAL SVT (SUPRAVENTRICULAR TACHYCARDIA) (HCC): ICD-10-CM

## 2024-11-06 DIAGNOSIS — E66.01 SEVERE OBESITY: ICD-10-CM

## 2024-11-06 DIAGNOSIS — I10 BENIGN ESSENTIAL HTN: ICD-10-CM

## 2024-11-06 DIAGNOSIS — Q25.1 COARCTATION OF AORTA: ICD-10-CM

## 2024-11-06 RX ORDER — LOSARTAN POTASSIUM 50 MG/1
50 TABLET ORAL DAILY
Qty: 30 TABLET | Refills: 3 | Status: SHIPPED | OUTPATIENT
Start: 2024-11-06

## 2024-11-06 RX ORDER — METOPROLOL TARTRATE 25 MG/1
25 TABLET, FILM COATED ORAL 2 TIMES DAILY
Qty: 60 TABLET | Refills: 3 | Status: SHIPPED | OUTPATIENT
Start: 2024-11-06

## 2024-11-06 NOTE — TELEPHONE ENCOUNTER
Requested Prescriptions     Signed Prescriptions Disp Refills    metoprolol tartrate (LOPRESSOR) 25 MG tablet 60 tablet 3     Sig: TAKE 1 TABLET BY MOUTH 2 TIMES A DAY     Authorizing Provider: YAMILETH VALDES     Ordering User: LUIS NIELSEN    losartan (COZAAR) 50 MG tablet 30 tablet 3     Sig: TAKE 1 TABLET BY MOUTH DAILY     Authorizing Provider: YAMILETH VALDES     Ordering User: LUIS NIELSEN     VO per MD    Future Appointments   Date Time Provider Department Center   11/8/2024  3:30 PM Parth Hall MD [address] DEP   11/25/2024 11:20 AM Yamileth [facility]des MD [facility]   12/16/2024  2:15 PM Parth Hall MD [address] DEP

## 2024-11-13 ENCOUNTER — CLINICAL DOCUMENTATION (OUTPATIENT)
Age: 44
End: 2024-11-13

## 2024-11-14 ENCOUNTER — CLINICAL DOCUMENTATION (OUTPATIENT)
Age: 44
End: 2024-11-14

## 2024-11-14 NOTE — PROGRESS NOTES
Received LORA request from [employer]  on 11/13/24. Faxed request to [facility] on 11/24/24.

## 2024-12-16 ENCOUNTER — OFFICE VISIT (OUTPATIENT)
Age: 44
End: 2024-12-16
Payer: COMMERCIAL

## 2024-12-16 VITALS
HEART RATE: 62 BPM | HEIGHT: 65 IN | WEIGHT: 222.6 LBS | BODY MASS INDEX: 37.09 KG/M2 | OXYGEN SATURATION: 96 % | RESPIRATION RATE: 16 BRPM | TEMPERATURE: 97.7 F | DIASTOLIC BLOOD PRESSURE: 90 MMHG | SYSTOLIC BLOOD PRESSURE: 156 MMHG

## 2024-12-16 DIAGNOSIS — J45.20 MILD INTERMITTENT ASTHMA, UNCOMPLICATED: ICD-10-CM

## 2024-12-16 DIAGNOSIS — F41.8 ANXIETY WITH DEPRESSION: ICD-10-CM

## 2024-12-16 DIAGNOSIS — R73.9 BORDERLINE HYPERGLYCEMIA: ICD-10-CM

## 2024-12-16 DIAGNOSIS — R16.0 LIVER MASS: ICD-10-CM

## 2024-12-16 DIAGNOSIS — I10 PRIMARY HYPERTENSION: ICD-10-CM

## 2024-12-16 DIAGNOSIS — R82.90 URINE ABNORMALITY: Primary | ICD-10-CM

## 2024-12-16 DIAGNOSIS — N39.0 URINARY TRACT INFECTION WITH HEMATURIA, SITE UNSPECIFIED: ICD-10-CM

## 2024-12-16 DIAGNOSIS — R63.4 WEIGHT LOSS: ICD-10-CM

## 2024-12-16 DIAGNOSIS — R31.9 URINARY TRACT INFECTION WITH HEMATURIA, SITE UNSPECIFIED: ICD-10-CM

## 2024-12-16 LAB
BILIRUBIN, URINE, POC: ABNORMAL
BLOOD URINE, POC: ABNORMAL
GLUCOSE URINE, POC: ABNORMAL
KETONES, URINE, POC: ABNORMAL
LEUKOCYTE ESTERASE, URINE, POC: ABNORMAL
NITRITE, URINE, POC: POSITIVE
PH, URINE, POC: 6 (ref 4.6–8)
PROTEIN,URINE, POC: ABNORMAL
SPECIFIC GRAVITY, URINE, POC: 1.02 (ref 1–1.03)
URINALYSIS CLARITY, POC: CLEAR
URINALYSIS COLOR, POC: YELLOW
UROBILINOGEN, POC: ABNORMAL MG/DL

## 2024-12-16 PROCEDURE — 3080F DIAST BP >= 90 MM HG: CPT | Performed by: INTERNAL MEDICINE

## 2024-12-16 PROCEDURE — 81001 URINALYSIS AUTO W/SCOPE: CPT | Performed by: INTERNAL MEDICINE

## 2024-12-16 PROCEDURE — 3077F SYST BP >= 140 MM HG: CPT | Performed by: INTERNAL MEDICINE

## 2024-12-16 PROCEDURE — 99214 OFFICE O/P EST MOD 30 MIN: CPT | Performed by: INTERNAL MEDICINE

## 2024-12-16 RX ORDER — ONDANSETRON 4 MG/1
4 TABLET, FILM COATED ORAL DAILY PRN
Qty: 30 TABLET | Refills: 0 | Status: SHIPPED | OUTPATIENT
Start: 2024-12-16

## 2024-12-16 RX ORDER — POLYETHYLENE GLYCOL 3350, SODIUM SULFATE ANHYDROUS, SODIUM BICARBONATE, SODIUM CHLORIDE, POTASSIUM CHLORIDE 236; 22.74; 6.74; 5.86; 2.97 G/4L; G/4L; G/4L; G/4L; G/4L
POWDER, FOR SOLUTION ORAL
COMMUNITY

## 2024-12-16 RX ORDER — FLUCONAZOLE 150 MG/1
TABLET ORAL
COMMUNITY
Start: 2024-03-23

## 2024-12-16 RX ORDER — NITROFURANTOIN 25; 75 MG/1; MG/1
100 CAPSULE ORAL 2 TIMES DAILY
Qty: 20 CAPSULE | Refills: 0 | Status: SHIPPED | OUTPATIENT
Start: 2024-12-16 | End: 2024-12-26

## 2024-12-16 RX ORDER — NYSTATIN AND TRIAMCINOLONE ACETONIDE 100000; 1 [USP'U]/G; MG/G
OINTMENT TOPICAL
COMMUNITY
Start: 2024-03-23

## 2024-12-16 RX ORDER — DICLOFENAC SODIUM 75 MG/1
TABLET, DELAYED RELEASE ORAL
COMMUNITY
Start: 2024-10-18

## 2024-12-16 ASSESSMENT — PATIENT HEALTH QUESTIONNAIRE - PHQ9
2. FEELING DOWN, DEPRESSED OR HOPELESS: SEVERAL DAYS
6. FEELING BAD ABOUT YOURSELF - OR THAT YOU ARE A FAILURE OR HAVE LET YOURSELF OR YOUR FAMILY DOWN: NOT AT ALL
3. TROUBLE FALLING OR STAYING ASLEEP: NOT AT ALL
1. LITTLE INTEREST OR PLEASURE IN DOING THINGS: SEVERAL DAYS
8. MOVING OR SPEAKING SO SLOWLY THAT OTHER PEOPLE COULD HAVE NOTICED. OR THE OPPOSITE, BEING SO FIGETY OR RESTLESS THAT YOU HAVE BEEN MOVING AROUND A LOT MORE THAN USUAL: NOT AT ALL
7. TROUBLE CONCENTRATING ON THINGS, SUCH AS READING THE NEWSPAPER OR WATCHING TELEVISION: NOT AT ALL
9. THOUGHTS THAT YOU WOULD BE BETTER OFF DEAD, OR OF HURTING YOURSELF: NOT AT ALL
SUM OF ALL RESPONSES TO PHQ QUESTIONS 1-9: 2
10. IF YOU CHECKED OFF ANY PROBLEMS, HOW DIFFICULT HAVE THESE PROBLEMS MADE IT FOR YOU TO DO YOUR WORK, TAKE CARE OF THINGS AT HOME, OR GET ALONG WITH OTHER PEOPLE: NOT DIFFICULT AT ALL
SUM OF ALL RESPONSES TO PHQ QUESTIONS 1-9: 2
SUM OF ALL RESPONSES TO PHQ QUESTIONS 1-9: 2
SUM OF ALL RESPONSES TO PHQ9 QUESTIONS 1 & 2: 2
SUM OF ALL RESPONSES TO PHQ QUESTIONS 1-9: 2
4. FEELING TIRED OR HAVING LITTLE ENERGY: NOT AT ALL
5. POOR APPETITE OR OVEREATING: NOT AT ALL

## 2024-12-16 NOTE — PROGRESS NOTES
Ted Dos Santos is a 44 y.o. female    Chief Complaint   Patient presents with    Follow-up    Hypertension    Urinary Tract Infection       BP (!) 156/90   Pulse 62   Temp 97.7 °F (36.5 °C)   Resp 16   Ht 1.651 m (5' 5\")   Wt 101 kg (222 lb 9.6 oz)   SpO2 96%   BMI 37.04 kg/m²         1. Have you been to the ER, urgent care clinic since your last visit?  Hospitalized since your last visit? No    2. Have you seen or consulted any other health care providers outside of the [facility] since your last visit?  Include any pap smears or colon screening. No    Learning Assessment:       No data to display                Fall Risk Assessment:       No data to display                Abuse Screening:       No data to display                ADL Screening:       No data to display                
MG/0.5ML SOAJ SC injection Inject 0.25 mg into the skin every 7 days    cetirizine (ZYRTEC) 5 MG chewable tablet Take 1 tablet by mouth daily    albuterol sulfate HFA (PROVENTIL;VENTOLIN;PROAIR) 108 (90 Base) MCG/ACT inhaler INHALE ONE PUFF BY MOUTH EVERY 4 HOURS AS NEEDED FOR WHEEZING    albuterol (PROVENTIL) (2.5 MG/3ML) 0.083% nebulizer solution Take 3 mLs by nebulization 4 times daily as needed for Wheezing    cyanocobalamin 100 MCG tablet Take 1 tablet by mouth daily    ipratropium-albuterol (DUONEB) 0.5-2.5 (3) MG/3ML SOLN nebulizer solution Inhale into the lungs every 6 hours as needed     No current facility-administered medications for this visit.         FH: Her family history includes Colon Cancer in her father; Hypertension in her mother.     SH: She works at [employer]. She reports that she has been smoking cigarettes. She has never used smokeless tobacco. She reports current alcohol use of about 7.0 standard drinks of alcohol per week. She reports current drug use. Drug: Marijuana (Weed). She has a bottle of wine per week.     ROS: See above; Complete ROS otherwise negative.     OBJECTIVE:   Vitals: BP (!) 156/90   Pulse 62   Temp 97.7 °F (36.5 °C)   Resp 16   Ht 1.651 m (5' 5\")   Wt 101 kg (222 lb 9.6 oz)   SpO2 96%   BMI 37.04 kg/m²    Gen: Pleasant 44 y.o.  female in NAD.  HEENT: PERRLA. EOMI. OP moist and pink.  Neck: Supple.  No LAD. HEART: RRR, No M/G/R.    LUNGS: CTAB No W/R.  ABDOMEN: S, NT, ND, BS+.   EXTREMITIES: Warm. No C/C/E.  MUSCULOSKELETAL: Normal ROM, muscle strength 5/5 all groups.  NEURO: Alert and oriented x 3.  Cranial nerves grossly intact.  No focal sensory or motor deficits noted. SKIN: Warm. Dry. No rashes or other lesions noted.    Lab Results   Component Value Date/Time     04/16/2024 09:45 AM    K 3.6 04/16/2024 09:45 AM     04/16/2024 09:45 AM    CO2 37 04/16/2024 09:45 AM    BUN 10 04/16/2024 09:45 AM    GFRAA >60 01/06/2021 01:48 PM    ALT 39 04/16/2024

## 2024-12-17 LAB
ALBUMIN SERPL-MCNC: 3.3 G/DL (ref 3.5–5)
ALBUMIN/GLOB SERPL: 0.9 (ref 1.1–2.2)
ALP SERPL-CCNC: 227 U/L (ref 45–117)
ALT SERPL-CCNC: 61 U/L (ref 12–78)
ANION GAP SERPL CALC-SCNC: 1 MMOL/L (ref 2–12)
AST SERPL-CCNC: 88 U/L (ref 15–37)
BASOPHILS # BLD: 0.2 K/UL (ref 0–0.1)
BASOPHILS NFR BLD: 2 % (ref 0–1)
BILIRUB SERPL-MCNC: 0.8 MG/DL (ref 0.2–1)
BUN SERPL-MCNC: 14 MG/DL (ref 6–20)
BUN/CREAT SERPL: 12 (ref 12–20)
CALCIUM SERPL-MCNC: 9.2 MG/DL (ref 8.5–10.1)
CHLORIDE SERPL-SCNC: 101 MMOL/L (ref 97–108)
CHOLEST SERPL-MCNC: 237 MG/DL
CO2 SERPL-SCNC: 36 MMOL/L (ref 21–32)
CREAT SERPL-MCNC: 1.16 MG/DL (ref 0.55–1.02)
DIFFERENTIAL METHOD BLD: ABNORMAL
EOSINOPHIL # BLD: 0.2 K/UL (ref 0–0.4)
EOSINOPHIL NFR BLD: 2 % (ref 0–7)
ERYTHROCYTE [DISTWIDTH] IN BLOOD BY AUTOMATED COUNT: 13.7 % (ref 11.5–14.5)
EST. AVERAGE GLUCOSE BLD GHB EST-MCNC: 108 MG/DL
GLOBULIN SER CALC-MCNC: 3.7 G/DL (ref 2–4)
GLUCOSE SERPL-MCNC: 105 MG/DL (ref 65–100)
HBA1C MFR BLD: 5.4 % (ref 4–5.6)
HCT VFR BLD AUTO: 44.2 % (ref 35–47)
HDLC SERPL-MCNC: 55 MG/DL
HDLC SERPL: 4.3 (ref 0–5)
HGB BLD-MCNC: 13.9 G/DL (ref 11.5–16)
IMM GRANULOCYTES # BLD AUTO: 0 K/UL (ref 0–0.04)
IMM GRANULOCYTES NFR BLD AUTO: 0 % (ref 0–0.5)
LDLC SERPL CALC-MCNC: 161.8 MG/DL (ref 0–100)
LYMPHOCYTES # BLD: 1.4 K/UL (ref 0.8–3.5)
LYMPHOCYTES NFR BLD: 15 % (ref 12–49)
MCH RBC QN AUTO: 30.2 PG (ref 26–34)
MCHC RBC AUTO-ENTMCNC: 31.4 G/DL (ref 30–36.5)
MCV RBC AUTO: 96.1 FL (ref 80–99)
MONOCYTES # BLD: 0.9 K/UL (ref 0–1)
MONOCYTES NFR BLD: 10 % (ref 5–13)
NEUTS SEG # BLD: 6.7 K/UL (ref 1.8–8)
NEUTS SEG NFR BLD: 71 % (ref 32–75)
NRBC # BLD: 0 K/UL (ref 0–0.01)
NRBC BLD-RTO: 0 PER 100 WBC
PLATELET # BLD AUTO: 362 K/UL (ref 150–400)
PMV BLD AUTO: 11.3 FL (ref 8.9–12.9)
POTASSIUM SERPL-SCNC: 3.9 MMOL/L (ref 3.5–5.1)
PROT SERPL-MCNC: 7 G/DL (ref 6.4–8.2)
RBC # BLD AUTO: 4.6 M/UL (ref 3.8–5.2)
SODIUM SERPL-SCNC: 138 MMOL/L (ref 136–145)
TRIGL SERPL-MCNC: 101 MG/DL
TSH SERPL DL<=0.05 MIU/L-ACNC: 2.11 UIU/ML (ref 0.36–3.74)
VLDLC SERPL CALC-MCNC: 20.2 MG/DL
WBC # BLD AUTO: 9.4 K/UL (ref 3.6–11)

## 2024-12-18 ENCOUNTER — TELEPHONE (OUTPATIENT)
Age: 44
End: 2024-12-18

## 2024-12-18 DIAGNOSIS — I10 BENIGN ESSENTIAL HTN: ICD-10-CM

## 2024-12-18 DIAGNOSIS — I47.10 PAROXYSMAL SVT (SUPRAVENTRICULAR TACHYCARDIA) (HCC): ICD-10-CM

## 2024-12-18 DIAGNOSIS — Q25.1 COARCTATION OF AORTA: ICD-10-CM

## 2024-12-18 DIAGNOSIS — E66.01 SEVERE OBESITY: ICD-10-CM

## 2024-12-18 LAB
BACTERIA SPEC CULT: NORMAL
SERVICE CMNT-IMP: NORMAL

## 2024-12-18 RX ORDER — ATORVASTATIN CALCIUM 20 MG/1
20 TABLET, FILM COATED ORAL DAILY
Qty: 30 TABLET | Refills: 11 | Status: SHIPPED | OUTPATIENT
Start: 2024-12-18

## 2024-12-18 RX ORDER — ATORVASTATIN CALCIUM 20 MG/1
20 TABLET, FILM COATED ORAL DAILY
Qty: 30 TABLET | Refills: 0 | Status: SHIPPED | OUTPATIENT
Start: 2024-12-18 | End: 2024-12-18 | Stop reason: SDUPTHER

## 2024-12-18 NOTE — TELEPHONE ENCOUNTER
Patient states just spoke with a nurse about lab results. Patient states has a question.    Please call patient.

## 2024-12-18 NOTE — TELEPHONE ENCOUNTER
----- Message from Dr. Parth Hall MD sent at 12/18/2024  9:41 AM EST -----  Cholesterol is very high. If she is willing, start Lipitor 20 mg daily. BJF

## 2025-01-20 ENCOUNTER — HOSPITAL ENCOUNTER (OUTPATIENT)
Facility: HOSPITAL | Age: 45
Discharge: HOME | End: 2025-01-23
Attending: INTERNAL MEDICINE
Payer: COMMERCIAL

## 2025-01-20 DIAGNOSIS — R63.4 WEIGHT LOSS: ICD-10-CM

## 2025-01-20 DIAGNOSIS — R16.0 LIVER MASS: ICD-10-CM

## 2025-01-20 PROCEDURE — 6360000004 HC RX CONTRAST MEDICATION: Performed by: STUDENT IN AN ORGANIZED HEALTH CARE EDUCATION/TRAINING PROGRAM

## 2025-01-20 PROCEDURE — 74178 CT ABD&PLV WO CNTR FLWD CNTR: CPT

## 2025-01-20 RX ORDER — IOPAMIDOL 755 MG/ML
100 INJECTION, SOLUTION INTRAVASCULAR
Status: COMPLETED | OUTPATIENT
Start: 2025-01-20 | End: 2025-01-20

## 2025-01-20 RX ADMIN — IOPAMIDOL 100 ML: 755 INJECTION, SOLUTION INTRAVENOUS at 10:08

## 2025-01-21 ENCOUNTER — TELEPHONE (OUTPATIENT)
Age: 45
End: 2025-01-21

## 2025-01-21 NOTE — TELEPHONE ENCOUNTER
Dr. Duran with [facility] called in states would like a call back regarding obstruction on pt's CT scan. Please call to discuss.

## 2025-01-22 DIAGNOSIS — R16.0 LIVER MASS: Primary | ICD-10-CM

## 2025-01-22 DIAGNOSIS — N20.0 NEPHROLITHIASIS: ICD-10-CM

## 2025-01-23 ENCOUNTER — TELEPHONE (OUTPATIENT)
Age: 45
End: 2025-01-23

## 2025-01-23 NOTE — TELEPHONE ENCOUNTER
I spoke to the patient, I advised her per , \"She has had interval growth of the liver mass--let's get MRI liver. 2. She has kidney stones, including R ureteral obstruction with hydronephrosis. Let's refer to [facility] regarding the stones.\"    The patient wanted a f/u appt with PCP to discuss test results. Appt scheduled. Patient verbalized understanding of information discussed w/ no further questions at this time.

## 2025-01-24 ENCOUNTER — TELEMEDICINE (OUTPATIENT)
Age: 45
End: 2025-01-24
Payer: COMMERCIAL

## 2025-01-24 DIAGNOSIS — R11.2 NAUSEA AND VOMITING, UNSPECIFIED VOMITING TYPE: Primary | ICD-10-CM

## 2025-01-24 DIAGNOSIS — R05.1 ACUTE COUGH: ICD-10-CM

## 2025-01-24 DIAGNOSIS — N20.0 NEPHROLITHIASIS: ICD-10-CM

## 2025-01-24 DIAGNOSIS — R16.0 LIVER MASS: ICD-10-CM

## 2025-01-24 DIAGNOSIS — J45.41 MODERATE PERSISTENT ASTHMA WITH EXACERBATION: ICD-10-CM

## 2025-01-24 PROCEDURE — 99214 OFFICE O/P EST MOD 30 MIN: CPT | Performed by: INTERNAL MEDICINE

## 2025-01-24 RX ORDER — METHYLPREDNISOLONE 4 MG/1
TABLET ORAL
Qty: 1 KIT | Refills: 0 | Status: SHIPPED | OUTPATIENT
Start: 2025-01-24 | End: 2025-01-30

## 2025-01-24 RX ORDER — ONDANSETRON 4 MG/1
4 TABLET, FILM COATED ORAL DAILY PRN
Qty: 30 TABLET | Refills: 0 | Status: SHIPPED | OUTPATIENT
Start: 2025-01-24

## 2025-01-24 RX ORDER — FLUTICASONE PROPIONATE AND SALMETEROL 250; 50 UG/1; UG/1
1 POWDER RESPIRATORY (INHALATION) EVERY 12 HOURS
Qty: 60 EACH | Refills: 3 | Status: SHIPPED | OUTPATIENT
Start: 2025-01-24

## 2025-01-24 RX ORDER — BENZONATATE 100 MG/1
100 CAPSULE ORAL 2 TIMES DAILY PRN
Qty: 20 CAPSULE | Refills: 0 | Status: SHIPPED | OUTPATIENT
Start: 2025-01-24 | End: 2025-01-31

## 2025-01-24 SDOH — ECONOMIC STABILITY: FOOD INSECURITY: WITHIN THE PAST 12 MONTHS, THE FOOD YOU BOUGHT JUST DIDN'T LAST AND YOU DIDN'T HAVE MONEY TO GET MORE.: NEVER TRUE

## 2025-01-24 SDOH — ECONOMIC STABILITY: FOOD INSECURITY: WITHIN THE PAST 12 MONTHS, YOU WORRIED THAT YOUR FOOD WOULD RUN OUT BEFORE YOU GOT MONEY TO BUY MORE.: NEVER TRUE

## 2025-01-24 ASSESSMENT — PATIENT HEALTH QUESTIONNAIRE - PHQ9
4. FEELING TIRED OR HAVING LITTLE ENERGY: SEVERAL DAYS
7. TROUBLE CONCENTRATING ON THINGS, SUCH AS READING THE NEWSPAPER OR WATCHING TELEVISION: NOT AT ALL
2. FEELING DOWN, DEPRESSED OR HOPELESS: NOT AT ALL
10. IF YOU CHECKED OFF ANY PROBLEMS, HOW DIFFICULT HAVE THESE PROBLEMS MADE IT FOR YOU TO DO YOUR WORK, TAKE CARE OF THINGS AT HOME, OR GET ALONG WITH OTHER PEOPLE: NOT DIFFICULT AT ALL
SUM OF ALL RESPONSES TO PHQ9 QUESTIONS 1 & 2: 0
6. FEELING BAD ABOUT YOURSELF - OR THAT YOU ARE A FAILURE OR HAVE LET YOURSELF OR YOUR FAMILY DOWN: NOT AT ALL
3. TROUBLE FALLING OR STAYING ASLEEP: NOT AT ALL
SUM OF ALL RESPONSES TO PHQ QUESTIONS 1-9: 1
9. THOUGHTS THAT YOU WOULD BE BETTER OFF DEAD, OR OF HURTING YOURSELF: NOT AT ALL
1. LITTLE INTEREST OR PLEASURE IN DOING THINGS: NOT AT ALL
SUM OF ALL RESPONSES TO PHQ QUESTIONS 1-9: 1
5. POOR APPETITE OR OVEREATING: NOT AT ALL
8. MOVING OR SPEAKING SO SLOWLY THAT OTHER PEOPLE COULD HAVE NOTICED. OR THE OPPOSITE, BEING SO FIGETY OR RESTLESS THAT YOU HAVE BEEN MOVING AROUND A LOT MORE THAN USUAL: NOT AT ALL

## 2025-01-24 NOTE — PROGRESS NOTES
Ted Dos Santos is a 44 y.o. female who was seen by synchronous (real-time) audio-video technology on 2025 for Follow-up and Hypertension        Progress Note         PROGRESS NOTE  Name: Ted Dos Santos   : 1980       ASSESSMENT/ PLAN:     Ted was seen today for follow-up and hypertension.    Nausea and vomiting, unspecified vomiting type: There is nothing on her CT that would explain this, and apparently she has already had an EGD within the past year.  We can check a gastric emptying study, after which she will need to follow-up with her gastroenterologist, Dr. Rai.   -     ondansetron (ZOFRAN) 4 MG tablet; Take 1 tablet by mouth daily as needed for Nausea or Vomiting  -     NM GASTRIC EMPTYING; Future    Liver mass: She has had a prior biopsy.  The mass has grown, and MRI is recommended to further characterize it.  If malignancy cannot be fully excluded, then we would refer her back to a specialist.    Nephrolithiasis: We have referred her to nephrology.    Acute cough:   -     benzonatate (TESSALON) 100 MG capsule; Take 1 capsule by mouth 2 times daily as needed for Cough    Moderate persistent asthma with exacerbation  -     fluticasone-salmeterol (ADVAIR DISKUS) 250-50 MCG/ACT AEPB diskus inhaler; Inhale 1 puff into the lungs in the morning and 1 puff in the evening.  -     methylPREDNISolone (MEDROL DOSEPACK) 4 MG tablet; Take by mouth.    Return if symptoms worsen or fail to improve.     I have reviewed the patient's medications and risks/side effects/benefits were discussed. Diagnosis(-es) explained to patient and questions answered. Literature provided where appropriate.                       SUBJECTIVE  Ms. Ted Dos Santos presents today acutely for     Chief Complaint   Patient presents with    Follow-up    Hypertension       She is calling today to follow-up on her recent imaging study, CT scan with results noted below.    She had liver biopsy [facility] Dr. Aguilar, in .

## 2025-01-24 NOTE — PROGRESS NOTES
\"Have you been to the ER, urgent care clinic since your last visit?  Hospitalized since your last visit?\"    NO    “Have you seen or consulted any other health care providers outside our system since your last visit?”    NO     “Have you had a pap smear?”    NO    Date of last Cervical Cancer screen (HPV or PAP): 4/13/2017

## 2025-02-03 ENCOUNTER — OFFICE VISIT (OUTPATIENT)
Age: 45
End: 2025-02-03
Payer: COMMERCIAL

## 2025-02-03 VITALS
HEART RATE: 60 BPM | HEIGHT: 65 IN | BODY MASS INDEX: 36.82 KG/M2 | OXYGEN SATURATION: 94 % | SYSTOLIC BLOOD PRESSURE: 124 MMHG | WEIGHT: 221 LBS | DIASTOLIC BLOOD PRESSURE: 84 MMHG

## 2025-02-03 DIAGNOSIS — I47.10 PAROXYSMAL SVT (SUPRAVENTRICULAR TACHYCARDIA) (HCC): ICD-10-CM

## 2025-02-03 DIAGNOSIS — R07.9 CHEST PAIN, UNSPECIFIED TYPE: ICD-10-CM

## 2025-02-03 DIAGNOSIS — R06.02 SHORTNESS OF BREATH: ICD-10-CM

## 2025-02-03 DIAGNOSIS — Z87.891 HISTORY OF TOBACCO USE: ICD-10-CM

## 2025-02-03 DIAGNOSIS — I20.0 UNSTABLE ANGINA (HCC): Primary | ICD-10-CM

## 2025-02-03 DIAGNOSIS — I10 BENIGN ESSENTIAL HTN: ICD-10-CM

## 2025-02-03 DIAGNOSIS — Q25.1 AORTA COARCTATION: ICD-10-CM

## 2025-02-03 DIAGNOSIS — Z87.74 H/O ATRIAL SEPTAL DEFECT REPAIR: ICD-10-CM

## 2025-02-03 PROCEDURE — G2211 COMPLEX E/M VISIT ADD ON: HCPCS | Performed by: INTERNAL MEDICINE

## 2025-02-03 PROCEDURE — 99214 OFFICE O/P EST MOD 30 MIN: CPT | Performed by: INTERNAL MEDICINE

## 2025-02-03 PROCEDURE — 3079F DIAST BP 80-89 MM HG: CPT | Performed by: INTERNAL MEDICINE

## 2025-02-03 PROCEDURE — 3074F SYST BP LT 130 MM HG: CPT | Performed by: INTERNAL MEDICINE

## 2025-02-03 ASSESSMENT — PATIENT HEALTH QUESTIONNAIRE - PHQ9
SUM OF ALL RESPONSES TO PHQ QUESTIONS 1-9: 0
1. LITTLE INTEREST OR PLEASURE IN DOING THINGS: NOT AT ALL
SUM OF ALL RESPONSES TO PHQ QUESTIONS 1-9: 0
SUM OF ALL RESPONSES TO PHQ9 QUESTIONS 1 & 2: 0
SUM OF ALL RESPONSES TO PHQ QUESTIONS 1-9: 0
SUM OF ALL RESPONSES TO PHQ QUESTIONS 1-9: 0
2. FEELING DOWN, DEPRESSED OR HOPELESS: NOT AT ALL

## 2025-02-03 NOTE — PROGRESS NOTES
[facility] [place]: [facility]  [phone]    HPI:  Ms. Dos Santos is a 45 yo F with h/o pre-hypertension, [place] [facility] [place] 5, cardiac MRI 10/2016 noted previous patch with no residual shunting, h/o AVNRT and atypical atrial flutter s/p ablation 12/2020 with Dr. Melgoza, asthma and HUNTER. Cardiac MRI 10/2016 also noted persistent left SVC between the left atrial appendage and the left superior pulmonary vein to join the coronary sinus; mild coarctation of the aorta between the ascending and aortic arch in the proximal descending thoracic aorta.  The coarctation measured 14 mm in diameter; distal to the coarctation post dilation at 28 mm and proximally 29 mm.  The ascending aorta was dilated at 43 x 38 mm.  She was seen by [facility] Dr. Schneider.    These last few months she has been dealing with nausea, vomiting and is followed by GI Dr. Rai.  She did see her primary care, who is ordering a gastric emptying study.  She also has issues with liver mass.  With regard to her heart, she has been more easily short of breath over the last few months with exertion.  She does get occasional chest discomfort, substernal though.  She relates a lot of this discomfort to stress and possible reflux.  She was tearful at times. She is compensated on exam with clear lungs and 1+ lower extremity edema, I/VI systolic murmur left sternal border.    Assessment and Plan:  1. Unstable angina.  Chest pain, shortness of breath with typical/atypical features; will proceed with an echo and stress test for further evaluation.  She cannot do a treadmill due to her aortic coarct and she is on medication for her paroxysmal SVT that she cannot hold for the treadmill and will do this Lexiscan.  2. Aortic coarctation.  Noted by MRI in 2016.  She does have a persistent SVC.  Previously followed at [facility] by Dr. Schneider.   3. ASD status post repair.  4. Tobacco use.  Encouraged cessation.  5. Essential hypertension.  Blood pressure is controlled.  6.

## 2025-02-10 ENCOUNTER — TRANSCRIBE ORDERS (OUTPATIENT)
Facility: HOSPITAL | Age: 45
End: 2025-02-10

## 2025-02-10 DIAGNOSIS — N20.1 CALCULUS OF URETER: Primary | ICD-10-CM

## 2025-02-13 ENCOUNTER — TELEPHONE (OUTPATIENT)
Age: 45
End: 2025-02-13

## 2025-02-13 NOTE — TELEPHONE ENCOUNTER
Caller states:    Wants fluid pill for knee  State needs today  Use: [facility] [id number] [facility] [address] - P [phone] - F [phone]      Is this a new problem:    no    Date of last appointment:    1/24/2025     Please call patient back to advise or send med.

## 2025-02-13 NOTE — TELEPHONE ENCOUNTER
Patient calling again    Cannot put shoe on, cannot walk, painful    States needs to know what to do.      (She did schedule soonest acute as a vv with wale tomorrow 2/14)

## 2025-02-14 ENCOUNTER — TELEMEDICINE (OUTPATIENT)
Age: 45
End: 2025-02-14
Payer: COMMERCIAL

## 2025-02-14 DIAGNOSIS — R73.01 IFG (IMPAIRED FASTING GLUCOSE): ICD-10-CM

## 2025-02-14 DIAGNOSIS — R60.0 LOCALIZED EDEMA: ICD-10-CM

## 2025-02-14 DIAGNOSIS — I10 PRIMARY HYPERTENSION: ICD-10-CM

## 2025-02-14 DIAGNOSIS — K76.0 NAFLD (NONALCOHOLIC FATTY LIVER DISEASE): ICD-10-CM

## 2025-02-14 DIAGNOSIS — E66.01 SEVERE OBESITY: ICD-10-CM

## 2025-02-14 DIAGNOSIS — I47.10 PAROXYSMAL SVT (SUPRAVENTRICULAR TACHYCARDIA) (HCC): ICD-10-CM

## 2025-02-14 DIAGNOSIS — E78.2 MIXED HYPERLIPIDEMIA: ICD-10-CM

## 2025-02-14 DIAGNOSIS — M79.89 FOOT SWELLING: Primary | ICD-10-CM

## 2025-02-14 DIAGNOSIS — N30.00 ACUTE CYSTITIS WITHOUT HEMATURIA: ICD-10-CM

## 2025-02-14 PROCEDURE — 99214 OFFICE O/P EST MOD 30 MIN: CPT | Performed by: INTERNAL MEDICINE

## 2025-02-14 RX ORDER — FUROSEMIDE 20 MG/1
20 TABLET ORAL DAILY
Qty: 14 TABLET | Refills: 0 | Status: SHIPPED | OUTPATIENT
Start: 2025-02-14

## 2025-02-14 RX ORDER — NITROFURANTOIN 25; 75 MG/1; MG/1
100 CAPSULE ORAL 2 TIMES DAILY
Qty: 10 CAPSULE | Refills: 0 | Status: SHIPPED | OUTPATIENT
Start: 2025-02-14 | End: 2025-02-19

## 2025-02-14 ASSESSMENT — PATIENT HEALTH QUESTIONNAIRE - PHQ9
2. FEELING DOWN, DEPRESSED OR HOPELESS: NOT AT ALL
SUM OF ALL RESPONSES TO PHQ QUESTIONS 1-9: 0
1. LITTLE INTEREST OR PLEASURE IN DOING THINGS: NOT AT ALL
SUM OF ALL RESPONSES TO PHQ QUESTIONS 1-9: 0
SUM OF ALL RESPONSES TO PHQ QUESTIONS 1-9: 0
SUM OF ALL RESPONSES TO PHQ9 QUESTIONS 1 & 2: 0
SUM OF ALL RESPONSES TO PHQ QUESTIONS 1-9: 0

## 2025-02-14 ASSESSMENT — ENCOUNTER SYMPTOMS: SHORTNESS OF BREATH: 0

## 2025-02-14 NOTE — PROGRESS NOTES
\"Have you been to the ER, urgent care clinic since your last visit?  Hospitalized since your last visit?\"    NO    “Have you seen or consulted any other health care providers outside our system since your last visit?”    NO     “Have you had a pap smear?”    NO    Date of last Cervical Cancer screen (HPV or PAP): 4/13/2017            
questions, which were answered. Pt was instructed to call with any concerns or problems.    I have reviewed the note documented by the scribe. The services provided are my own. The documentation is accurate.  Ted SWANSON Dos Santos, was evaluated through a synchronous (real-time) audio-video encounter. The patient (or guardian if applicable) is aware that this is a billable service, which includes applicable co-pays. This Virtual Visit was conducted with patient's (and/or legal guardian's) consent. Patient identification was verified, and a caregiver was present when appropriate.   The patient was located at Home[address] [place] [address]  Provider was located at Home (Appt Dept State): [place]  Confirm you are appropriately licensed, registered, or certified to deliver care in the state where the patient is located as indicated above. If you are not or unsure, please re-schedule the visit: Yes, I confirm.      --Letty Barnes on 2/14/2025 at 8:26 AM  An electronic signature was used to authenticate this note.

## 2025-02-17 ENCOUNTER — APPOINTMENT (OUTPATIENT)
Facility: HOSPITAL | Age: 45
End: 2025-02-17
Payer: COMMERCIAL

## 2025-02-17 ENCOUNTER — TELEPHONE (OUTPATIENT)
Age: 45
End: 2025-02-17

## 2025-02-17 ENCOUNTER — HOSPITAL ENCOUNTER (EMERGENCY)
Facility: HOSPITAL | Age: 45
Discharge: HOME | End: 2025-02-17
Attending: STUDENT IN AN ORGANIZED HEALTH CARE EDUCATION/TRAINING PROGRAM
Payer: COMMERCIAL

## 2025-02-17 VITALS
RESPIRATION RATE: 18 BRPM | OXYGEN SATURATION: 93 % | SYSTOLIC BLOOD PRESSURE: 134 MMHG | TEMPERATURE: 98.1 F | WEIGHT: 220 LBS | BODY MASS INDEX: 36.61 KG/M2 | HEART RATE: 66 BPM | DIASTOLIC BLOOD PRESSURE: 88 MMHG

## 2025-02-17 DIAGNOSIS — R60.0 LEG EDEMA: Primary | ICD-10-CM

## 2025-02-17 LAB
ALBUMIN SERPL-MCNC: 2.8 G/DL (ref 3.5–5)
ALBUMIN/GLOB SERPL: 0.6 (ref 1.1–2.2)
ALP SERPL-CCNC: 189 U/L (ref 45–117)
ALT SERPL-CCNC: ABNORMAL U/L (ref 12–78)
ANION GAP SERPL CALC-SCNC: 3 MMOL/L (ref 2–12)
APPEARANCE UR: CLEAR
AST SERPL-CCNC: ABNORMAL U/L (ref 15–37)
BACTERIA URNS QL MICRO: NEGATIVE /HPF
BASOPHILS # BLD: 0.08 K/UL (ref 0–0.1)
BASOPHILS NFR BLD: 0.8 % (ref 0–1)
BILIRUB SERPL-MCNC: 1 MG/DL (ref 0.2–1)
BILIRUB UR QL: NEGATIVE
BUN SERPL-MCNC: 11 MG/DL (ref 6–20)
BUN/CREAT SERPL: 10 (ref 12–20)
CALCIUM SERPL-MCNC: 9.1 MG/DL (ref 8.5–10.1)
CHLORIDE SERPL-SCNC: 102 MMOL/L (ref 97–108)
CO2 SERPL-SCNC: 31 MMOL/L (ref 21–32)
COLOR UR: ABNORMAL
CREAT SERPL-MCNC: 1.08 MG/DL (ref 0.55–1.02)
DIFFERENTIAL METHOD BLD: ABNORMAL
EOSINOPHIL # BLD: 0.31 K/UL (ref 0–0.4)
EOSINOPHIL NFR BLD: 3.3 % (ref 0–7)
EPITH CASTS URNS QL MICRO: ABNORMAL /LPF
ERYTHROCYTE [DISTWIDTH] IN BLOOD BY AUTOMATED COUNT: 15.9 % (ref 11.5–14.5)
GLOBULIN SER CALC-MCNC: 4.7 G/DL (ref 2–4)
GLUCOSE SERPL-MCNC: 92 MG/DL (ref 65–100)
GLUCOSE UR STRIP.AUTO-MCNC: NEGATIVE MG/DL
HCT VFR BLD AUTO: 42.8 % (ref 35–47)
HGB BLD-MCNC: 13.5 G/DL (ref 11.5–16)
HGB UR QL STRIP: ABNORMAL
HYALINE CASTS URNS QL MICRO: ABNORMAL /LPF (ref 0–2)
IMM GRANULOCYTES # BLD AUTO: 0.04 K/UL (ref 0–0.04)
IMM GRANULOCYTES NFR BLD AUTO: 0.4 % (ref 0–0.5)
KETONES UR QL STRIP.AUTO: NEGATIVE MG/DL
LEUKOCYTE ESTERASE UR QL STRIP.AUTO: NEGATIVE
LYMPHOCYTES # BLD: 1.31 K/UL (ref 0.8–3.5)
LYMPHOCYTES NFR BLD: 13.9 % (ref 12–49)
MCH RBC QN AUTO: 29.9 PG (ref 26–34)
MCHC RBC AUTO-ENTMCNC: 31.5 G/DL (ref 30–36.5)
MCV RBC AUTO: 94.9 FL (ref 80–99)
MONOCYTES # BLD: 1.16 K/UL (ref 0–1)
MONOCYTES NFR BLD: 12.3 % (ref 5–13)
NEUTS SEG # BLD: 6.52 K/UL (ref 1.8–8)
NEUTS SEG NFR BLD: 69.3 % (ref 32–75)
NITRITE UR QL STRIP.AUTO: NEGATIVE
NRBC # BLD: 0 K/UL (ref 0–0.01)
NRBC BLD-RTO: 0 PER 100 WBC
NT PRO BNP: 2059 PG/ML
PH UR STRIP: 6.5 (ref 5–8)
PLATELET # BLD AUTO: 202 K/UL (ref 150–400)
PMV BLD AUTO: 12.9 FL (ref 8.9–12.9)
POTASSIUM SERPL-SCNC: ABNORMAL MMOL/L (ref 3.5–5.1)
PROT SERPL-MCNC: 7.5 G/DL (ref 6.4–8.2)
PROT UR STRIP-MCNC: NEGATIVE MG/DL
RBC # BLD AUTO: 4.51 M/UL (ref 3.8–5.2)
RBC #/AREA URNS HPF: ABNORMAL /HPF (ref 0–5)
SODIUM SERPL-SCNC: 136 MMOL/L (ref 136–145)
SP GR UR REFRACTOMETRY: 1.01
TROPONIN I SERPL HS-MCNC: 14 NG/L (ref 0–51)
URINE CULTURE IF INDICATED: ABNORMAL
UROBILINOGEN UR QL STRIP.AUTO: 0.2 EU/DL (ref 0.2–1)
WBC # BLD AUTO: 9.4 K/UL (ref 3.6–11)
WBC URNS QL MICRO: ABNORMAL /HPF (ref 0–4)

## 2025-02-17 PROCEDURE — 6360000002 HC RX W HCPCS: Performed by: STUDENT IN AN ORGANIZED HEALTH CARE EDUCATION/TRAINING PROGRAM

## 2025-02-17 PROCEDURE — 83880 ASSAY OF NATRIURETIC PEPTIDE: CPT

## 2025-02-17 PROCEDURE — 36415 COLL VENOUS BLD VENIPUNCTURE: CPT

## 2025-02-17 PROCEDURE — 96374 THER/PROPH/DIAG INJ IV PUSH: CPT

## 2025-02-17 PROCEDURE — 84484 ASSAY OF TROPONIN QUANT: CPT

## 2025-02-17 PROCEDURE — 81001 URINALYSIS AUTO W/SCOPE: CPT

## 2025-02-17 PROCEDURE — 99284 EMERGENCY DEPT VISIT MOD MDM: CPT

## 2025-02-17 PROCEDURE — 80053 COMPREHEN METABOLIC PANEL: CPT

## 2025-02-17 PROCEDURE — 85025 COMPLETE CBC W/AUTO DIFF WBC: CPT

## 2025-02-17 PROCEDURE — 71045 X-RAY EXAM CHEST 1 VIEW: CPT

## 2025-02-17 RX ORDER — FUROSEMIDE 10 MG/ML
40 INJECTION INTRAMUSCULAR; INTRAVENOUS ONCE
Status: COMPLETED | OUTPATIENT
Start: 2025-02-17 | End: 2025-02-17

## 2025-02-17 RX ADMIN — FUROSEMIDE 40 MG: 10 INJECTION, SOLUTION INTRAMUSCULAR; INTRAVENOUS at 12:03

## 2025-02-17 ASSESSMENT — PAIN - FUNCTIONAL ASSESSMENT: PAIN_FUNCTIONAL_ASSESSMENT: 0-10

## 2025-02-17 ASSESSMENT — PAIN SCALES - GENERAL: PAINLEVEL_OUTOF10: 7

## 2025-02-17 NOTE — ED PROVIDER NOTES
EMERGENCY DEPARTMENT HISTORY AND PHYSICAL EXAM      Date: 2/17/2025  Patient Name: Ted Dos Santos    History of Presenting Illness     Chief Complaint   Patient presents with    Leg Swelling     Patient wheeled to triage w c/o acute onset of bilateral feet swelling.          HPI: History From: patient, History limited by: none  Ted Dos Santos, 44 y.o. female presents to the ED with cc of bilateral foot swelling.  This has been going on for over a week.  She was started on Lasix 20 daily by her doctor on Friday, however it has not significantly improved.  She denies acute shortness of breath, no chest pain, no fevers.  She has been on nitrofurantoin for urinary tract infection since Friday, reports some continued dysuria.  She denies any flank or abdominal pain, no fevers or diarrhea.  She has had a history of edema previously, however never usually this significant.  She also recently saw her orthopedic doctor, and on Friday had a ultrasound that was negative for DVT.          There are no other complaints, changes, or physical findings at this time.    PCP: Parth Hall MD    No current facility-administered medications on file prior to encounter.     Current Outpatient Medications on File Prior to Encounter   Medication Sig Dispense Refill    furosemide (LASIX) 20 MG tablet Take 1 tablet by mouth daily 14 tablet 0    nitrofurantoin, macrocrystal-monohydrate, (MACROBID) 100 MG capsule Take 1 capsule by mouth 2 times daily for 5 days 10 capsule 0    ondansetron (ZOFRAN) 4 MG tablet Take 1 tablet by mouth daily as needed for Nausea or Vomiting (Patient not taking: Reported on 2/3/2025) 30 tablet 0    fluticasone-salmeterol (ADVAIR DISKUS) 250-50 MCG/ACT AEPB diskus inhaler Inhale 1 puff into the lungs in the morning and 1 puff in the evening. 60 each 3    atorvastatin (LIPITOR) 20 MG tablet Take 1 tablet by mouth daily 30 tablet 11    diclofenac (VOLTAREN) 75 MG EC tablet  (Patient not taking: Reported on

## 2025-02-17 NOTE — TELEPHONE ENCOUNTER
Emergency Room / Urgent Care follow-up appt requested.    Details:  Diagnosis/Symptoms:   edema  Date of Visit:  2/17/2025 (2 hours)  Facility:   [facility] Emergency Department      Patient States:  Pt states fluid on heart     Asks if appt needed with foster.    Appointments:  Last seen by pcp:  1/24/2025     No available appts with pcp for this psr to schedule in requested time frame.  Pt states needs this week, pt states\"I could die by that time\" when offered soonest on 3/31      Please call pt directly to schedule as soon as able.

## 2025-02-17 NOTE — TELEPHONE ENCOUNTER
Patient requested a call back she went to the emergency room  and they requested her to be seen sooner due to build up fluid around the heart and lungs.    Patient requested a call back as soon as possible .        Contact Information  [phone] (Mobile)   [phone] (Home Phone)

## 2025-02-17 NOTE — TELEPHONE ENCOUNTER
Telephone call made to patient. Two patient identifiers verified.   Scheduled follow up with SHAYY Matute Rescheduled echo and stress test sooner. Went over instructions for stress test with patient.    Future Appointments   Date Time Provider Department Center   2/24/2025  8:20 AM Teresita Simpson APRN - [facility] AM[facility]   2/24/2025  9:30 AM [facility] CT MAIN 1 [facility]   3/10/2025  8:00 AM [facility] NUCLEAR 1 [facility]   3/10/2025 10:00 AM [facility] VASCULAR [facility]   8/25/2025  1:40 PM Tony [facility] MD [facility] AM[facility]

## 2025-02-18 ENCOUNTER — TELEMEDICINE (OUTPATIENT)
Age: 45
End: 2025-02-18
Payer: COMMERCIAL

## 2025-02-18 DIAGNOSIS — R60.9 EDEMA, UNSPECIFIED TYPE: Primary | ICD-10-CM

## 2025-02-18 PROCEDURE — 99213 OFFICE O/P EST LOW 20 MIN: CPT | Performed by: INTERNAL MEDICINE

## 2025-02-18 NOTE — PROGRESS NOTES
Ted Dos Santos is a 44 y.o. female who was seen by synchronous (real-time) audio-video technology on 2025 for Other and Follow-up (Swollen feet,  Fluid lung and heart)        Progress Note         PROGRESS NOTE  Name: Ted Dos Santos   : 1980       ASSESSMENT/ PLAN:     Ted was seen today for other and follow-up.    Edema, unspecified type: She is still symptomatic today but improved compared with yesterday.  Continue the diuretics.  Follow-up soon as planned with cardiology.    Kidney stone: She is not aware of having passed the stone and still has blood on urinalysis.  I suggested she could consider reaching out to [facility] to see if she could get a second opinion from their urologist.    Return if symptoms worsen or fail to improve.     I have reviewed the patient's medications and risks/side effects/benefits were discussed. Diagnosis(-es) explained to patient and questions answered. Literature provided where appropriate.                       SUBJECTIVE  Ms. Ted Dos Santos presents today acutely for     Chief Complaint   Patient presents with    Other    Follow-up     Swollen feet,  Fluid lung and heart     She was evaluated in the emergency department yesterday:    History From: patient, History limited by: none  Ted Dos Santos, 44 y.o. female presents to the ED with cc of bilateral foot swelling.  This has been going on for over a week.  She was started on Lasix 20 daily by her doctor on Friday, however it has not significantly improved.  She denies acute shortness of breath, no chest pain, no fevers.  She has been on nitrofurantoin for urinary tract infection since Friday, reports some continued dysuria.  She denies any flank or abdominal pain, no fevers or diarrhea.  She has had a history of edema previously, however never usually this significant.  She also recently saw her orthopedic doctor, and on Friday had a ultrasound that was negative for DVT.    She had lab work including a

## 2025-02-18 NOTE — PROGRESS NOTES
\"Have you been to the ER, urgent care clinic since your last visit?  Hospitalized since your last visit?\"    Ed/ Swollen feet    “Have you seen or consulted any other health care providers outside our system since your last visit?”    NO     “Have you had a pap smear?”    NO    Date of last Cervical Cancer screen (HPV or PAP): 4/13/2017

## 2025-02-20 ENCOUNTER — TELEPHONE (OUTPATIENT)
Age: 45
End: 2025-02-20

## 2025-02-20 ENCOUNTER — OFFICE VISIT (OUTPATIENT)
Age: 45
End: 2025-02-20
Payer: COMMERCIAL

## 2025-02-20 VITALS
HEART RATE: 65 BPM | HEIGHT: 65 IN | WEIGHT: 223 LBS | DIASTOLIC BLOOD PRESSURE: 86 MMHG | BODY MASS INDEX: 37.15 KG/M2 | OXYGEN SATURATION: 91 % | SYSTOLIC BLOOD PRESSURE: 128 MMHG

## 2025-02-20 DIAGNOSIS — Q25.1 AORTA COARCTATION: ICD-10-CM

## 2025-02-20 DIAGNOSIS — Z87.74 H/O ATRIAL SEPTAL DEFECT REPAIR: ICD-10-CM

## 2025-02-20 DIAGNOSIS — R60.0 BILATERAL LOWER EXTREMITY EDEMA: Primary | ICD-10-CM

## 2025-02-20 DIAGNOSIS — E88.09 HYPOALBUMINEMIA: ICD-10-CM

## 2025-02-20 DIAGNOSIS — I47.10 PAROXYSMAL SVT (SUPRAVENTRICULAR TACHYCARDIA): ICD-10-CM

## 2025-02-20 PROCEDURE — G2211 COMPLEX E/M VISIT ADD ON: HCPCS | Performed by: INTERNAL MEDICINE

## 2025-02-20 PROCEDURE — 3079F DIAST BP 80-89 MM HG: CPT | Performed by: INTERNAL MEDICINE

## 2025-02-20 PROCEDURE — 3074F SYST BP LT 130 MM HG: CPT | Performed by: INTERNAL MEDICINE

## 2025-02-20 PROCEDURE — 99214 OFFICE O/P EST MOD 30 MIN: CPT | Performed by: INTERNAL MEDICINE

## 2025-02-20 RX ORDER — FUROSEMIDE 40 MG/1
TABLET ORAL
Qty: 90 TABLET | Refills: 1 | Status: SHIPPED | OUTPATIENT
Start: 2025-02-20

## 2025-02-20 ASSESSMENT — PATIENT HEALTH QUESTIONNAIRE - PHQ9
SUM OF ALL RESPONSES TO PHQ9 QUESTIONS 1 & 2: 0
SUM OF ALL RESPONSES TO PHQ QUESTIONS 1-9: 0
2. FEELING DOWN, DEPRESSED OR HOPELESS: NOT AT ALL
SUM OF ALL RESPONSES TO PHQ QUESTIONS 1-9: 0
1. LITTLE INTEREST OR PLEASURE IN DOING THINGS: NOT AT ALL

## 2025-02-20 NOTE — PROGRESS NOTES
1. Have you been to the ER, urgent care clinic since your last visit?  Hospitalized since your last visit?Yes [facility] swelling in ankles , legs, and feet    2. Have you seen or consulted any other health care providers outside of the [facility] since your last visit?  Include any pap smears or colon screening. No

## 2025-02-20 NOTE — PROGRESS NOTES
[facility] [place]: [facility]  [phone]    HPI:  Ms. Dos Santos is a 45 yo F with h/o pre-hypertension, h/o ADS s/p patch at [facility] [place] at age 5, cardiac MRI 10/2016 noted previous patch with no residual shunting, h/o AVNRT and atypical atrial flutter s/p ablation 12/2020 with Dr. Melgoza, asthma and HUNTER. Cardiac MRI 10/2016 also noted persistent left SVC between the left atrial appendage and the left superior pulmonary vein to join the coronary sinus; mild coarctation of the aorta between the ascending and aortic arch in the proximal descending thoracic aorta.  The coarctation measured 14 mm in diameter; distal to the coarctation post dilation at 28 mm and proximally 29 mm.  The ascending aorta was dilated at 43 x 38 mm.  She was seen by [facility] Dr. Schneider.    She went to the emergency room a few days ago due to progressive lower extremity edema. They did do a chest x-ray that showed pleural effusion and told her to follow up with Cardiology.  They also had her increase her Lasix to 20 mg twice a day.  Last Friday she did also see Ortho who did an ultrasound of her legs that noted no DVT.  She denies any exertional chest pain.  Her albumin was quite low at 2.8 and has been trending down.  With regard to dietary intake, she says she only eats once a day at night.  She has been having significant nausea, she attributes to kidney stones and is followed by Urology for this.  On exam, her lungs are clear.  She does have slight decreased breath sounds in the right base.  She does have 1+ bilateral lower extremity edema, I/VI systolic murmur in the left sternal border.    Assessment and Plan:  1. Lower extremity edema.  It appears that this is partly secondary to decreased protein levels and her albumin was 2.8.  They did do a UA when she went to the emergency room and she was not protein spilling.  Based on her dietary intake, it does seem like some of this is due to oral intake.  I do think she also needs to see her GI doctor to make sure

## 2025-02-20 NOTE — TELEPHONE ENCOUNTER
Telephone call made to patient. Two patient identifiers verified.   Added patient to Dr. Valdes's schedule today. 2/20 at 11: 40

## 2025-02-20 NOTE — TELEPHONE ENCOUNTER
Patient called via the call service. A message was received stating that patient has been experiencing swelling in her feet since last week. She was admitted to the ED on Monday and diagnosed with fluid around her heart and lungs. States that the swelling has subsided but she is suffering from pain while walking and is unable to get shoes on. Patient stated that she is considering going back to the Emergency Department but wanted to talk with Dr. Valdes's team first.     Inas: [phone]

## 2025-03-05 DIAGNOSIS — I47.10 PAROXYSMAL SVT (SUPRAVENTRICULAR TACHYCARDIA): ICD-10-CM

## 2025-03-05 DIAGNOSIS — Q25.1 COARCTATION OF AORTA: ICD-10-CM

## 2025-03-05 DIAGNOSIS — I10 BENIGN ESSENTIAL HTN: ICD-10-CM

## 2025-03-05 DIAGNOSIS — E66.01 SEVERE OBESITY: ICD-10-CM

## 2025-03-05 RX ORDER — LOSARTAN POTASSIUM 50 MG/1
50 TABLET ORAL DAILY
Qty: 90 TABLET | Refills: 3 | Status: SHIPPED | OUTPATIENT
Start: 2025-03-05

## 2025-03-05 RX ORDER — METOPROLOL TARTRATE 25 MG/1
25 TABLET, FILM COATED ORAL 2 TIMES DAILY
Qty: 180 TABLET | Refills: 3 | Status: SHIPPED | OUTPATIENT
Start: 2025-03-05

## 2025-03-05 NOTE — TELEPHONE ENCOUNTER
Requested Prescriptions     Signed Prescriptions Disp Refills    metoprolol tartrate (LOPRESSOR) 25 MG tablet 180 tablet 3     Sig: Take 1 tablet by mouth 2 times daily     Authorizing Provider: YAMILETH VALDES     Ordering User: LUIS NIELSEN    losartan (COZAAR) 50 MG tablet 90 tablet 3     Sig: Take 1 tablet by mouth daily     Authorizing Provider: YAMILETH VALDES     Ordering User: LUIS NIELSEN     VO per MD    Future Appointments   Date Time Provider Department Center   3/10/2025  8:00 AM [facility] NUCLEAR 1 [facility] AM[facility]   3/10/2025 10:00 AM [facility]UC[facility] VASCULAR [facility]   8/25/2025  1:40 PM Yamileth [facility] MD [facility] AM[facility]

## 2025-03-07 ENCOUNTER — TELEPHONE (OUTPATIENT)
Age: 45
End: 2025-03-07

## 2025-03-07 NOTE — TELEPHONE ENCOUNTER
Telephone call made to patient. Two patient identifiers verified.   Went over stress test instructions. Verified understanding.

## 2025-03-07 NOTE — TELEPHONE ENCOUNTER
Patient requesting a call back to go over the prep for the test she has scheduled for Monday 3-.      Thanks

## 2025-03-15 ENCOUNTER — RESULTS FOLLOW-UP (OUTPATIENT)
Age: 45
End: 2025-03-15

## 2025-03-17 NOTE — TELEPHONE ENCOUNTER
Telephone call made to patient. Two patient identifiers verified.   Went over results with patient. Verified understanding. All questions answered.     The patient asked if she needed to take the lasix or not anymore since she doesn't have fluid around her heart. I asked if she had swelling, she said if she misses a dose she will get swelling. She was advised to continue taking it for the swelling.

## 2025-07-16 DIAGNOSIS — R60.0 BILATERAL LOWER EXTREMITY EDEMA: ICD-10-CM

## 2025-07-16 RX ORDER — FUROSEMIDE 40 MG/1
40 TABLET ORAL DAILY
Qty: 90 TABLET | Refills: 1 | Status: SHIPPED | OUTPATIENT
Start: 2025-07-16

## 2025-07-16 NOTE — TELEPHONE ENCOUNTER
Requested Prescriptions     Signed Prescriptions Disp Refills    furosemide (LASIX) 40 MG tablet 90 tablet 1     Sig: Take 1 tablet by mouth daily     Authorizing Provider: YAMILETH VALDES     Ordering User: LUIS NIELSEN MD    Future Appointments   Date Time Provider Department Center   9/5/2025  1:20 PM Yamileth aVldes MD [facility] AM[facility]

## 2025-07-21 RX ORDER — ALBUTEROL SULFATE 90 UG/1
INHALANT RESPIRATORY (INHALATION)
Qty: 8.5 G | Refills: 11 | Status: SHIPPED | OUTPATIENT
Start: 2025-07-21

## 2025-07-24 RX ORDER — DILTIAZEM HYDROCHLORIDE 120 MG/1
120 CAPSULE, COATED, EXTENDED RELEASE ORAL DAILY
Qty: 90 CAPSULE | Refills: 3 | Status: SHIPPED | OUTPATIENT
Start: 2025-07-24

## 2025-07-24 NOTE — TELEPHONE ENCOUNTER
Requested Prescriptions     Signed Prescriptions Disp Refills    dilTIAZem (CARDIZEM CD) 120 MG extended release capsule 90 capsule 3     Sig: Take 1 capsule by mouth daily     Authorizing Provider: YAMILETH VALDES     Ordering User: LUIS NIELSEN MD    Future Appointments   Date Time Provider Department Center   9/5/2025  1:20 PM Yamileth [facility] MD [facility] AM[facility]

## (undated) DEVICE — CABLE DIAG FOR ADVISOR HD GRID MAP CATH SENS ENABLED

## (undated) DEVICE — PACK PROCEDURE SURG HRT CATH

## (undated) DEVICE — Device

## (undated) DEVICE — VASCADE MVP

## (undated) DEVICE — CABLE FOR DIAGNOSTIC CATHETER: Brand: CABLE, SURELINK™

## (undated) DEVICE — BAG TRASH WST 122 CM 183 CM 1400 CC FEMALE LUER PVC DEPOT

## (undated) DEVICE — INTRODUCER SHTH 6FR L12CM DIA0.038IN HEMSTAS CLOSE TOL

## (undated) DEVICE — CATHETER MAP 8FR W13XH13MMXL105CM SPC 3MM TIP 1MM L ATRIUM

## (undated) DEVICE — CATHETER ABLAT 7FR L115CM 2-5-2MM SPC TIP L4MM BND 1MM QPLR

## (undated) DEVICE — CABLE EP L150CM GRY BPLR QPLR FOR 4FR QPLR CATH SUPREME

## (undated) DEVICE — KIT ELECTRD SURF FOR DISPLAYING THE 3D POS OF EP CATH

## (undated) DEVICE — CATH ABLATION ELCTROPHYSLGY 10MM SPAC 5FRX120CM

## (undated) DEVICE — HEMO INTRO 8.5F 60CM SR0 --

## (undated) DEVICE — VASCADE 6/7

## (undated) DEVICE — NEEDLE ANGIO 18GAX7CM SECURELOC

## (undated) DEVICE — INTRODUCER SHTH 5FR L12CM SNAP LOK OBT 11FR DBL DST J STR

## (undated) DEVICE — CATH EP 5F JSN  5MMX120CM -- SUPREME

## (undated) DEVICE — Device: Brand: PADPRO

## (undated) DEVICE — DRAPE PRB US TRNSDCR 6X96IN --

## (undated) DEVICE — CATH RMFG EP DCAPLR 6FR 125CM --

## (undated) DEVICE — REM POLYHESIVE ADULT PATIENT RETURN ELECTRODE: Brand: VALLEYLAB